# Patient Record
Sex: FEMALE | Race: BLACK OR AFRICAN AMERICAN | NOT HISPANIC OR LATINO | Employment: FULL TIME | URBAN - METROPOLITAN AREA
[De-identification: names, ages, dates, MRNs, and addresses within clinical notes are randomized per-mention and may not be internally consistent; named-entity substitution may affect disease eponyms.]

---

## 2017-03-30 ENCOUNTER — HOSPITAL ENCOUNTER (EMERGENCY)
Facility: CLINIC | Age: 32
Discharge: HOME | End: 2017-03-30
Attending: EMERGENCY MEDICINE | Admitting: EMERGENCY MEDICINE
Payer: COMMERCIAL

## 2017-03-30 VITALS
DIASTOLIC BLOOD PRESSURE: 87 MMHG | RESPIRATION RATE: 18 BRPM | TEMPERATURE: 97.6 F | BODY MASS INDEX: 32.46 KG/M2 | HEART RATE: 75 BPM | WEIGHT: 174.6 LBS | OXYGEN SATURATION: 98 % | SYSTOLIC BLOOD PRESSURE: 130 MMHG

## 2017-03-30 DIAGNOSIS — M54.16 LUMBAR RADICULOPATHY: ICD-10-CM

## 2017-03-30 PROCEDURE — 25000132 ZZH RX MED GY IP 250 OP 250 PS 637: Performed by: EMERGENCY MEDICINE

## 2017-03-30 PROCEDURE — 99284 EMERGENCY DEPT VISIT MOD MDM: CPT | Mod: Z6 | Performed by: EMERGENCY MEDICINE

## 2017-03-30 PROCEDURE — 99283 EMERGENCY DEPT VISIT LOW MDM: CPT | Performed by: EMERGENCY MEDICINE

## 2017-03-30 RX ORDER — IBUPROFEN 600 MG/1
600 TABLET, FILM COATED ORAL EVERY 6 HOURS PRN
Qty: 30 TABLET | Refills: 1 | Status: SHIPPED | OUTPATIENT
Start: 2017-03-30 | End: 2017-06-29

## 2017-03-30 RX ORDER — IBUPROFEN 600 MG/1
600 TABLET, FILM COATED ORAL ONCE
Status: COMPLETED | OUTPATIENT
Start: 2017-03-30 | End: 2017-03-30

## 2017-03-30 RX ORDER — OXYCODONE HYDROCHLORIDE 5 MG/1
5 TABLET ORAL ONCE
Status: COMPLETED | OUTPATIENT
Start: 2017-03-30 | End: 2017-03-30

## 2017-03-30 RX ORDER — METHYLPREDNISOLONE 4 MG
TABLET, DOSE PACK ORAL
Qty: 21 TABLET | Refills: 0 | Status: SHIPPED | OUTPATIENT
Start: 2017-03-30 | End: 2017-06-13

## 2017-03-30 RX ORDER — OXYCODONE HYDROCHLORIDE 5 MG/1
5 TABLET ORAL EVERY 6 HOURS PRN
Qty: 20 TABLET | Refills: 0 | Status: SHIPPED | OUTPATIENT
Start: 2017-03-30 | End: 2017-06-13

## 2017-03-30 RX ADMIN — OXYCODONE HYDROCHLORIDE 5 MG: 5 TABLET ORAL at 04:20

## 2017-03-30 RX ADMIN — IBUPROFEN 600 MG: 600 TABLET ORAL at 04:20

## 2017-03-30 ASSESSMENT — ENCOUNTER SYMPTOMS
FEVER: 0
ABDOMINAL PAIN: 0
SHORTNESS OF BREATH: 0
BACK PAIN: 1

## 2017-03-30 NOTE — ED AVS SNAPSHOT
[facility], Emergency Department    [address]    [place] [address]    Phone:  [phone]    Fax:  [phone]                                       Tonia Smith   MRN: [id number]    Department:  [facility], Emergency Department   Date of Visit:  3/30/2017           Patient Information     Date Of Birth          1985        Your diagnoses for this visit were:     Lumbar radiculopathy        You were seen by Jarek Mota MD.        Discharge Instructions       Please make an appointment to follow up with Your Primary Care Provider in 5-7 days         Discharge References/Attachments     BACK PAIN W/ SCIATICA (ENGLISH)      24 Hour Appointment Hotline       To make an appointment at any [place] clinic, call [phone] ([phone]). If you don't have a family doctor or clinic, we will help you find one. [place] clinics are conveniently located to serve the needs of you and your family.             Review of your medicines      START taking        Dose / Directions Last dose taken    methylPREDNISolone 4 MG tablet   Commonly known as:  MEDROL DOSEPAK   Quantity:  21 tablet        Follow package instructions   Refills:  0        oxyCODONE 5 MG IR tablet   Commonly known as:  ROXICODONE   Dose:  5 mg   Quantity:  20 tablet        Take 1 tablet (5 mg) by mouth every 6 hours as needed for pain   Refills:  0          CONTINUE these medicines which may have CHANGED, or have new prescriptions. If we are uncertain of the size of tablets/capsules you have at home, strength may be listed as something that might have changed.        Dose / Directions Last dose taken    ibuprofen 600 MG tablet   Commonly known as:  ADVIL/MOTRIN   Dose:  600 mg   What changed:    - medication strength  - how much to take   Quantity:  30 tablet        Take 1 tablet (600 mg) by mouth every 6 hours as needed for moderate pain   Refills:  1          Our records show that you are taking the medicines listed  below. If these are incorrect, please call your family doctor or clinic.        Dose / Directions Last dose taken    acetaminophen 500 MG tablet   Commonly known as:  TYLENOL   Dose:  500-1000 mg   Quantity:  100 tablet        Take 1-2 tablets (500-1,000 mg) by mouth every 6 hours as needed for mild pain   Refills:  2        ferrous sulfate 325 (65 FE) MG tablet   Commonly known as:  IRON   Dose:  325 mg   Quantity:  30 tablet        Take 1 tablet (325 mg) by mouth daily (with breakfast)   Refills:  2        glycerin-hypromellose- 0.2-0.2-1 % Soln ophthalmic solution   Commonly known as:  ARTIFICIAL TEARS   Dose:  1 drop   Quantity:  1 Bottle        Place 1 drop Into the left eye 4 times daily as needed for dry eyes   Refills:  3        labetalol 100 MG tablet   Commonly known as:  NORMODYNE   Dose:  100 mg   Quantity:  60 tablet        Take 1 tablet (100 mg) by mouth 2 times daily   Refills:  3        levonorgestrel 20 MCG/24HR IUD   Commonly known as:  MIRENA   Dose:  1 each        1 each (20 mcg) by Intrauterine route continuous   Refills:  0        meclizine 25 MG tablet   Commonly known as:  ANTIVERT   Dose:  25 mg   Quantity:  30 tablet        Take 1 tablet (25 mg) by mouth every 6 hours as needed for dizziness   Refills:  1        polyethylene glycol powder   Commonly known as:  MIRALAX   Dose:  1 capful   Quantity:  510 g        Take 17 g (1 capful) by mouth daily   Refills:  3        prenatal multivitamin  plus iron 27-0.8 MG Tabs per tablet   Dose:  1 tablet   Quantity:  100 tablet        Take 1 tablet by mouth daily   Refills:  3        senna-docusate 8.6-50 MG per tablet   Commonly known as:  SENOKOT-S;PERICOLACE   Dose:  1-2 tablet   Quantity:  40 tablet        Take 1-2 tablets by mouth 2 times daily   Refills:  0                Prescriptions were sent or printed at these locations (3 Prescriptions)                   Other Prescriptions                Printed at Department/Unit printer (3 of 3)  "        methylPREDNISolone (MEDROL DOSEPAK) 4 MG tablet               ibuprofen (ADVIL/MOTRIN) 600 MG tablet               oxyCODONE (ROXICODONE) 5 MG IR tablet                Orders Needing Specimen Collection     None      Pending Results     No orders found from 3/28/2017 to 3/31/2017.            Pending Culture Results     No orders found from 3/28/2017 to 3/31/2017.            Thank you for choosing [facility]       Thank you for choosing [facility] for your care. Our goal is always to provide you with excellent care. Hearing back from our patients is one way we can continue to improve our services. Please take a few minutes to complete the written survey that you may receive in the mail after you visit with us. Thank you!        [employer] Information     [email or web address] lets you send messages to your doctor, view your test results, renew your prescriptions, schedule appointments and more. To sign up, go to [email or web address] . Click on \"Log in\" on the left side of the screen, which will take you to the Welcome page. Then click on \"Sign up Now\" on the right side of the page.     You will be asked to enter the access code listed below, as well as some personal information. Please follow the directions to create your username and password.     Your access code is: [email or web address]  Expires: 2017  5:10 AM     Your access code will  in 90 days. If you need help or a new code, please call your [facility] clinic or [phone].        Care EveryWhere ID     This is your Care EveryWhere ID. This could be used by other organizations to access your [facility] medical records  [id number]        After Visit Summary       This is your record. Keep this with you and show to your community pharmacist(s) and doctor(s) at your next visit.                  "

## 2017-03-30 NOTE — ED AVS SNAPSHOT
[facility], [place], Emergency Department    [address]    [facility] [address]    Phone:  [phone]    Fax:  [phone]                                       Tonia Smith   MRN: [id number]    Department:  [facility], Emergency Department   Date of Visit:  3/30/2017           After Visit Summary Signature Page     I have received my discharge instructions, and my questions have been answered. I have discussed any challenges I see with this plan with the nurse or doctor.    ..........................................................................................................................................  Patient/Patient Representative Signature      ..........................................................................................................................................  Patient Representative Print Name and Relationship to Patient    ..................................................               ................................................  Date                                            Time    ..........................................................................................................................................  Reviewed by Signature/Title    ...................................................              ..............................................  Date                                                            Time

## 2017-03-30 NOTE — ED PROVIDER NOTES
History     Chief Complaint   Patient presents with     Back Pain     sharp low back pain x 3 days; radiates to bilateral feet     HPI  Tonia Smith is a 32 year old female who presents with back pain.  This started 3 days ago.  Denies any injury.  Pain is in her lower back and radiates down into her left leg.  Worse with movement.  No focal weakness.  He is ago but nothing since.  Has had low back pain in the past.  No history of surgeries.  No major trauma.    I have reviewed the Medications, Allergies, Past Medical and Surgical History, and Social History in the [facility] system.  Past Medical History:   Diagnosis Date     Hypertension      Preeclampsia        Past Surgical History:   Procedure Laterality Date      SECTION  10/5/2013    Procedure:  SECTION;;  Surgeon: Noemy Fowler MD;  Location: [facility] L[facility]      SECTION N/A 2016    Procedure:  SECTION;  Surgeon: Leah Patiño MD;  Location: [facility]     GYN SURGERY         Family History   Problem Relation Age of Onset     Family History Negative         Social History   Substance Use Topics     Smoking status: Never Smoker     Smokeless tobacco: Never Used     Alcohol use No      Review of Systems   Constitutional: Negative for fever.   Respiratory: Negative for shortness of breath.    Cardiovascular: Negative for chest pain.   Gastrointestinal: Negative for abdominal pain.   Musculoskeletal: Positive for back pain.   All other systems reviewed and are negative.      Physical Exam   BP: (!) 127/97  Pulse: 75  Heart Rate: 79  Temp: 98  F (36.7  C)  Resp: 16  Weight: 79.2 kg (174 lb 9.6 oz)  SpO2: 99 %  Physical Exam   Vital Signs and Nursing Notes Reviewed.  General:  NAD  HEENT: Oropharynx clear.  No obvious signs of trauma.  PERRL.  EOMI  Neck: Supple.  No lymphadenopathy.  Cardiac: RRR.  No murmurs, rubs or gallops  Lungs: Clear bilaterally.  Normal respiratory rate.    Abdomen:  Soft, Nontender, no rebound  or guarding.  Back: No CVA tenderness.  Skin:  No rash.  No diaphoresis  Extremities:  No cyanosis, clubbing, or edema.  Neurological:  CN II-XII intact, Strength intact, Sensation intact, No pronator drift, Normal gait.  Pulse:  Symmetric in bilateral upper and lower extremities.     ED Course     ED Course     Procedures             Critical Care time:  none               Labs Ordered and Resulted from Time of ED Arrival Up to the Time of Departure from the ED - No data to display    Assessments & Plan (with Medical Decision Making)  32 year old who presents with low back pain.  Seems likely lumbar radiculopathy.  Normal neurologic exam.  No indication for imaging.  Patient given oxycodone and ibuprofen with improvement of her symptoms.  We'll discharge her home with a Medrol Dosepak in addition to the above medications.  She will follow-up with her primary.  She will return for worsening symptoms.         I have reviewed the nursing notes.    I have reviewed the findings, diagnosis, plan and need for follow up with the patient.    Discharge Medication List as of 3/30/2017  5:10 AM      START taking these medications    Details   methylPREDNISolone (MEDROL DOSEPAK) 4 MG tablet Follow package instructions, Disp-21 tablet, R-0, Local Print      oxyCODONE (ROXICODONE) 5 MG IR tablet Take 1 tablet (5 mg) by mouth every 6 hours as needed for pain, Disp-20 tablet, R-0, Local Print             Final diagnoses:   Lumbar radiculopathy       3/30/2017   [facility], [place], EMERGENCY DEPARTMENT     Jarek Mota MD  03/30/17 0536

## 2017-04-05 ENCOUNTER — OFFICE VISIT (OUTPATIENT)
Dept: FAMILY MEDICINE | Facility: CLINIC | Age: 32
End: 2017-04-05

## 2017-04-05 VITALS
BODY MASS INDEX: 31.6 KG/M2 | RESPIRATION RATE: 20 BRPM | OXYGEN SATURATION: 98 % | DIASTOLIC BLOOD PRESSURE: 89 MMHG | HEART RATE: 86 BPM | SYSTOLIC BLOOD PRESSURE: 127 MMHG | WEIGHT: 170 LBS | TEMPERATURE: 97.9 F

## 2017-04-05 DIAGNOSIS — Z30.011 ENCOUNTER FOR INITIAL PRESCRIPTION OF CONTRACEPTIVE PILLS: ICD-10-CM

## 2017-04-05 DIAGNOSIS — Z30.432 ENCOUNTER FOR IUD REMOVAL: ICD-10-CM

## 2017-04-05 DIAGNOSIS — M54.16 ACUTE LUMBAR RADICULOPATHY: Primary | ICD-10-CM

## 2017-04-05 RX ORDER — NORETHINDRONE ACETATE 5 MG
5 TABLET ORAL DAILY
Qty: 84 TABLET | Refills: 3 | Status: SHIPPED | OUTPATIENT
Start: 2017-04-05 | End: 2018-03-09

## 2017-04-05 NOTE — MR AVS SNAPSHOT
After Visit Summary   4/5/2017    Tonia Smith    MRN: [id number]           Patient Information     Date Of Birth          1985        Visit Information        Provider Department      4/5/2017 10:40 AM Wanda Guerrero MD [facility]        Today's Diagnoses     Acute lumbar radiculopathy    -  1      Care Instructions    Here is the plan from today's visit    1. Acute lumbar radiculopathy  -Continue solumedrol, Oxycodone and ibuprofen prn for pain-control   - [facility] REFERRAL  -Follow up in one month if not improving or worsening       Please call or return to clinic if your symptoms don't go away.    Follow up plan  Follow up in one month     Thank you for coming to [facility] today.  Lab Testing:  **If you had lab testing today and your results are reassuring or normal they will be mailed to you or sent through [employer] within 7 days.   **If the lab tests need quick action we will call you with the results.  The phone number we will call with results is # [phone] (home) . If this is not the best number please call our clinic and change the number.  Medication Refills:  If you need any refills please call your pharmacy and they will contact us.   If you need to  your refill at a new pharmacy, please contact the new pharmacy directly. The new pharmacy will help you get your medications transferred faster.   Scheduling:  If you have any concerns about today's visit or wish to schedule another appointment please call our office during normal business hours [phone] (8-5:00 M-F)  If a referral was made to a [facility] and you don't get a call from central scheduling please call [phone].  If a Mammogram was ordered for you at [facility] call [phone] to schedule or change your appointment.  If you had an XRay/CT/Ultrasound/MRI ordered the number is [phone] to schedule or change your radiology appointment.    Medical Concerns:  If you have urgent medical concerns please call [phone] at any time of the day.  If you have a medical emergency please call [phone].        Follow-ups after your visit        Additional Services     [facility]       *This therapy referral will be filtered to a centralized scheduling office at [facility] and the patient will receive a call to schedule an appointment at a [facility] location most convenient for them. *     [facility] provides Physical Therapy evaluation and treatment and many specialty services across the [facility] system.  If requesting a specialty program, please choose from the list below.    If you have not heard from the scheduling office within 2 business days, please call [phone] for all locations, with the exception of [place], please call [phone].  Treatment: Evaluation & Treatment  Special Instructions/Modalities:   Special Programs: None    Please be aware that coverage of these services is subject to the terms and limitations of your health insurance plan.  Call member services at your health plan with any benefit or coverage questions.      **Note to Provider:  If you are referring outside of [facility] for the therapy appointment, please list the name of the location in the  special instructions  above, print the referral and give to the patient to schedule the appointment.                  Who to contact     Please call your clinic at [phone] to:    Ask questions about your health    Make or cancel appointments    Discuss your medicines    Learn about your test results    Speak to your doctor   If you have compliments or concerns about an experience at your clinic, or if you wish to file a complaint, please contact [facility] Patient Relations at [phone] or email us at [email or web address]         Additional Information About Your Visit        Taryn  Information     Cm[employer]enty is an electronic gateway that provides easy, online access to your medical records. With [employer]mxtwenty, you can request a clinic appointment, read your test results, renew a prescription or communicate with your care team.     To sign up for [employer] visit the website at [email or web address]   You will be asked to enter the access code listed below, as well as some personal information. Please follow the directions to create your username and password.     Your access code is: [email or web address]  Expires: 2017  5:10 AM     Your access code will  in 90 days. If you need help or a new code, please contact your [facility] or call [phone] for assistance.        Care EveryWhere ID     This is your Care EveryWhere ID. This could be used by other organizations to access your [facility] medical records  [id number]        Your Vitals Were     Pulse Temperature Respirations Pulse Oximetry Breastfeeding? BMI (Body Mass Index)    86 97.9  F (36.6  C) (Oral) 20 98% No 31.6 kg/m2       Blood Pressure from Last 3 Encounters:   17 127/89   17 130/87   16 122/85    Weight from Last 3 Encounters:   17 170 lb (77.1 kg)   17 174 lb 9.6 oz (79.2 kg)   16 171 lb 9.6 oz (77.8 kg)              We Performed the Following     [facility]        Primary Care Provider Office Phone # Fax #    Wanda Guerrero [phone]       [facility] [address] [facility] [address]        Thank you!     Thank you for choosing [facility]  for your care. Our goal is always to provide you with excellent care. Hearing back from our patients is one way we can continue to improve our services. Please take a few minutes to complete the written survey that you may receive in the mail after your visit with us. Thank you!             Your Updated Medication List - Protect others around you: Learn how to  safely use, store and throw away your medicines at www.disposemymeds.org.          This list is accurate as of: 4/5/17 11:30 AM.  Always use your most recent med list.                   Brand Name Dispense Instructions for use    acetaminophen 500 MG tablet    TYLENOL    100 tablet    Take 1-2 tablets (500-1,000 mg) by mouth every 6 hours as needed for mild pain       ferrous sulfate 325 (65 FE) MG tablet    IRON    30 tablet    Take 1 tablet (325 mg) by mouth daily (with breakfast)       glycerin-hypromellose- 0.2-0.2-1 % Soln ophthalmic solution    ARTIFICIAL TEARS    1 Bottle    Place 1 drop Into the left eye 4 times daily as needed for dry eyes       ibuprofen 600 MG tablet    ADVIL/MOTRIN    30 tablet    Take 1 tablet (600 mg) by mouth every 6 hours as needed for moderate pain       labetalol 100 MG tablet    NORMODYNE    60 tablet    Take 1 tablet (100 mg) by mouth 2 times daily       levonorgestrel 20 MCG/24HR IUD    MIRENA     1 each (20 mcg) by Intrauterine route continuous       meclizine 25 MG tablet    ANTIVERT    30 tablet    Take 1 tablet (25 mg) by mouth every 6 hours as needed for dizziness       methylPREDNISolone 4 MG tablet    MEDROL DOSEPAK    21 tablet    Follow package instructions       oxyCODONE 5 MG IR tablet    ROXICODONE    20 tablet    Take 1 tablet (5 mg) by mouth every 6 hours as needed for pain       polyethylene glycol powder    MIRALAX    510 g    Take 17 g (1 capful) by mouth daily       prenatal multivitamin  plus iron 27-0.8 MG Tabs per tablet     100 tablet    Take 1 tablet by mouth daily       senna-docusate 8.6-50 MG per tablet    SENOKOT-S;PERICOLACE    40 tablet    Take 1-2 tablets by mouth 2 times daily

## 2017-04-05 NOTE — PATIENT INSTRUCTIONS
Here is the plan from today's visit    1. Acute lumbar radiculopathy  -Continue solumedrol, Oxycodone and ibuprofen prn for pain-control   - [facility] RE[facility]ERR[facility]  -Follow up in one month if not improving or worsening       Please call or return to clinic if your symptoms don't go away.    Follow up plan  Follow up in one month     Thank you for coming to [facility] today.  Lab Testing:  **If you had lab testing today and your results are reassuring or normal they will be mailed to you or sent through [employer] within 7 days.   **If the lab tests need quick action we will call you with the results.  The phone number we will call with results is # [phone] (home) . If this is not the best number please call our clinic and change the number.  Medication Refills:  If you need any refills please call your pharmacy and they will contact us.   If you need to  your refill at a new pharmacy, please contact the new pharmacy directly. The new pharmacy will help you get your medications transferred faster.   Scheduling:  If you have any concerns about today's visit or wish to schedule another appointment please call our office during normal business hours [phone] (8-5:00 M-F)  If a referral was made to a [facility] and you don't get a call from central scheduling please call [phone].  If a Mammogram was ordered for you at [facility] call [phone] to schedule or change your appointment.  If you had an XRay/CT/Ultrasound/MRI ordered the number is [phone] to schedule or change your radiology appointment.   Medical Concerns:  If you have urgent medical concerns please call [phone] at any time of the day.  If you have a medical emergency please call [phone].

## 2017-04-05 NOTE — PROGRESS NOTES
HPI:       oTnia Smith is a 32 year old who presents for the following  Patient presents with:  IUD: had IUD inserted on 10/11/16, would liek it removed due to it causing back pain   Back Pain: low back pain for 2 weeks     Back pain: Report she has been dealing with acute back pain for the past two weeks.Describe the pain as sharp pain in the lower back and radiates into the left leg. Denies any bowel or bladder incontinences. She feels back pain associated with IUD. The Mirena was placed on Oct 11, 2016.     Problem, Medication and Allergy Lists were reviewed and are current.  Patient is an established patient of this clinic.         Review of Systems:   Review of Systems   Constitutional: Negative for fatigue and fever.   Respiratory: Negative for cough and shortness of breath.    Cardiovascular: Negative for chest pain, palpitations and leg swelling.   Musculoskeletal: Positive for back pain.        Leg pain             Physical Exam:   Patient Vitals for the past 24 hrs:   BP Temp Temp src Pulse Resp SpO2 Weight   04/05/17 1055 127/89 97.9  F (36.6  C) Oral 86 20 98 % 170 lb (77.1 kg)     Body mass index is 31.6 kg/(m^2).  Vitals were reviewed and were normal     Physical Exam   Constitutional: She is oriented to person, place, and time. She appears well-developed and well-nourished. She appears distressed.   HENT:   Head: Normocephalic and atraumatic.   Cardiovascular: Normal rate, regular rhythm and normal heart sounds.    No murmur heard.  Pulmonary/Chest: Effort normal and breath sounds normal. No respiratory distress. She has no wheezes. She has no rales.   Genitourinary:       Musculoskeletal:   LUMBAR SPINE:  Inspection: No diischarge, erythema and Lumbar prominence asymmetry  Tender:  lumbar spinous processes, left para lumbar muscles, right para lumbar muscles  Non-tender: left SI joint, right SI joint  Range of Motion: decrease range of motion due to pain   Strength:  unable to heel  walk  Special tests:  negative straight leg raises       Neurological: She is alert and oriented to person, place, and time.         Results:   SUBJECTIVE: Tonia Smith is a 32 year old  female, requests IUD removal     Verification of Procedure:  Just before the procedure begans through verbal and active participation of team members, I verified:     Initials   Patient Name HM   Patient  1985   Procedure to be performed NA     Consent:  Risks, benefits of treatment, and alternative options for contraception were discussed.  Patient's questions were elicited and answered.  Written consent was obtained and scanned into medical record.       OBJECTIVE: /89 (BP Location: Left arm, Patient Position: Chair, Cuff Size: Adult Regular)  Pulse 86  Temp 97.9  F (36.6  C) (Oral)  Resp 20  Wt 170 lb (77.1 kg)  SpO2 98%  Breastfeeding? No  BMI 31.6 kg/m2    Pelvic Exam:  EG/BUS: Normal genital architecture without lesions, erythema or abnormal secretions. Bartholin's, Urethra, Milbridge's glands are normal.   Vagina: moist, pink, rugae with creamy, white and odorlesssecretions  Cervix: Multiparous, and IUD strings extend 3 cm from external os.  Uterus: anteverted,    Adnexa: Within normal limits and No masses, nodularity, tenderness  Rectum: anus normal      PROCEDURE NOTE - Mirena IUD removal    Reason for removal: complication of back pain     Cervix was visualized with a medium Graves speculum and prepped with. The strings were grasped with a uterine packing forceps and the IUD removed with gentle traction.  No resistance was encountered.  The Mirena IUD immediately regained resting shape and was without odor or discoloration.  There were no complications.  Tonia Smith reported no pain.  There was no bleeding.     EBL:  Minimal     Complications:  None      Tonia Smith tolerated procedure well.    PLAN:      Written information on IUD use reviewed and given.  Symptoms to report reviewed. To  report heavy bleeding, severe cramping, or abnormal vaginal discharge.  May take Ibuprofen 400-800 mg PO TID PRN or Naproxen 500 mg PO BID for cramping. Patient has been counseled to use backup birth control method for 1 week.   Tonia Smith  verbalized understanding of instructions.  Assessment and Plan     Tonia was seen today for iud and back pain.    Diagnoses and all orders for this visit:    Acute lumbar radiculopathy:  -Patient education and reassurance provided.  -Continue solumedrol, Oxycodone and ibuprofen prn for pain-control   - [facility]  -Follow up in one month if not improving or worsening   -     [facility]    Encounter for IUD removal: s/p IUD removal     Encounter for initial prescription of contraceptive pills  _Will start progestin only pill for contraceptive for now since she is currently breastfeeding her 4 month old infant  -     norethindrone (AYGESTIN) 5 MG tablet; Take 1 tablet (5 mg) by mouth daily      There are no discontinued medications.  Options for treatment and follow-up care were reviewed with the patient. Tonia Smith  engaged in the decision making process and verbalized understanding of the options discussed and agreed with the final plan.    Wanda Guerrero MD  PGY 3 [facility] Medicine  [phone](pg)

## 2017-04-05 NOTE — PROGRESS NOTES
Preceptor Attestation:   Patient seen and discussed with the resident. Assessment and plan reviewed with resident and agreed upon.   Supervising Physician:  [facility]

## 2017-04-13 ASSESSMENT — ENCOUNTER SYMPTOMS
COUGH: 0
SHORTNESS OF BREATH: 0
FEVER: 0
BACK PAIN: 1
PALPITATIONS: 0
FATIGUE: 0

## 2017-04-19 ENCOUNTER — THERAPY VISIT (OUTPATIENT)
Dept: PHYSICAL THERAPY | Facility: CLINIC | Age: 32
End: 2017-04-19
Payer: COMMERCIAL

## 2017-04-19 DIAGNOSIS — M54.50 LUMBAGO: Primary | ICD-10-CM

## 2017-04-19 PROCEDURE — 97112 NEUROMUSCULAR REEDUCATION: CPT | Mod: GP | Performed by: PHYSICAL THERAPIST

## 2017-04-19 PROCEDURE — 97161 PT EVAL LOW COMPLEX 20 MIN: CPT | Mod: GP | Performed by: PHYSICAL THERAPIST

## 2017-04-19 PROCEDURE — T1013 SIGN LANG/ORAL INTERPRETER: HCPCS | Mod: U3

## 2017-04-19 NOTE — PROGRESS NOTES
[facility]  System  Physical Exam  General   ROS        Physical Therapy Initial Examination/Evaluation April 19, 2017   Tonia Smith is a 32 year old female referred to physical therapy by Dr. Wanda Guerrero for treatment of acute lumbar radiculopathy with Precautions/Restrictions/MD instructions E&T   Therapist Assessment:   Clinical Impression: Pt presents to [facility] with primary complaint of low back pain which has become constant in nature.  Per clinical examination, pt with left lateral shift in standing and limited lumbar ROM due to pain and hesitancy with movement.  Evaluation ongoing as pt arriving late to appointment and needing extra time to fill out paperwork.  Pt with positive slump test on the R.   Pt will benefit from skilled physical therapy to address functional deficits associated with worsening back pain.       Subjective: Pt reports that back pain has been present for ~1 month. There was no incident of injury. Initially pain was on both sides, now pain is mostly on the R.   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 Job duties: prolonged standing, repetitive tasks, lifting/carrying   Current HEP/exercise regimen: none   Patient's goals are improve walking mechanics, decreasing pain    Other pertinent PMH: none noted General health as reported by patient: Fair   Return to MD: prn      Lumbar Spine Evaluation  Static Posture  Foot: Pes planus/cavus:  "planus bilat     Lumbar Spine:   Posterior pelvic tilt  Pt leaning to the L    Dynamic Movement Screen  Single leg stance observations: hesitant to complete  Double limb squat observations: not tested  Gait:L lateral lean       Trunk Range of Motion  Flexion: 30% ROM with pt leaning to the L   Extension: minimal movement  Side bendin% ROM to the L; improved symptoms; 25% ROM to the R, increased pain   Rotation: 50% bilat; \"poking feeling when rotating to the R  Hamstring: hypertonic bilat      Hip and Knee Strength   Not tested this date due to time constraints      Special Tests  Neural tension: Reproduction of pain bilat with slump test; worse on the R    Assessment/Plan:      Patient is a 32 year old female with lumbar complaints.    Patient has the following significant findings with corresponding treatment plan.                Diagnosis 1:  Acute Lumbar Radiculopathy  Pain -  hot/cold therapy, manual therapy, education, directional preference exercise and home program  Decreased ROM/flexibility - manual therapy, therapeutic exercise and home program  Decreased strength - therapeutic exercise, therapeutic activities and home program  Impaired gait - gait training and home program  Impaired muscle performance - neuro re-education and home program  Impaired posture - neuro re-education, therapeutic activities and home program    Therapy Evaluation Codes:   1) History comprised of:   Personal factors that impact the plan of care:      Living environment and Past/current experiences.    Comorbidity factors that impact the plan of care are:      Cancer, Pain at night/rest and Weakness.     Medications impacting care: None.  2) Examination of Body Systems comprised of:   Body structures and functions that impact the plan of care:      Hip and Lumbar spine.   Activity limitations that impact the plan of care are:      Bathing, Dressing, Lifting, Sitting, Standing, Walking and Sleeping.  3) Clinical presentation " characteristics are:   Evolving/Changing.  4) Decision-Making    Low complexity using standardized patient assessment instrument and/or measureable assessment of functional outcome.  Cumulative Therapy Evaluation is: Low complexity.    Previous and current functional limitations:  (See Goal Flow Sheet for this information)    Short term and Long term goals: (See Goal Flow Sheet for this information)     Communication ability:  Patient appears to be able to clearly communicate and understand verbal and written communication and follow directions correctly.  Treatment Explanation - The following has been discussed with the patient:   RX ordered/plan of care  Anticipated outcomes  Possible risks and side effects  This patient would benefit from PT intervention to resume normal activities.   Rehab potential is good.    Frequency:  1 X week, once daily  Duration:  for 8 weeks  Discharge Plan:  Achieve all LTG.  Independent in home treatment program.  Reach maximal therapeutic benefit.    Please refer to the daily flowsheet for treatment today, total treatment time and time spent performing 1:1 timed codes.

## 2017-04-19 NOTE — MR AVS SNAPSHOT
After Visit Summary   4/19/2017    Tonia Smith    MRN: [id number]           Patient Information     Date Of Birth          1985        Visit Information        Provider Department      4/19/2017 12:10 PM Viola Garnett; Corry Manzanares, PT [facility] Physical [facility] [place]        Today's Diagnoses     Lumbago    -  1       Follow-ups after your visit        Your next 10 appointments already scheduled     Apr 24, 2017 11:15 AM CDT   FABIAN Spine with Corry Manzanares PT, [facility] [place] ([facility])    [address] [place] [id number] [phone]            May 03, 2017 11:00 AM CDT   FABIAN Spine with Corry Manzanares PT   [facility] [place] ( [facility])    [address] [place] [id number] [phone]            May 10, 2017 11:00 AM CDT   FABIAN Spine with Corry Manzanares PT   [facility] [place] ( [facility])    [address] [place] [id number] [phone]            May 17, 2017 11:00 AM CDT   FABIAN Spine with Corry Manzanares PT   [facility] [place] ( [facility])    [address] [place] [id number] [phone]              Who to contact     If you have questions or need follow up information about today's clinic visit or your schedule please contact [facility] directly at [phone].  Normal or non-critical lab and imaging results will be communicated to you by MyChart, letter or phone within 4 business days after the clinic has received the results. If you do not hear from us within 7 days, please contact the clinic through MyChart or phone. If you have a critical or abnormal lab result, we will notify you by phone as soon as  "possible.  Submit refill requests through [employer] or call your pharmacy and they will forward the refill request to us. Please allow 3 business days for your refill to be completed.          Additional Information About Your Visit        Roseonly Information     [employer]only lets you send messages to your doctor, view your test results, renew your prescriptions, schedule appointments and more. To sign up, go to [email or web address] . Click on \"Log in\" on the left side of the screen, which will take you to the Welcome page. Then click on \"Sign up Now\" on the right side of the page.     You will be asked to enter the access code listed below, as well as some personal information. Please follow the directions to create your username and password.     Your access code is: [email or web address]  Expires: 2017  5:10 AM     Your access code will  in 90 days. If you need help or a new code, please call your [facility] clinic or [phone].        Care EveryWhere ID     This is your Care EveryWhere ID. This could be used by other organizations to access your [facility] medical records  [id number]         Blood Pressure from Last 3 Encounters:   17 127/89   17 130/87   16 122/85    Weight from Last 3 Encounters:   17 77.1 kg (170 lb)   17 79.2 kg (174 lb 9.6 oz)   16 77.8 kg (171 lb 9.6 oz)              We Performed the Following     HC PT EVAL, LOW COMPLEXITY     FABIAN INITIAL EVAL REPORT     NEUROMUSCULAR RE-EDUCATION        Primary Care Provider Office Phone # Fax #    Wanda Guerrero [phone]       [facility] [address] [facility] [address]        Thank you!     Thank you for choosing [facility] [place]  for your care. Our goal is always to provide you with excellent care. Hearing back from our patients is one way we can continue to improve our services. Please take a few minutes to complete the written survey that you may " receive in the mail after your visit with us. Thank you!             Your Updated Medication List - Protect others around you: Learn how to safely use, store and throw away your medicines at www.disposemymeds.org.          This list is accurate as of: 4/19/17 11:59 PM.  Always use your most recent med list.                   Brand Name Dispense Instructions for use    acetaminophen 500 MG tablet    TYLENOL    100 tablet    Take 1-2 tablets (500-1,000 mg) by mouth every 6 hours as needed for mild pain       ferrous sulfate 325 (65 FE) MG tablet    IRON    30 tablet    Take 1 tablet (325 mg) by mouth daily (with breakfast)       glycerin-hypromellose- 0.2-0.2-1 % Soln ophthalmic solution    ARTIFICIAL TEARS    1 Bottle    Place 1 drop Into the left eye 4 times daily as needed for dry eyes       ibuprofen 600 MG tablet    ADVIL/MOTRIN    30 tablet    Take 1 tablet (600 mg) by mouth every 6 hours as needed for moderate pain       labetalol 100 MG tablet    NORMODYNE    60 tablet    Take 1 tablet (100 mg) by mouth 2 times daily       levonorgestrel 20 MCG/24HR IUD    MIRENA     1 each (20 mcg) by Intrauterine route continuous       meclizine 25 MG tablet    ANTIVERT    30 tablet    Take 1 tablet (25 mg) by mouth every 6 hours as needed for dizziness       methylPREDNISolone 4 MG tablet    MEDROL DOSEPAK    21 tablet    Follow package instructions       norethindrone 5 MG tablet    AYGESTIN    84 tablet    Take 1 tablet (5 mg) by mouth daily       oxyCODONE 5 MG IR tablet    ROXICODONE    20 tablet    Take 1 tablet (5 mg) by mouth every 6 hours as needed for pain       polyethylene glycol powder    MIRALAX    510 g    Take 17 g (1 capful) by mouth daily       prenatal multivitamin  plus iron 27-0.8 MG Tabs per tablet     100 tablet    Take 1 tablet by mouth daily       senna-docusate 8.6-50 MG per tablet    SENOKOT-S;PERICOLACE    40 tablet    Take 1-2 tablets by mouth 2 times daily

## 2017-04-20 PROBLEM — M54.50 LUMBAGO: Status: ACTIVE | Noted: 2017-04-20

## 2017-04-24 ENCOUNTER — THERAPY VISIT (OUTPATIENT)
Dept: PHYSICAL THERAPY | Facility: CLINIC | Age: 32
End: 2017-04-24
Payer: COMMERCIAL

## 2017-04-24 DIAGNOSIS — M54.50 LUMBAGO: ICD-10-CM

## 2017-04-24 PROCEDURE — 97112 NEUROMUSCULAR REEDUCATION: CPT | Mod: GP | Performed by: PHYSICAL THERAPIST

## 2017-04-24 PROCEDURE — 97140 MANUAL THERAPY 1/> REGIONS: CPT | Mod: GP | Performed by: PHYSICAL THERAPIST

## 2017-04-24 NOTE — PROGRESS NOTES
4/24/2017 MMT Left Right   Hip Flexion 2/5 limited by pain  2/5 limited by pain    Hip Abduction 2/5 pain  2+/5   Hip Extension  NT NT

## 2017-04-24 NOTE — MR AVS SNAPSHOT
After Visit Summary   4/24/2017    Tonia Smith    MRN: [id number]           Patient Information     Date Of Birth          1985        Visit Information        Provider Department      4/24/2017 11:15 AM Corry Manzanares PT; [facility]        Today's Diagnoses     Lumbago           Follow-ups after your visit        Your next 10 appointments already scheduled     May 03, 2017 11:00 AM CDT   FABIAN Spine with Corry aMnzanares PT   [facility] [place] ([facility])    [address] [place] [address] [phone]            May 10, 2017 11:00 AM CDT   FABIAN Spine with Corry Manzanares PT   [facility] [place] ([facility])    [address] [place] [phone]            May 17, 2017 11:00 AM CDT   FABIAN Spine with Corry Manzanares PT   [facility] ( [facility])    [address] [place] [phone]              Who to contact     If you have questions or need follow up information about today's clinic visit or your schedule please contact [facility] directly at [phone].  Normal or non-critical lab and imaging results will be communicated to you by MyChart, letter or phone within 4 business days after the clinic has received the results. If you do not hear from us within 7 days, please contact the clinic through MyChart or phone. If you have a critical or abnormal lab result, we will notify you by phone as soon as possible.  Submit refill requests through [employer] or call your pharmacy and they will forward the refill request to us. Please allow 3 business days for your refill to be completed.          Additional Information About Your Visit        MyChart Information      "[employer] lets you send messages to your doctor, view your test results, renew your prescriptions, schedule appointments and more. To sign up, go to [email or web address] . Click on \"Log in\" on the left side of the screen, which will take you to the Welcome page. Then click on \"Sign up Now\" on the right side of the page.     You will be asked to enter the access code listed below, as well as some personal information. Please follow the directions to create your username and password.     Your access code is: [email or web address]  Expires: 2017  5:10 AM     Your access code will  in 90 days. If you need help or a new code, please call your [place] clinic or [phone].        Care EveryWhere ID     This is your Care EveryWhere ID. This could be used by other organizations to access your [place] medical records  [id number]         Blood Pressure from Last 3 Encounters:   17 127/89   17 130/87   16 122/85    Weight from Last 3 Encounters:   17 77.1 kg (170 lb)   17 79.2 kg (174 lb 9.6 oz)   16 77.8 kg (171 lb 9.6 oz)              We Performed the Following     MANUAL THER TECH,1+REGIONS,EA 15 MIN     NEUROMUSCULAR RE-EDUCATION        Primary Care Provider Office Phone # Fax #    Wanda Guerrero [phone]       [facility] [address] [place] [id number]        Thank you!     Thank you for choosing [facility] [place]  for your care. Our goal is always to provide you with excellent care. Hearing back from our patients is one way we can continue to improve our services. Please take a few minutes to complete the written survey that you may receive in the mail after your visit with us. Thank you!             Your Updated Medication List - Protect others around you: Learn how to safely use, store and throw away your medicines at www.disposemymeds.org.          This list is accurate as of: 17 12:32 PM.  Always use " your most recent med list.                   Brand Name Dispense Instructions for use    acetaminophen 500 MG tablet    TYLENOL    100 tablet    Take 1-2 tablets (500-1,000 mg) by mouth every 6 hours as needed for mild pain       ferrous sulfate 325 (65 FE) MG tablet    IRON    30 tablet    Take 1 tablet (325 mg) by mouth daily (with breakfast)       glycerin-hypromellose- 0.2-0.2-1 % Soln ophthalmic solution    ARTIFICIAL TEARS    1 Bottle    Place 1 drop Into the left eye 4 times daily as needed for dry eyes       ibuprofen 600 MG tablet    ADVIL/MOTRIN    30 tablet    Take 1 tablet (600 mg) by mouth every 6 hours as needed for moderate pain       labetalol 100 MG tablet    NORMODYNE    60 tablet    Take 1 tablet (100 mg) by mouth 2 times daily       levonorgestrel 20 MCG/24HR IUD    MIRENA     1 each (20 mcg) by Intrauterine route continuous       meclizine 25 MG tablet    ANTIVERT    30 tablet    Take 1 tablet (25 mg) by mouth every 6 hours as needed for dizziness       methylPREDNISolone 4 MG tablet    MEDROL DOSEPAK    21 tablet    Follow package instructions       norethindrone 5 MG tablet    AYGESTIN    84 tablet    Take 1 tablet (5 mg) by mouth daily       oxyCODONE 5 MG IR tablet    ROXICODONE    20 tablet    Take 1 tablet (5 mg) by mouth every 6 hours as needed for pain       polyethylene glycol powder    MIRALAX    510 g    Take 17 g (1 capful) by mouth daily       prenatal multivitamin  plus iron 27-0.8 MG Tabs per tablet     100 tablet    Take 1 tablet by mouth daily       senna-docusate 8.6-50 MG per tablet    SENOKOT-S;PERICOLACE    40 tablet    Take 1-2 tablets by mouth 2 times daily

## 2017-05-11 ENCOUNTER — THERAPY VISIT (OUTPATIENT)
Dept: PHYSICAL THERAPY | Facility: CLINIC | Age: 32
End: 2017-05-11
Payer: COMMERCIAL

## 2017-05-11 DIAGNOSIS — M54.50 LUMBAGO: ICD-10-CM

## 2017-05-11 PROCEDURE — 97140 MANUAL THERAPY 1/> REGIONS: CPT | Mod: GP | Performed by: PHYSICAL THERAPIST

## 2017-05-11 PROCEDURE — 97112 NEUROMUSCULAR REEDUCATION: CPT | Mod: GP | Performed by: PHYSICAL THERAPIST

## 2017-05-11 PROCEDURE — 97110 THERAPEUTIC EXERCISES: CPT | Mod: GP | Performed by: PHYSICAL THERAPIST

## 2017-05-17 ENCOUNTER — THERAPY VISIT (OUTPATIENT)
Dept: PHYSICAL THERAPY | Facility: CLINIC | Age: 32
End: 2017-05-17
Payer: COMMERCIAL

## 2017-05-17 DIAGNOSIS — M54.50 LUMBAGO: ICD-10-CM

## 2017-05-17 PROCEDURE — 97110 THERAPEUTIC EXERCISES: CPT | Mod: GP | Performed by: PHYSICAL THERAPIST

## 2017-05-17 PROCEDURE — 97530 THERAPEUTIC ACTIVITIES: CPT | Mod: GP | Performed by: PHYSICAL THERAPIST

## 2017-05-17 PROCEDURE — 97112 NEUROMUSCULAR REEDUCATION: CPT | Mod: GP | Performed by: PHYSICAL THERAPIST

## 2017-06-12 ENCOUNTER — OFFICE VISIT (OUTPATIENT)
Dept: FAMILY MEDICINE | Facility: CLINIC | Age: 32
End: 2017-06-12

## 2017-06-12 VITALS
TEMPERATURE: 98.3 F | HEART RATE: 83 BPM | OXYGEN SATURATION: 96 % | RESPIRATION RATE: 16 BRPM | SYSTOLIC BLOOD PRESSURE: 125 MMHG | BODY MASS INDEX: 32.2 KG/M2 | WEIGHT: 173.2 LBS | DIASTOLIC BLOOD PRESSURE: 86 MMHG

## 2017-06-12 DIAGNOSIS — H69.93 DYSFUNCTION OF EUSTACHIAN TUBE, BILATERAL: ICD-10-CM

## 2017-06-12 DIAGNOSIS — H81.10 BPPV (BENIGN PAROXYSMAL POSITIONAL VERTIGO), UNSPECIFIED LATERALITY: Primary | ICD-10-CM

## 2017-06-12 NOTE — MR AVS SNAPSHOT
After Visit Summary   2017    Tonia Smith    MRN: [id number]           Patient Information     Date Of Birth          1985        Visit Information        Provider Department      2017 1:00 PM Fara Celaya APRN CNP [facility]        Today's Diagnoses     BPPV (benign paroxysmal positional vertigo), unspecified laterality    -  1    Dysfunction of eustachian tube, bilateral           Follow-ups after your visit        Who to contact     Please call your clinic at [phone] to:    Ask questions about your health    Make or cancel appointments    Discuss your medicines    Learn about your test results    Speak to your doctor   If you have compliments or concerns about an experience at your clinic, or if you wish to file a complaint, please contact [facility] Patient Relations at [phone] or email us at [email or web address]         Additional Information About Your Visit        MyChart Information     Volancet is an electronic gateway that provides easy, online access to your medical records. With Supersolid, you can request a clinic appointment, read your test results, renew a prescription or communicate with your care team.     To sign up for Volancet visit the website at [email or web address]   You will be asked to enter the access code listed below, as well as some personal information. Please follow the directions to create your username and password.     Your access code is: [email or web address]  Expires: 2017  5:10 AM     Your access code will  in 90 days. If you need help or a new code, please contact your [facility] or call [phone] for assistance.        Care EveryWhere ID     This is your Care EveryWhere ID. This could be used by other organizations to access your [facility] medical records  [id number]        Your Vitals Were     Pulse Temperature Respirations Pulse  Oximetry BMI (Body Mass Index)       83 98.3  F (36.8  C) (Oral) 16 96% 32.2 kg/m2        Blood Pressure from Last 3 Encounters:   06/12/17 125/86   04/05/17 127/89   03/30/17 130/87    Weight from Last 3 Encounters:   06/12/17 173 lb 3.2 oz (78.6 kg)   04/05/17 170 lb (77.1 kg)   03/30/17 174 lb 9.6 oz (79.2 kg)              Today, you had the following     No orders found for display         Today's Medication Changes          These changes are accurate as of: 6/12/17 11:59 PM.  If you have any questions, ask your nurse or doctor.               Start taking these medicines.        Dose/Directions    loratadine 10 MG tablet   Commonly known as:  CLARITIN   Used for:  Dysfunction of eustachian tube, bilateral   Started by:  Fara Celaya APRN CNP        Dose:  10 mg   Take 1 tablet (10 mg) by mouth daily   Quantity:  30 tablet   Refills:  6         These medicines have changed or have updated prescriptions.        Dose/Directions    meclizine 25 MG tablet   Commonly known as:  ANTIVERT   This may have changed:  when to take this   Used for:  BPPV (benign paroxysmal positional vertigo), unspecified laterality   Changed by:  Fara Celaya APRN CNP        Dose:  25 mg   Take 1 tablet (25 mg) by mouth 3 times daily as needed for dizziness   Quantity:  20 tablet   Refills:  0         Stop taking these medicines if you haven't already. Please contact your care team if you have questions.     methylPREDNISolone 4 MG tablet   Commonly known as:  MEDROL DOSEPAK   Stopped by:  Fara Celaya APRN CNP           oxyCODONE 5 MG IR tablet   Commonly known as:  ROXICODONE   Stopped by:  Fara Celaya APRN CNP                Where to get your medicines      Some of these will need a paper prescription and others can be bought over the counter.  Ask your nurse if you have questions.     You don't need a prescription for these medications     loratadine 10 MG tablet    meclizine 25 MG tablet                 Primary Care Provider Office Phone # Fax #    Wanda Guerrero [phone]       [facility] [address] [facility] [address]        Thank you!     Thank you for choosing [facility]  for your care. Our goal is always to provide you with excellent care. Hearing back from our patients is one way we can continue to improve our services. Please take a few minutes to complete the written survey that you may receive in the mail after your visit with us. Thank you!             Your Updated Medication List - Protect others around you: Learn how to safely use, store and throw away your medicines at www.disposemymeds.org.          This list is accurate as of: 6/12/17 11:59 PM.  Always use your most recent med list.                   Brand Name Dispense Instructions for use    acetaminophen 500 MG tablet    TYLENOL    100 tablet    Take 1-2 tablets (500-1,000 mg) by mouth every 6 hours as needed for mild pain       ferrous sulfate 325 (65 FE) MG tablet    IRON    30 tablet    Take 1 tablet (325 mg) by mouth daily (with breakfast)       glycerin-hypromellose- 0.2-0.2-1 % Soln ophthalmic solution    ARTIFICIAL TEARS    1 Bottle    Place 1 drop Into the left eye 4 times daily as needed for dry eyes       ibuprofen 600 MG tablet    ADVIL/MOTRIN    30 tablet    Take 1 tablet (600 mg) by mouth every 6 hours as needed for moderate pain       labetalol 100 MG tablet    NORMODYNE    60 tablet    Take 1 tablet (100 mg) by mouth 2 times daily       levonorgestrel 20 MCG/24HR IUD    MIRENA     1 each (20 mcg) by Intrauterine route continuous       loratadine 10 MG tablet    CLARITIN    30 tablet    Take 1 tablet (10 mg) by mouth daily       meclizine 25 MG tablet    ANTIVERT    20 tablet    Take 1 tablet (25 mg) by mouth 3 times daily as needed for dizziness       norethindrone 5 MG tablet    AYGESTIN    84 tablet    Take 1 tablet (5 mg) by mouth daily       polyethylene glycol  powder    MIRALAX    510 g    Take 17 g (1 capful) by mouth daily       prenatal multivitamin  plus iron 27-0.8 MG Tabs per tablet     100 tablet    Take 1 tablet by mouth daily       senna-docusate 8.6-50 MG per tablet    SENOKOT-S;PERICOLACE    40 tablet    Take 1-2 tablets by mouth 2 times daily

## 2017-06-13 LAB — HCG UR QL: NEGATIVE

## 2017-06-13 RX ORDER — LORATADINE 10 MG/1
10 TABLET ORAL DAILY
Qty: 30 TABLET | Refills: 6
Start: 2017-06-13 | End: 2018-03-02

## 2017-06-13 RX ORDER — MECLIZINE HYDROCHLORIDE 25 MG/1
25 TABLET ORAL 3 TIMES DAILY PRN
Qty: 20 TABLET | Refills: 0
Start: 2017-06-13 | End: 2018-03-02

## 2017-06-13 ASSESSMENT — ENCOUNTER SYMPTOMS
HEADACHES: 1
CARDIOVASCULAR NEGATIVE: 1
SORE THROAT: 0
RHINORRHEA: 0
FATIGUE: 1
CHILLS: 0
DIZZINESS: 1
COUGH: 1
FEVER: 0
SHORTNESS OF BREATH: 0
GASTROINTESTINAL NEGATIVE: 1
WHEEZING: 0

## 2017-06-13 NOTE — PROGRESS NOTES
HPI:       Tonia Smith is a 32 year old who presents for the following  Patient presents with:  Dizziness: having the spins last night but has felt like this for 9 months since giving birth back in November  Cough: dry cough x's 2 weeks    1.  Patient reports intermittent dizziness since having last baby 9 months ago.  Previously she was having short episodes (seconds) approximately 3 times per week. Had a more severe episode last evening where her room was spinning and this lasted through the night.  She reports that she had to hold her head and eyes due to being so uncomfortable.  No dizziness currently.  Does have a mild headache and heaviness feeling in her head.  Associated with symptoms of fatigue/weakness.  Denies shortness of breath, palpitations or diaphoresis.    Was previously seen in August 2016 and was given meclizine.  Patient states this medication was slightly helpful, but does not have any more of this at home.      She has not been sleeping well.  She states that she goes to sleep around 10-11 p.m. And is awake throughout much of the night. She states that she doesn't typically fall asleep until early morning.      LMP:  Unknown    Denies excessive worry/anxiety.      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  Her  works nights.      2.  +Ear congestion and non-productive cough (which is primarily present at night).  No known history of seasonal allergies.    Patient denies nasal congestion/sneezing.      An [employer]  was used for  this visit.      Problem, Medication and Allergy Lists were reviewed and are current.  Patient is an established patient of this clinic.         Review of Systems:   Review of Systems   Constitutional: Positive for fatigue. Negative for chills and fever.   HENT: Positive for congestion (bilateral ear congestion). Negative for ear discharge, ear pain, rhinorrhea, sneezing and sore throat.    Respiratory: Positive for cough. Negative for shortness of  breath and wheezing.    Cardiovascular: Negative.    Gastrointestinal: Negative.    Neurological: Positive for dizziness and headaches.             Physical Exam:   Patient Vitals for the past 24 hrs:   BP Temp Temp src Pulse Resp SpO2 Weight   06/12/17 1536 125/86 98.3  F (36.8  C) Oral 83 16 96 % 173 lb 3.2 oz (78.6 kg)     Body mass index is 32.2 kg/(m^2).  Vitals were reviewed and were normal     Physical Exam   Constitutional: She appears well-nourished. No distress.   HENT:   Right Ear: External ear and ear canal normal.   Left Ear: External ear and ear canal normal.   Mouth/Throat: Oropharynx is clear and moist.   Bilateral TM's with slight haziness, minimal fluid bubbles seen.     Eyes: Pupils are equal, round, and reactive to light.   Cardiovascular: Normal rate and regular rhythm.    Pulmonary/Chest: Effort normal and breath sounds normal. No respiratory distress. She has no wheezes.   Neurological: She is alert. She has normal strength. No cranial nerve deficit or sensory deficit.       Assessment and Plan       Tonia was seen today for dizziness and cough.    Diagnoses and all orders for this visit:    BPPV (benign paroxysmal positional vertigo), unspecified laterality  -     meclizine (ANTIVERT) 25 MG tablet; Take 1 tablet (25 mg) by mouth 3 times daily as needed for dizziness  -     UPT:  Negative.    Unable to review EPIC, recent labs as EPIC was down during patient's visit.   Encouraged patient to follow-up in 1 week for recheck, sooner as needed.    Consider checking TSH, CBC at next visit.      Dysfunction of eustachian tube, bilateral  -     loratadine (CLARITIN) 10 MG tablet; Take 1 tablet (10 mg) by mouth daily      Follow-up in 1 week, sooner with any worsening.      Options for treatment and follow-up care were reviewed with the patient. Tonia Smith  engaged in the decision making process and verbalized understanding of the options discussed and agreed with the final plan.    Fara Schilling  LYNN Celaya CNP

## 2017-06-28 ENCOUNTER — TELEPHONE (OUTPATIENT)
Dept: FAMILY MEDICINE | Facility: CLINIC | Age: 32
End: 2017-06-28

## 2017-06-28 NOTE — TELEPHONE ENCOUNTER
"Patient has no showed 2 scheduled appointments. Please use following script to explain no show policy to patient:    \"We see that you have missed some of your recently scheduled appointments. At [facility] we have a new no show policy, where if you miss too many appointments within 1 year, we may not be able to continue to schedule you. If you are unable to make your scheduled appointments, please call the clinic to cancel prior to your appointment time.\"  "

## 2017-06-29 ENCOUNTER — OFFICE VISIT (OUTPATIENT)
Dept: FAMILY MEDICINE | Facility: CLINIC | Age: 32
End: 2017-06-29

## 2017-06-29 VITALS
HEART RATE: 89 BPM | TEMPERATURE: 98.1 F | WEIGHT: 174 LBS | RESPIRATION RATE: 16 BRPM | OXYGEN SATURATION: 95 % | SYSTOLIC BLOOD PRESSURE: 116 MMHG | DIASTOLIC BLOOD PRESSURE: 83 MMHG | BODY MASS INDEX: 32.34 KG/M2

## 2017-06-29 DIAGNOSIS — H60.593 OTHER NONINFECTIVE ACUTE OTITIS EXTERNA OF BOTH EARS: ICD-10-CM

## 2017-06-29 DIAGNOSIS — G44.219 EPISODIC TENSION-TYPE HEADACHE, NOT INTRACTABLE: Primary | ICD-10-CM

## 2017-06-29 RX ORDER — ACETAMINOPHEN 500 MG
500-1000 TABLET ORAL EVERY 6 HOURS PRN
Qty: 100 TABLET | Refills: 2 | Status: SHIPPED | OUTPATIENT
Start: 2017-06-29 | End: 2018-05-02

## 2017-06-29 RX ORDER — IBUPROFEN 600 MG/1
600 TABLET, FILM COATED ORAL EVERY 6 HOURS PRN
Qty: 30 TABLET | Refills: 1 | Status: SHIPPED | OUTPATIENT
Start: 2017-06-29 | End: 2018-03-02

## 2017-06-29 RX ORDER — NEOMYCIN SULFATE, POLYMYXIN B SULFATE AND HYDROCORTISONE 10; 3.5; 1 MG/ML; MG/ML; [USP'U]/ML
4 SUSPENSION/ DROPS AURICULAR (OTIC) 4 TIMES DAILY
Qty: 6 ML | Refills: 0 | Status: SHIPPED | OUTPATIENT
Start: 2017-06-29 | End: 2017-07-06

## 2017-06-29 NOTE — MR AVS SNAPSHOT
"              After Visit Summary   6/29/2017    Tonia Smith    MRN: [id number]           Patient Information     Date Of Birth          1985        Visit Information        Provider Department      6/29/2017 2:20 PM Wanda Guerrero MD [facility]        Today's Diagnoses     Episodic tension-type headache, not intractable    -  1    Other noninfective acute otitis externa of both ears          Care Instructions      * Tension Headache    Muscle Tension Headache (also called \"stress headache\") is a very common cause of head pain. Under stress, some people tense the muscles of their shoulder, neck and scalp without knowing it. If this lasts long enough, a headache can occur. These headaches can be very painful and last for hours or even days.  Home Care:    If you were given pain medicine for this headache, do not drive yourself home. Arrange for a ride, instead. When you get home, try to sleep. You should feel much better when you wake up.    Heat to the back of your neck may relieve neck spasm.    Drink only clear liquids or eat a very light diet to avoid nausea/vomiting until symptoms improve.  Preventing Future Headaches    Identify the sources of stress in your life. These may not be obvious! Learn new ways to handle your stress, such as regular exercise, biofeedback, self-hypnosis and meditation. For more information about this, consult your doctor or go to a local bookstore and review the many books and tapes on this subject.    At the first sign of a tension headache, take time out if possible. Remove yourself from the stressful situation, find a quiet comfortable place to sit or lie down and let yourself relax. Heat and deep massage of the tight areas in the neck and shoulders may help reduce muscle spasm. Medicine, such as ibuprofen (Advil or Motrin) or a prescribed muscle relaxant may be helpful at this point.  Follow Up with your doctor if the headache is not better within the " next 24 hours. If you have frequent headaches you should discuss a treatment plan with your primary care doctor. Ask if you can have medicine to take at home the next time you get a bad headache. This may avoid the need for a visit to the emergency department in the future. Poorly controlled chronic headaches may require a referral to a neurologist (headache specialist).  Call your Doctor Or Get Prompt Medical Attention if any of the following occur:    Worsening of your head pain or no improvement within 24 hours    Repeated vomiting (unable to keep liquids down)    Fever of 100.4 F (38.0 C) or higher, or as directed by your healthcare provider    Stiff neck    Extreme drowsiness, confusion or fainting    Dizziness, vertigo (dizziness with spinning sensation)    Weakness of an arm or leg or one side of the face    Difficulty with speech or vision    1985-5389 [facility], [address] [place] [address]. All rights reserved. This information is not intended as a substitute for professional medical care. Always follow your healthcare professional's instructions.      Here is the plan from today's visit    1. Episodic tension-type headache, not intractable  - acetaminophen (TYLENOL) 500 MG tablet; Take 1-2 tablets (500-1,000 mg) by mouth every 6 hours as needed for mild pain  Dispense: 100 tablet; Refill: 2  - ibuprofen (ADVIL/MOTRIN) 600 MG tablet; Take 1 tablet (600 mg) by mouth every 6 hours as needed for moderate pain  Dispense: 30 tablet; Refill: 1    2. Other noninfective acute otitis externa of both ears  - neomycin-polymyxin-hydrocortisone (CORTISPORIN) 3.5-96220-7 otic suspension; Place 4 drops in ear(s) 4 times daily for 7 days  Dispense: 6 mL; Refill: 0    Please call or return to clinic if your symptoms don't go away.    Follow up plan  Follow up as needed    Thank you for coming to [facility] today.  Lab Testing:  **If you had lab testing today and your results are reassuring or normal  they will be mailed to you or sent through [employer] within 7 days.   **If the lab tests need quick action we will call you with the results.  The phone number we will call with results is # [phone] (home) . If this is not the best number please call our clinic and change the number.  Medication Refills:  If you need any refills please call your pharmacy and they will contact us.   If you need to  your refill at a new pharmacy, please contact the new pharmacy directly. The new pharmacy will help you get your medications transferred faster.   Scheduling:  If you have any concerns about today's visit or wish to schedule another appointment please call our office during normal business hours [phone] (8-5:00 M-F)  If a referral was made to a [facility] and you don't get a call from central scheduling please call [phone].  If a Mammogram was ordered for you at [facility] call [phone] to schedule or change your appointment.  If you had an XRay/CT/Ultrasound/MRI ordered the number is [phone] to schedule or change your radiology appointment.   Medical Concerns:  If you have urgent medical concerns please call [phone] at any time of the day.  If you have a medical emergency please call 251.          Follow-ups after your visit        Who to contact     Please call your clinic at [phone] to:    Ask questions about your health    Make or cancel appointments    Discuss your medicines    Learn about your test results    Speak to your doctor   If you have compliments or concerns about an experience at your clinic, or if you wish to file a complaint, please contact [facility] Patient Relations at [phone] or email us at [email or web address]         Additional Information About Your Visit        [employer] Information     [employer] is an electronic gateway that provides easy, online access to your medical records. With  [employer], you can request a clinic appointment, read your test results, renew a prescription or communicate with your care team.     To sign up for [employer] visit the website at [email or web address]   You will be asked to enter the access code listed below, as well as some personal information. Please follow the directions to create your username and password.     Your access code is: [email or web address]  Expires: 2017  2:52 PM     Your access code will  in 90 days. If you need help or a new code, please contact your [facility] or call [phone] for assistance.        Care EveryWhere ID     This is your Care EveryWhere ID. This could be used by other organizations to access your [facility] medical records  [id number]        Your Vitals Were     Pulse Temperature Respirations Pulse Oximetry BMI (Body Mass Index)       89 98.1  F (36.7  C) (Oral) 16 95% 32.34 kg/m2        Blood Pressure from Last 3 Encounters:   17 116/83   17 125/86   17 127/89    Weight from Last 3 Encounters:   17 174 lb (78.9 kg)   17 173 lb 3.2 oz (78.6 kg)   17 170 lb (77.1 kg)              Today, you had the following     No orders found for display         Today's Medication Changes          These changes are accurate as of: 17  2:52 PM.  If you have any questions, ask your nurse or doctor.               Start taking these medicines.        Dose/Directions    neomycin-polymyxin-hydrocortisone 3.5-58098-0 otic suspension   Commonly known as:  CORTISPORIN   Used for:  Other noninfective acute otitis externa of both ears   Started by:  Wanda Guerrero MD        Dose:  4 drop   Place 4 drops in ear(s) 4 times daily for 7 days   Quantity:  6 mL   Refills:  0            Where to get your medicines      These medications were sent to [facility] [place], [place] - [address] , [facility] [address]     Phone:  [phone]      acetaminophen 500 MG tablet    ibuprofen 600 MG tablet    neomycin-polymyxin-hydrocortisone 3.5-89614-4 otic suspension                Primary Care Provider Office Phone # Fax #    Wanda Guerrero [phone]       [facility] [address] [facility] [address]        Equal Access to Services     ESPERANZA PEÑA : Hadii aad ku hadasho Soomaali, waaxda luqadaha, qaybta kaalmada adelennieyada, britney matos husseinjeremiah samloveheath clifford. So [facility] [phone].    ATENCIÓN: Si habla español, tiene a gordillo disposición servicios gratuitos de asistencia lingüística. [facility] [phone].    We comply with applicable federal civil rights laws and [place] laws. We do not discriminate on the basis of race, color, national origin, age, disability sex, sexual orientation or gender identity.            Thank you!     Thank you for choosing [facility]  for your care. Our goal is always to provide you with excellent care. Hearing back from our patients is one way we can continue to improve our services. Please take a few minutes to complete the written survey that you may receive in the mail after your visit with us. Thank you!             Your Updated Medication List - Protect others around you: Learn how to safely use, store and throw away your medicines at www.disposemymeds.org.          This list is accurate as of: 6/29/17  2:52 PM.  Always use your most recent med list.                   Brand Name Dispense Instructions for use Diagnosis    acetaminophen 500 MG tablet    TYLENOL    100 tablet    Take 1-2 tablets (500-1,000 mg) by mouth every 6 hours as needed for mild pain    Episodic tension-type headache, not intractable       ibuprofen 600 MG tablet    ADVIL/MOTRIN    30 tablet    Take 1 tablet (600 mg) by mouth every 6 hours as needed for moderate pain    Episodic tension-type headache, not intractable       loratadine 10 MG tablet    CLARITIN    30 tablet    Take 1 tablet (10 mg) by  mouth daily    Dysfunction of eustachian tube, bilateral       meclizine 25 MG tablet    ANTIVERT    20 tablet    Take 1 tablet (25 mg) by mouth 3 times daily as needed for dizziness    BPPV (benign paroxysmal positional vertigo), unspecified laterality       neomycin-polymyxin-hydrocortisone 3.5-29278-6 otic suspension    CORTISPORIN    6 mL    Place 4 drops in ear(s) 4 times daily for 7 days    Other noninfective acute otitis externa of both ears       norethindrone 5 MG tablet    AYGESTIN    84 tablet    Take 1 tablet (5 mg) by mouth daily    Encounter for initial prescription of contraceptive pills

## 2017-06-29 NOTE — PATIENT INSTRUCTIONS
"  * Tension Headache    Muscle Tension Headache (also called \"stress headache\") is a very common cause of head pain. Under stress, some people tense the muscles of their shoulder, neck and scalp without knowing it. If this lasts long enough, a headache can occur. These headaches can be very painful and last for hours or even days.  Home Care:    If you were given pain medicine for this headache, do not drive yourself home. Arrange for a ride, instead. When you get home, try to sleep. You should feel much better when you wake up.    Heat to the back of your neck may relieve neck spasm.    Drink only clear liquids or eat a very light diet to avoid nausea/vomiting until symptoms improve.  Preventing Future Headaches    Identify the sources of stress in your life. These may not be obvious! Learn new ways to handle your stress, such as regular exercise, biofeedback, self-hypnosis and meditation. For more information about this, consult your doctor or go to a local bookstore and review the many books and tapes on this subject.    At the first sign of a tension headache, take time out if possible. Remove yourself from the stressful situation, find a quiet comfortable place to sit or lie down and let yourself relax. Heat and deep massage of the tight areas in the neck and shoulders may help reduce muscle spasm. Medicine, such as ibuprofen (Advil or Motrin) or a prescribed muscle relaxant may be helpful at this point.  Follow Up with your doctor if the headache is not better within the next 24 hours. If you have frequent headaches you should discuss a treatment plan with your primary care doctor. Ask if you can have medicine to take at home the next time you get a bad headache. This may avoid the need for a visit to the emergency department in the future. Poorly controlled chronic headaches may require a referral to a neurologist (headache specialist).  Call your Doctor Or Get Prompt Medical Attention if any of the following " occur:    Worsening of your head pain or no improvement within 24 hours    Repeated vomiting (unable to keep liquids down)    Fever of 100.4 F (38.0 C) or higher, or as directed by your healthcare provider    Stiff neck    Extreme drowsiness, confusion or fainting    Dizziness, vertigo (dizziness with spinning sensation)    Weakness of an arm or leg or one side of the face    Difficulty with speech or vision    4855-8869 [employer] [address], [place] [address]. All rights reserved. This information is not intended as a substitute for professional medical care. Always follow your healthcare professional's instructions.      Here is the plan from today's visit    1. Episodic tension-type headache, not intractable  - acetaminophen (TYLENOL) 500 MG tablet; Take 1-2 tablets (500-1,000 mg) by mouth every 6 hours as needed for mild pain  Dispense: 100 tablet; Refill: 2  - ibuprofen (ADVIL/MOTRIN) 600 MG tablet; Take 1 tablet (600 mg) by mouth every 6 hours as needed for moderate pain  Dispense: 30 tablet; Refill: 1    2. Other noninfective acute otitis externa of both ears  - neomycin-polymyxin-hydrocortisone (CORTISPORIN) 3.5-33029-4 otic suspension; Place 4 drops in ear(s) 4 times daily for 7 days  Dispense: 6 mL; Refill: 0    Please call or return to clinic if your symptoms don't go away.    Follow up plan  Follow up as needed    Thank you for coming to [facility] today.  Lab Testing:  **If you had lab testing today and your results are reassuring or normal they will be mailed to you or sent through Pentaho within 7 days.   **If the lab tests need quick action we will call you with the results.  The phone number we will call with results is # [phone] (home) . If this is not the best number please call our clinic and change the number.  Medication Refills:  If you need any refills please call your pharmacy and they will contact us.   If you need to  your refill at a new pharmacy,  please contact the new pharmacy directly. The new pharmacy will help you get your medications transferred faster.   Scheduling:  If you have any concerns about today's visit or wish to schedule another appointment please call our office during normal business hours [phone] (8-5:00 M-F)  If a referral was made to a [facility] and you don't get a call from central scheduling please call [phone].  If a Mammogram was ordered for you at [facility] call [phone] to schedule or change your appointment.  If you had an XRay/CT/Ultrasound/MRI ordered the number is [phone] to schedule or change your radiology appointment.   Medical Concerns:  If you have urgent medical concerns please call [phone] at any time of the day.  If you have a medical emergency please call 522.

## 2017-06-29 NOTE — NURSING NOTE
name: Yuli Libradoid  Language: Oromo  Agency: [facility]  Phone number: [phone]  Lisy Carbone

## 2017-06-29 NOTE — PROGRESS NOTES
Preceptor Attestation:  I discussed the patient with the resident. Assessment and plan reviewed with resident and agreed upon.   Supervising Physician:  Casey Guerra MD  [facility]

## 2017-06-29 NOTE — PROGRESS NOTES
HPI:       Tonia Smith is a 32 year old who presents for the following  Patient presents with:  RECHECK: f/u on bilateral ear pain, has improved so has the dizziness. Itchiness in both ears occosionally w/ neck itchiness at night  Headache: started yesterday and worse in the a.m. Frontal headache    Headache     Onset: yesterday     Description:   Location: bilateral in the frontal area   Character: squeezing pain  Frequency:  Daily  Duration:  hours    Intensity: 7/10    Progression of Symptoms:  worsening    Accompanying Signs & Symptoms:  Stiff neck: no  Neck or upper back pain: no  Fever: no  Sinus pressure: no  Nausea or vomitingno  Dizziness: Yes Details: improving   Numbness::no  Weakness (in one part of the body?  :no  Visual changes: no   History:   Head trauma: no  Family history of migraines: no  Previous tests for headaches: no  Neurologist evaluations: no  Able to do daily activities: Yes Details: she is able to take care her kids  Wake with a headaches: Yes Details: sometimes  Do headaches wake you up:no  Daily pain medication use:  no  New life stressors  :no    Precipitating factors:   Does light make it worse: Yes Details: sometimes  Does sound make it worse: no    Alleviating factors:  Does sleep help: YES         Therapies Tried and outcome: None       Problem, Medication and Allergy Lists were reviewed and are current.  Patient is an established patient of this clinic.         Review of Systems:   Review of Systems   Constitutional: Negative for fatigue and fever.   HENT: Positive for ear pain. Negative for ear discharge and sinus pressure.    Respiratory: Negative for cough and shortness of breath.    Cardiovascular: Negative for chest pain, palpitations and leg swelling.   Musculoskeletal: Negative for neck pain and neck stiffness.   Neurological: Positive for dizziness and headaches. Negative for speech difficulty and light-headedness.   Psychiatric/Behavioral: Negative for dysphoric  mood and sleep disturbance.             Physical Exam:   Patient Vitals for the past 24 hrs:   BP Temp Temp src Pulse Resp SpO2 Weight   06/29/17 1428 116/83 98.1  F (36.7  C) Oral 89 16 95 % 174 lb (78.9 kg)     Body mass index is 32.34 kg/(m^2).  Vitals were reviewed and were normal     Physical Exam   Constitutional: She is oriented to person, place, and time. She appears well-developed and well-nourished. No distress.   HENT:   Head: Normocephalic and atraumatic.   Right Ear: Hearing and tympanic membrane normal. There is swelling and tenderness.   Left Ear: Hearing and ear canal normal. There is swelling and tenderness.   Neck: Normal range of motion and full passive range of motion without pain. Neck supple. No spinous process tenderness and no muscular tenderness present. No rigidity. No Brudzinski's sign and no Kernig's sign noted.   Cardiovascular: Normal rate, regular rhythm and normal heart sounds.    No murmur heard.  Pulmonary/Chest: Effort normal and breath sounds normal. No respiratory distress. She has no wheezes. She has no rales.   Musculoskeletal: Normal range of motion. She exhibits no edema.   Neurological: She is alert and oriented to person, place, and time.       Results:      Results from the last 24 hoursNo results found for this or any previous visit (from the past 24 hour(s)).  Assessment and Plan     Tonia was seen today for recheck and headache.    Diagnoses and all orders for this visit:    Episodic tension-type headache, not intractable:  No red flags. Benign neuro exam. Headache relieved by sleeping and tylenol.   -Patient education and reassurance provided.   -Encouraged to do stress reduction exercise, heat and deep oil massage of the tight areas in the neck and shoulder.   -Consider amitriptyline if no improvement with tylenol or ibuprofen.   -     acetaminophen (TYLENOL) 500 MG tablet; Take 1-2 tablets (500-1,000 mg) by mouth every 6 hours as needed for mild pain  -     ibuprofen  (ADVIL/MOTRIN) 600 MG tablet; Take 1 tablet (600 mg) by mouth every 6 hours as needed for moderate pain    Other noninfective acute otitis externa of both ears  -     neomycin-polymyxin-hydrocortisone (CORTISPORIN) 3.5-28212-1 otic suspension; Place 4 drops in ear(s) 4 times daily for 7 days      Medications Discontinued During This Encounter   Medication Reason     labetalol (NORMODYNE) 100 MG tablet Stopped by Patient     polyethylene glycol (MIRALAX) powder Stopped by Patient     senna-docusate (SENOKOT-S;PERICOLACE) 8.6-50 MG per tablet Stopped by Patient     Prenatal Vit-Fe Fumarate-FA (PRENATAL MULTIVITAMIN  PLUS IRON) 27-0.8 MG TABS Stopped by Patient     levonorgestrel (MIRENA) 20 MCG/24HR IUD Stopped by Patient     glycerin-hypromellose- (ARTIFICIAL TEARS) 0.2-0.2-1 % SOLN ophthalmic solution Stopped by Patient     ferrous sulfate (IRON) 325 (65 FE) MG tablet Stopped by Patient     acetaminophen (TYLENOL) 500 MG tablet Reorder     ibuprofen (ADVIL/MOTRIN) 600 MG tablet Reorder     Options for treatment and follow-up care were reviewed with the patient. Tonia Smith  engaged in the decision making process and verbalized understanding of the options discussed and agreed with the final plan.    Wanda Guerrero MD  [facility]  [phone](pg)

## 2017-06-30 ASSESSMENT — ENCOUNTER SYMPTOMS
FATIGUE: 0
SLEEP DISTURBANCE: 0
COUGH: 0
PALPITATIONS: 0
HEADACHES: 1
NECK PAIN: 0
DIZZINESS: 1
FEVER: 0
SHORTNESS OF BREATH: 0
DYSPHORIC MOOD: 0
NECK STIFFNESS: 0
SINUS PRESSURE: 0
SPEECH DIFFICULTY: 0
LIGHT-HEADEDNESS: 0

## 2017-06-30 ASSESSMENT — PATIENT HEALTH QUESTIONNAIRE - PHQ9: SUM OF ALL RESPONSES TO PHQ QUESTIONS 1-9: 2

## 2017-10-11 ENCOUNTER — OFFICE VISIT (OUTPATIENT)
Dept: FAMILY MEDICINE | Facility: CLINIC | Age: 32
End: 2017-10-11

## 2017-10-11 VITALS
TEMPERATURE: 98.4 F | BODY MASS INDEX: 31.23 KG/M2 | HEART RATE: 94 BPM | WEIGHT: 168 LBS | RESPIRATION RATE: 18 BRPM | DIASTOLIC BLOOD PRESSURE: 88 MMHG | OXYGEN SATURATION: 99 % | SYSTOLIC BLOOD PRESSURE: 138 MMHG

## 2017-10-11 DIAGNOSIS — Z00.00 ROUTINE GENERAL MEDICAL EXAMINATION AT A HEALTH CARE FACILITY: Primary | ICD-10-CM

## 2017-10-11 NOTE — PROGRESS NOTES
HPI:       Tonia Smith is a 32 year old who presents for the following  Patient presents with:  Forms: citizenship forms    She is presenting for a doctors letter for department of homeland security. She failed her previous citizenship test and is now undergoing waiver for test. Form was completed by her psychologist and now is presenting for a letter stating her PCP does not understand her psychiatric problems enough to fill out the forms. Therefore she had it completed from her psychologist.     She otherwise denies any acute issues.     [facility]  was used for  this visit.      Problem, Medication and Allergy Lists were reviewed and are current.  Patient is an established patient of this clinic.         Review of Systems:   Review of Systems   Review Of Systems  Skin: negative except as above  Respiratory: negative except as above  Cardiovascular: negative except as above  Gastrointestinal: negative except as above  Genitourinary: negative except as above  Musculoskeletal: negative except as above  Neurologic: negative except as above  Psychiatric: negative except as above  Hematologic/Lymphatic/Immunologic: negative except as above  Endocrine: negative except as above       Physical Exam:   Patient Vitals for the past 24 hrs:   BP Temp Temp src Pulse Resp SpO2 Weight   10/11/17 1322 138/88 - - - - - -   10/11/17 1320 (!) 137/96 98.4  F (36.9  C) Oral 94 18 99 % 168 lb (76.2 kg)     Body mass index is 31.23 kg/(m^2).  Vitals were reviewed and were normal     Physical Exam   Constitutional: She is oriented to person, place, and time. appears well-developed and well-nourished.   HENT:   Head: Normocephalic and atraumatic.   Eyes: Conjunctivae are normal.   Cardiovascular: Normal peripheral perfusion.    Pulmonary/Chest: Effort normal. No respiratory distress.  Musculoskeletal: Normal range of motion.   Neurological: He is alert and oriented to person, place, and time.   Skin: Skin is warm and  dry.   Psychiatric: He has a normal mood and affect. His behavior is normal.       Assessment and Plan     Patient Instructions   Here is the plan from today's visit    1. Routine general medical examination at a health care facility  She is presenting for a doctors letter for department of homeland security. She failed her previous citizenship test and is now undergoing waiver for test. Form was completed by her psychologist and now is presenting for a letter stating her PCP does not understand her psychiatric problems enough to fill out the forms. Therefore she had it completed from her psychologist.   -Letter printed and given to her  -Deferred flu vaccine today      Please call or return to clinic if your symptoms don't go away.    Follow up plan  Please make a clinic appointment for follow up with your primary physician Baylee Garrido as needed.    Thank you for coming to [facility] today.    There are no discontinued medications.  Options for treatment and follow-up care were reviewed with the patient. Tonia Smith  engaged in the decision making process and verbalized understanding of the options discussed and agreed with the final plan.    Geri Richey MD

## 2017-10-11 NOTE — PROGRESS NOTES
Preceptor Attestation:   Patient seen and discussed with the resident. Assessment and plan reviewed with resident and agreed upon.   Supervising Physician:  Fausto Luna MD  [facility]

## 2017-10-11 NOTE — LETTER
October 11, 2017      Tonia Smith  [address]  [place] [address]        To whom it may concern,    After reviewing and reading the letter written by Gaye Yu, I have decided to explain my reasoning to not complete the [place]-[id number] form myself. Due to Tonia's extensive psychiatric illnesses and past, it is more reasonable for her psychologist to complete the form as this pertains to her cognitive ability. She has been seen here for other medical problems and this issue has not been addressed as this is a psychiatric etiology and have deferred to the specialists. We highly recommend having a specialized person who can understand the true cognitive ability so that the most accurate interpretation may be offered to the [employer] of [employer]. As of now, will differ to psychology care provider for this.       If you have any concerns please call and leave a message for me in the clinic.    Sincerely,    Geri Richey MD

## 2017-10-11 NOTE — MR AVS SNAPSHOT
After Visit Summary   10/11/2017    Tonia Smith    MRN: [id number]           Patient Information     Date Of Birth          1985        Visit Information        Provider Department      10/11/2017 1:00 PM Geri Richey MD [facility]        Today's Diagnoses     Routine general medical examination at a health care facility    -  1      Care Instructions    Here is the plan from today's visit    1. Routine general medical examination at a health care facility  She is presenting for a doctors letter for department of homeland security. She failed her previous citizenship test and is now undergoing waiver for test. Form was completed by her psychologist and now is presenting for a letter stating her PCP does not understand her psychiatric problems enough to fill out the forms. Therefore she had it completed from her psychologist.   -Letter printed and given to her  -Deferred flu vaccine today      Please call or return to clinic if your symptoms don't go away.    Follow up plan  Please make a clinic appointment for follow up with your primary physician Baylee Garrido as needed.    Thank you for coming to [facility] today.  Lab Testing:  **If you had lab testing today and your results are reassuring or normal they will be mailed to you or sent through Advaxis within 7 days.   **If the lab tests need quick action we will call you with the results.  The phone number we will call with results is # [phone] (home) . If this is not the best number please call our clinic and change the number.  Medication Refills:  If you need any refills please call your pharmacy and they will contact us.   If you need to  your refill at a new pharmacy, please contact the new pharmacy directly. The new pharmacy will help you get your medications transferred faster.   Scheduling:  If you have any concerns about today's visit or wish to schedule another appointment please call our  office during normal business hours [phone] (8-5:00 M-F)  If a referral was made to a [facility] and you don't get a call from central scheduling please call [phone].  If a Mammogram was ordered for you at [facility] call [phone] to schedule or change your appointment.  If you had an XRay/CT/Ultrasound/MRI ordered the number is [phone] to schedule or change your radiology appointment.   Medical Concerns:  If you have urgent medical concerns please call [phone] at any time of the day.            Follow-ups after your visit        Follow-up notes from your care team     Return if symptoms worsen or fail to improve.      Who to contact     Please call your clinic at [phone] to:    Ask questions about your health    Make or cancel appointments    Discuss your medicines    Learn about your test results    Speak to your doctor   If you have compliments or concerns about an experience at your clinic, or if you wish to file a complaint, please contact [facility] Patient Relations at [phone] or email us at [email or web address]         Additional Information About Your Visit        Xageekhart Information     VaporWiret is an electronic gateway that provides easy, online access to your medical records. With [employer]ire, you can request a clinic appointment, read your test results, renew a prescription or communicate with your care team.     To sign up for VaporWiret visit the website at [email or web address]   You will be asked to enter the access code listed below, as well as some personal information. Please follow the directions to create your username and password.     Your access code is: [email or web address]  Expires: 2018  2:25 PM     Your access code will  in 90 days. If you need help or a new code, please contact your [facility] or call [phone] for assistance.        Care EveryWhere ID      This is your Care EveryWhere ID. This could be used by other organizations to access your [place] medical records  [id number]        Your Vitals Were     Pulse Temperature Respirations Pulse Oximetry BMI (Body Mass Index)       94 98.4  F (36.9  C) (Oral) 18 99% 31.23 kg/m2        Blood Pressure from Last 3 Encounters:   10/11/17 138/88   06/29/17 116/83   06/12/17 125/86    Weight from Last 3 Encounters:   10/11/17 168 lb (76.2 kg)   06/29/17 174 lb (78.9 kg)   06/12/17 173 lb 3.2 oz (78.6 kg)              Today, you had the following     No orders found for display       Primary Care Provider Office Phone # Fax #    Baylee Garrido [phone]       [facility] [address] [place] [address]        Equal Access to Services     ESPERANZA PEÑA : Hadii aad ku hadasho Sojessica, waaxda luqadaha, qaybta kaalmada adeegyada, britney simons . So [facility] [phone].    ATENCIÓN: Si habla español, tiene a gordillo disposición servicios gratuitos de asistencia lingüística. Carissa al [phone].    We comply with applicable federal civil rights laws and [place] laws. We do not discriminate on the basis of race, color, national origin, age, disability, sex, sexual orientation, or gender identity.            Thank you!     Thank you for choosing [facility]  for your care. Our goal is always to provide you with excellent care. Hearing back from our patients is one way we can continue to improve our services. Please take a few minutes to complete the written survey that you may receive in the mail after your visit with us. Thank you!             Your Updated Medication List - Protect others around you: Learn how to safely use, store and throw away your medicines at www.disposemymeds.org.          This list is accurate as of: 10/11/17  2:25 PM.  Always use your most recent med list.                   Brand Name Dispense Instructions for  use Diagnosis    acetaminophen 500 MG tablet    TYLENOL    100 tablet    Take 1-2 tablets (500-1,000 mg) by mouth every 6 hours as needed for mild pain    Episodic tension-type headache, not intractable       ibuprofen 600 MG tablet    ADVIL/MOTRIN    30 tablet    Take 1 tablet (600 mg) by mouth every 6 hours as needed for moderate pain    Episodic tension-type headache, not intractable       loratadine 10 MG tablet    CLARITIN    30 tablet    Take 1 tablet (10 mg) by mouth daily    Dysfunction of Eustachian tube, bilateral       meclizine 25 MG tablet    ANTIVERT    20 tablet    Take 1 tablet (25 mg) by mouth 3 times daily as needed for dizziness    BPPV (benign paroxysmal positional vertigo), unspecified laterality       norethindrone 5 MG tablet    AYGESTIN    84 tablet    Take 1 tablet (5 mg) by mouth daily    Encounter for initial prescription of contraceptive pills

## 2017-10-11 NOTE — PATIENT INSTRUCTIONS
Here is the plan from today's visit    1. Routine general medical examination at a health care facility  She is presenting for a doctors letter for department of homeland security. She failed her previous citizenship test and is now undergoing waiver for test. Form was completed by her psychologist and now is presenting for a letter stating her PCP does not understand her psychiatric problems enough to fill out the forms. Therefore she had it completed from her psychologist.   -Letter printed and given to her  -Deferred flu vaccine today      Please call or return to clinic if your symptoms don't go away.    Follow up plan  Please make a clinic appointment for follow up with your primary physician Baylee Garirdo as needed.    Thank you for coming to [facility] today.  Lab Testing:  **If you had lab testing today and your results are reassuring or normal they will be mailed to you or sent through SNOBSWAP within 7 days.   **If the lab tests need quick action we will call you with the results.  The phone number we will call with results is # [phone] ([place]) . If this is not the best number please call our clinic and change the number.  Medication Refills:  If you need any refills please call your pharmacy and they will contact us.   If you need to  your refill at a new pharmacy, please contact the new pharmacy directly. The new pharmacy will help you get your medications transferred faster.   Scheduling:  If you have any concerns about today's visit or wish to schedule another appointment please call our office during normal business hours [phone] (8-5:00 M-F)  If a referral was made to a [facility] and you don't get a call from central scheduling please call [phone].  If a Mammogram was ordered for you at [facility] call [phone] to schedule or change your appointment.  If you had an XRay/CT/Ultrasound/MRI ordered the number is [phone] to schedule or  change your radiology appointment.   Medical Concerns:  If you have urgent medical concerns please call [phone] at any time of the day.

## 2017-10-11 NOTE — LETTER
October 11, 2017      Tonia Smith  [address] [facility]  [facility] [address]        To whom it may concern,    After reviewing and reading the letter written by Gaye Yu, I have decided to explain my reasoning to not complete the N-648 form myself. Due to Tonia's extensive psychiatric illnesses and past, it is more reasonable for her psychologist or psychiatrist to complete the form as this pertains to her cognitive ability. She has been seen here for other medical problems and this issue has not been addressed as this is a psychiatric etiology and have deferred to the specialists. We highly recommend having a specialized person who can understand the true cognitive ability so that the most accurate interpretation may be offered to the [employer]. As of now, will differ to psychiatric care provider for this.       If you have any concerns please call and leave a message for me in the clinic.    Sincerely,    Geri Richey MD

## 2018-02-28 ENCOUNTER — OFFICE VISIT (OUTPATIENT)
Dept: FAMILY MEDICINE | Facility: CLINIC | Age: 33
End: 2018-02-28
Payer: MEDICAID

## 2018-02-28 VITALS
DIASTOLIC BLOOD PRESSURE: 90 MMHG | HEART RATE: 90 BPM | OXYGEN SATURATION: 100 % | SYSTOLIC BLOOD PRESSURE: 135 MMHG | TEMPERATURE: 98.3 F | RESPIRATION RATE: 16 BRPM | BODY MASS INDEX: 30.41 KG/M2 | WEIGHT: 163.6 LBS

## 2018-02-28 DIAGNOSIS — M62.81 GENERALIZED MUSCLE WEAKNESS: ICD-10-CM

## 2018-02-28 DIAGNOSIS — M54.50 ACUTE LEFT-SIDED LOW BACK PAIN WITHOUT SCIATICA: Primary | ICD-10-CM

## 2018-02-28 RX ORDER — NAPROXEN 500 MG/1
500 TABLET ORAL 2 TIMES DAILY PRN
Qty: 30 TABLET | Refills: 0 | Status: SHIPPED | OUTPATIENT
Start: 2018-02-28 | End: 2018-03-02 | Stop reason: SINTOL

## 2018-02-28 NOTE — MR AVS SNAPSHOT
After Visit Summary   2/28/2018    Tonia Smith    MRN: [id number]           Patient Information     Date Of Birth          1985        Visit Information        Provider Department      2/28/2018 1:20 PM Baylee Garrido MD [facility]        Today's Diagnoses     Acute left-sided low back pain, with sciatica presence unspecified    -  1      Care Instructions    Make an appointment with Dr. Candelaria on Friday at 2pm for OMT          Follow-ups after your visit        Additional Services     [facility], PT, HAND AND CHIROPRACTIC REFERRAL - [facility]       **This order will print in the [facility] Scheduling Office**    Physical Therapy, Hand Therapy and Chiropractic Care are available through:    *[facility]  *[facility]  *[facility]    Call one number to schedule at any of the above locations: [phone].    Your provider has referred you to: Physical Therapy at [facility] or [facility]    Indication/Reason for Referral: Low Back Pain  Onset of Illness: 5 days-acute  Therapy Orders: Evaluate and Treat  Special Programs: Acupuncture if patient is willing  Special Request: Jacqueline Botello      Additional Comments for the Therapist or Chiropractor:     Please be aware that coverage of these services is subject to the terms and limitations of your health insurance plan.  Call member services at your health plan with any benefit or coverage questions.      Please bring the following to your appointment:    *Your personal calendar for scheduling future appointments  *Comfortable clothing                  Who to contact     Please call your clinic at [phone] to:    Ask questions about your health    Make or cancel appointments    Discuss your medicines    Learn about your test results    Speak to your doctor            Additional Information About Your Visit        [employer] Information     [employer] is an electronic gateway that provides easy, online  access to your medical records. With [employer], you can request a clinic appointment, read your test results, renew a prescription or communicate with your care team.     To sign up for [employer] visit the website at [email or web address]   You will be asked to enter the access code listed below, as well as some personal information. Please follow the directions to create your username and password.     Your access code is: [email or web address]  Expires: 2018  1:56 PM     Your access code will  in 90 days. If you need help or a new code, please contact your [facility] or call [phone] for assistance.        Care EveryWhere ID     This is your Care EveryWhere ID. This could be used by other organizations to access your [place] medical records  [id number]        Your Vitals Were     Pulse Temperature Respirations Pulse Oximetry BMI (Body Mass Index)       90 98.3  F (36.8  C) (Oral) 16 100% 30.41 kg/m2        Blood Pressure from Last 3 Encounters:   18 135/90   10/11/17 138/88   17 116/83    Weight from Last 3 Encounters:   18 163 lb 9.6 oz (74.2 kg)   10/11/17 168 lb (76.2 kg)   17 174 lb (78.9 kg)              We Performed the Following     [facility], PT, HAND AND CHIROPRACTIC REFERRAL - [facility]          Today's Medication Changes          These changes are accurate as of 18  1:56 PM.  If you have any questions, ask your nurse or doctor.               Start taking these medicines.        Dose/Directions    diclofenac 1 % Gel topical gel   Commonly known as:  VOLTAREN   Used for:  Acute left-sided low back pain, with sciatica presence unspecified   Started by:  Baylee Garrido MD        Apply 4 grams to knees or 2 grams to hands four times daily using enclosed dosing card.   Quantity:  100 g   Refills:  1       naproxen 500 MG tablet   Commonly known as:  NAPROSYN   Used for:  Acute left-sided low back pain, with sciatica presence unspecified   Started  by:  Baylee Garrido MD        Dose:  500 mg   Take 1 tablet (500 mg) by mouth 2 times daily as needed for moderate pain   Quantity:  30 tablet   Refills:  0            Where to get your medicines      These medications were sent to [facility] [place], [place] - [address], [facility] [address]     Phone:  [phone]     diclofenac 1 % Gel topical gel    naproxen 500 MG tablet                Primary Care Provider Office Phone # Fax #    Baylee Garrido [phone]       [facility] [address]   [facility] [address]        Equal Access to Services     [facility] : Hadii nakia brewster hadjacko Callum, waaxda lujohnathanadaha, qaybta kaalmada adelennieyada, britney simons . So [facility] [phone].    ATENCIÓN: Si habla español, tiene a gordillo disposición servicios gratuitos de asistencia lingüística. [facility] [phone].    We comply with applicable federal civil rights laws and [place] laws. We do not discriminate on the basis of race, color, national origin, age, disability, sex, sexual orientation, or gender identity.            Thank you!     Thank you for choosing [facility]  for your care. Our goal is always to provide you with excellent care. Hearing back from our patients is one way we can continue to improve our services. Please take a few minutes to complete the written survey that you may receive in the mail after your visit with us. Thank you!             Your Updated Medication List - Protect others around you: Learn how to safely use, store and throw away your medicines at www.disposemymeds.org.          This list is accurate as of 2/28/18  1:56 PM.  Always use your most recent med list.                   Brand Name Dispense Instructions for use Diagnosis    acetaminophen 500 MG tablet    TYLENOL    100 tablet    Take 1-2 tablets (500-1,000 mg) by mouth every 6 hours as needed for mild pain     Episodic tension-type headache, not intractable       diclofenac 1 % Gel topical gel    VOLTAREN    100 g    Apply 4 grams to knees or 2 grams to hands four times daily using enclosed dosing card.    Acute left-sided low back pain, with sciatica presence unspecified       ibuprofen 600 MG tablet    ADVIL/MOTRIN    30 tablet    Take 1 tablet (600 mg) by mouth every 6 hours as needed for moderate pain    Episodic tension-type headache, not intractable       loratadine 10 MG tablet    CLARITIN    30 tablet    Take 1 tablet (10 mg) by mouth daily    Dysfunction of Eustachian tube, bilateral       meclizine 25 MG tablet    ANTIVERT    20 tablet    Take 1 tablet (25 mg) by mouth 3 times daily as needed for dizziness    BPPV (benign paroxysmal positional vertigo), unspecified laterality       naproxen 500 MG tablet    NAPROSYN    30 tablet    Take 1 tablet (500 mg) by mouth 2 times daily as needed for moderate pain    Acute left-sided low back pain, with sciatica presence unspecified       norethindrone 5 MG tablet    AYGESTIN    84 tablet    Take 1 tablet (5 mg) by mouth daily    Encounter for initial prescription of contraceptive pills

## 2018-02-28 NOTE — PROGRESS NOTES
"      HPI:       Tonia Smith is a 33 year old who presents for the following  Patient presents with:  Back Pain: x's 5 days of low left side back pain. Feels stiff. L. arm and leg pain. Sitting and standing make it worse. No injury that the pt remembers. Hx of the pain. Done PT w/ some relief.  Headache: left side of head as well.     Reports left lower back pain x 5 days. Reports similar pain about the same time of year, 1 year ago. Worked with PT and experienced an improvement in pain.  She also reports headache yesterday evening, for which she took her \"headache medication,\" which provided relief for both headache and back pain. The medication she took is Aleve OTC.  She also reports associated left sided body muscle weakness. Reports palpable muscle knots in left leg. She denies any inciting incidents or injuries. She has 2 young children at home, 5yo and 1.4yo. Does not carry the infant on one side of her body more than the other. She also reports difficulties with gait due to weakness and pain. Has difficulty lifing her left leg. Also report a shooting pain in her left lower back, \"like twisting in my back.\" Denies fevers. No sick contacts. No sensory symptoms. No urinary or bowel symptoms.     An [employer]  was used for  this visit.      Problem, Medication and Allergy Lists were reviewed and are current.  Patient is an established patient of this clinic.         Review of Systems:   Review of Systems   Constitutional: Negative for chills, fatigue and fever.   HENT: Negative for congestion and rhinorrhea.    Eyes: Negative for visual disturbance.   Respiratory: Negative for cough and shortness of breath.    Cardiovascular: Negative for chest pain.   Gastrointestinal: Negative for nausea and vomiting.   Genitourinary: Negative for decreased urine volume and difficulty urinating.   Musculoskeletal: Positive for back pain, gait problem and myalgias.   Skin: Negative for rash.   Neurological: Positive " for weakness (left side of body) and headaches. Negative for syncope, speech difficulty, light-headedness and numbness.   Psychiatric/Behavioral: Negative for confusion.             Physical Exam:     No data found.    Body mass index is 30.41 kg/(m^2).  Vital signs normal except BMI     Physical Exam   Constitutional: She is oriented to person, place, and time.   HENT:   Head: Normocephalic and atraumatic.   Eyes: Pupils are equal, round, and reactive to light.   Cardiovascular: Normal rate.    Pulmonary/Chest: Effort normal.   Musculoskeletal:        Lumbar back: She exhibits decreased range of motion.        Back:    Decreased flexion and extension of lumbar spine.   Limited rotation of lumbar spine.  No bony spinal tenderness.   No significant abnormal curvature to spine    Neurological: She is alert and oriented to person, place, and time. No cranial nerve deficit.   Reflex Scores:       Patellar reflexes are 2+ on the right side and 2+ on the left side.   strength of RUE>LUE  RLE strength>LLE strength (more significant difference between R and L in lower extremity than upper extremity).  Normal vibratory sensation between LLE and RLE.  Patient has trouble transferring from sitting to standing position due to back pain.    Psychiatric:   Slightly flat affect  Poor eye contact       Results:     No results this visit.   Assessment and Plan     1. Acute left-sided low back pain without sciatica  2. Generalized muscle weakness  It appears that etiology is likely muscular weakness. No indication for emergent imaging at this time. Discussed management options with patient and agreement was made to pursue a conservative approach with oral naproxen, topical diclofenac, and re-establishing care with PT to work on muscle strengthening. Also discussed labs to rule out myopathy vs PMR and proximal muscle weakness in hypothyroidism. Also discussed OMT available at [facility] by a DO provider and patient is  interested in this. She is scheduled to see Dr. Candelaria for OMT on Friday 3/2. Will plan for labs to be collected at that time: TSH, ESR and CK.   - diclofenac (VOLTAREN) 1 % GEL topical gel; Apply 4 grams to knees or 2 grams to hands four times daily using enclosed dosing card.  Dispense: 100 g; Refill: 1  - [facility], PT, HAND AND CHIROPRACTIC REFERRAL - [facility]  - Erythrocyte sedimentation rate auto; Future  - TSH with free T4 reflex; Future  - CK total; Future    There are no discontinued medications.  Options for treatment and follow-up care were reviewed with the patient. Tonia Smith  engaged in the decision making process and verbalized understanding of the options discussed and agreed with the final plan.    Katiuska Garrido MD  Family Medicine PGY2

## 2018-02-28 NOTE — PROGRESS NOTES
Preceptor Attestation:   Patient seen and discussed with the resident. Assessment and plan reviewed with resident and agreed upon.  Supervising Physician:  Fausto Luna MD  [facility]

## 2018-03-01 ENCOUNTER — HOSPITAL ENCOUNTER (EMERGENCY)
Facility: CLINIC | Age: 33
Discharge: HOME | End: 2018-03-01
Attending: EMERGENCY MEDICINE | Admitting: EMERGENCY MEDICINE
Payer: MEDICAID

## 2018-03-01 ENCOUNTER — APPOINTMENT (OUTPATIENT)
Dept: CT IMAGING | Facility: CLINIC | Age: 33
End: 2018-03-01
Payer: MEDICAID

## 2018-03-01 VITALS
HEART RATE: 95 BPM | SYSTOLIC BLOOD PRESSURE: 126 MMHG | WEIGHT: 168 LBS | TEMPERATURE: 97.4 F | BODY MASS INDEX: 31.23 KG/M2 | DIASTOLIC BLOOD PRESSURE: 88 MMHG | RESPIRATION RATE: 16 BRPM | OXYGEN SATURATION: 100 %

## 2018-03-01 DIAGNOSIS — R51.9 NONINTRACTABLE EPISODIC HEADACHE, UNSPECIFIED HEADACHE TYPE: ICD-10-CM

## 2018-03-01 LAB
CREAT SERPL-MCNC: 0.42 MG/DL (ref 0.52–1.04)
GFR SERPL CREATININE-BSD FRML MDRD: >90 ML/MIN/1.7M2
HCG SERPL QL: NEGATIVE

## 2018-03-01 PROCEDURE — 96361 HYDRATE IV INFUSION ADD-ON: CPT | Performed by: EMERGENCY MEDICINE

## 2018-03-01 PROCEDURE — 70450 CT HEAD/BRAIN W/O DYE: CPT

## 2018-03-01 PROCEDURE — 84703 CHORIONIC GONADOTROPIN ASSAY: CPT | Performed by: STUDENT IN AN ORGANIZED HEALTH CARE EDUCATION/TRAINING PROGRAM

## 2018-03-01 PROCEDURE — 96374 THER/PROPH/DIAG INJ IV PUSH: CPT | Performed by: EMERGENCY MEDICINE

## 2018-03-01 PROCEDURE — 25000128 H RX IP 250 OP 636: Performed by: EMERGENCY MEDICINE

## 2018-03-01 PROCEDURE — 70496 CT ANGIOGRAPHY HEAD: CPT

## 2018-03-01 PROCEDURE — 82565 ASSAY OF CREATININE: CPT | Performed by: STUDENT IN AN ORGANIZED HEALTH CARE EDUCATION/TRAINING PROGRAM

## 2018-03-01 PROCEDURE — 25000125 ZZHC RX 250: Performed by: EMERGENCY MEDICINE

## 2018-03-01 PROCEDURE — 99285 EMERGENCY DEPT VISIT HI MDM: CPT | Mod: 25 | Performed by: EMERGENCY MEDICINE

## 2018-03-01 PROCEDURE — 99284 EMERGENCY DEPT VISIT MOD MDM: CPT | Mod: GC | Performed by: EMERGENCY MEDICINE

## 2018-03-01 PROCEDURE — 25000128 H RX IP 250 OP 636: Performed by: STUDENT IN AN ORGANIZED HEALTH CARE EDUCATION/TRAINING PROGRAM

## 2018-03-01 RX ORDER — IOPAMIDOL 755 MG/ML
100 INJECTION, SOLUTION INTRAVASCULAR ONCE
Status: COMPLETED | OUTPATIENT
Start: 2018-03-01 | End: 2018-03-01

## 2018-03-01 RX ORDER — SODIUM CHLORIDE 9 MG/ML
1000 INJECTION, SOLUTION INTRAVENOUS CONTINUOUS
Status: DISCONTINUED | OUTPATIENT
Start: 2018-03-01 | End: 2018-03-01

## 2018-03-01 RX ADMIN — SODIUM CHLORIDE 80 ML: 9 INJECTION, SOLUTION INTRAVENOUS at 22:28

## 2018-03-01 RX ADMIN — IOPAMIDOL 100 ML: 755 INJECTION, SOLUTION INTRAVENOUS at 22:28

## 2018-03-01 RX ADMIN — SODIUM CHLORIDE 1000 ML: 9 INJECTION, SOLUTION INTRAVENOUS at 21:13

## 2018-03-01 RX ADMIN — PROCHLORPERAZINE EDISYLATE 10 MG: 5 INJECTION INTRAMUSCULAR; INTRAVENOUS at 21:13

## 2018-03-01 ASSESSMENT — ENCOUNTER SYMPTOMS
DIFFICULTY URINATING: 0
PHOTOPHOBIA: 1
SPEECH DIFFICULTY: 0
CONFUSION: 0
WEAKNESS: 1
HEADACHES: 1
COUGH: 0
FEVER: 0
SHORTNESS OF BREATH: 0
CHEST TIGHTNESS: 0
RHINORRHEA: 0
ABDOMINAL PAIN: 0
BACK PAIN: 1
PALPITATIONS: 0
FATIGUE: 1
VOMITING: 0

## 2018-03-01 NOTE — LETTER
[facility], [place], EMERGENCY DEPARTMENT  [address]  [facility] [address]  Phone: [phone]  Fax: [phone]    March 1, 2018        Kam Lindsey[address] [facility] [address]  [facility] [address]          To whom it may concern:    Mr. Campos was present with a sick patient of mine in the emergency department this evening. Please excuse him from work, as he was caring for his ill loved one.         Please contact me for questions or concerns.      Sincerely,          Gilbert Stallworth DO MA

## 2018-03-01 NOTE — ED AVS SNAPSHOT
[facility], [place], Emergency Department    [address]    [facility] [address]    Phone:  [phone]    Fax:  [phone]                                       Tonia Smith   MRN: [id number]    Department:  [facility], Emergency Department   Date of Visit:  3/1/2018           After Visit Summary Signature Page     I have received my discharge instructions, and my questions have been answered. I have discussed any challenges I see with this plan with the nurse or doctor.    ..........................................................................................................................................  Patient/Patient Representative Signature      ..........................................................................................................................................  Patient Representative Print Name and Relationship to Patient    ..................................................               ................................................  Date                                            Time    ..........................................................................................................................................  Reviewed by Signature/Title    ...................................................              ..............................................  Date                                                            Time

## 2018-03-01 NOTE — ED AVS SNAPSHOT
[facility], Emergency Department    [address]    [email or web address] [address]    Phone:  [phone]    Fax:  [phone]                                       Tonia Smith   MRN: [id number]    Department:  [facility], Emergency Department   Date of Visit:  3/1/2018           Patient Information     Date Of Birth          1985        Your diagnoses for this visit were:     Nonintractable episodic headache, unspecified headache type        You were seen by Curly Skinner MD.        Discharge Instructions       Please keep your appointment with [facility] tomorrow at 2 PM.      Future Appointments        Provider Department Dept Phone Center    3/2/2018 2:00 PM Wanda Candelaria DO [facility] [phone] [facility]    3/8/2018 11:40 AM Taylor Gunter,  [facility] [phone] [facility]      24 Hour Appointment Hotline       To make an appointment at any [facility], call [phone] ([phone]). If you don't have a family doctor or clinic, we will help you find one. [facility] are conveniently located to serve the needs of you and your family.             Review of your medicines      Our records show that you are taking the medicines listed below. If these are incorrect, please call your family doctor or clinic.        Dose / Directions Last dose taken    acetaminophen 500 MG tablet   Commonly known as:  TYLENOL   Dose:  500-1000 mg   Quantity:  100 tablet        Take 1-2 tablets (500-1,000 mg) by mouth every 6 hours as needed for mild pain   Refills:  2        diclofenac 1 % Gel topical gel   Commonly known as:  VOLTAREN   Quantity:  100 g        Apply 4 grams to knees or 2 grams to hands four times daily using enclosed dosing card.   Refills:  1        ibuprofen 600 MG tablet   Commonly known as:  ADVIL/MOTRIN   Dose:  600 mg   Quantity:  30 tablet        Take 1 tablet (600 mg) by mouth every 6 hours as needed  for moderate pain   Refills:  1        loratadine 10 MG tablet   Commonly known as:  CLARITIN   Dose:  10 mg   Quantity:  30 tablet        Take 1 tablet (10 mg) by mouth daily   Refills:  6        meclizine 25 MG tablet   Commonly known as:  ANTIVERT   Dose:  25 mg   Quantity:  20 tablet        Take 1 tablet (25 mg) by mouth 3 times daily as needed for dizziness   Refills:  0        naproxen 500 MG tablet   Commonly known as:  NAPROSYN   Dose:  500 mg   Quantity:  30 tablet        Take 1 tablet (500 mg) by mouth 2 times daily as needed for moderate pain   Refills:  0        norethindrone 5 MG tablet   Commonly known as:  AYGESTIN   Dose:  5 mg   Quantity:  84 tablet        Take 1 tablet (5 mg) by mouth daily   Refills:  3                Procedures and tests performed during your visit     CT Head Neck Angio w/o & w Contrast    CT Head w/o Contrast    Creatinine    HCG qualitative Blood      Orders Needing Specimen Collection     None      Pending Results     Date and Time Order Name Status Description    3/1/2018 2047 CT Head Neck Angio w/o & w Contrast Preliminary             Pending Culture Results     No orders found from 2/27/2018 to 3/2/2018.            Pending Results Instructions     If you had any lab results that were not finalized at the time of your Discharge, you can call the ED Lab Result RN at [phone]. You will be contacted by this team for any positive Lab results or changes in treatment. The nurses are available 7 days a week from 10A to 6:30P.  You can leave a message 24 hours per day and they will return your call.        Thank you for choosing [facility]       Thank you for choosing [facility] for your care. Our goal is always to provide you with excellent care. Hearing back from our patients is one way we can continue to improve our services. Please take a few minutes to complete the written survey that you may receive in the mail after you visit with us. Thank you!        MyChart Information   "   [employer] lets you send messages to your doctor, view your test results, renew your prescriptions, schedule appointments and more. To sign up, go to [email or web address] . Click on \"Log in\" on the left side of the screen, which will take you to the Welcome page. Then click on \"Sign up Now\" on the right side of the page.     You will be asked to enter the access code listed below, as well as some personal information. Please follow the directions to create your username and password.     Your access code is: [email or web address]  Expires: 2018  1:56 PM     Your access code will  in 90 days. If you need help or a new code, please call your [place] clinic or [phone].        Care EveryWhere ID     This is your Care EveryWhere ID. This could be used by other organizations to access your [place] medical records  [id number]        Equal Access to Services     ESPERANZA PEÑA : Serena Nolen, clementine camacho, siva gustafson, britney simons . So [facility] [phone].    ATENCIÓN: Si habla español, tiene a gordillo disposición servicios gratuitos de asistencia lingüística. Llame al [phone].    We comply with applicable federal civil rights laws and [place] laws. We do not discriminate on the basis of race, color, national origin, age, disability, sex, sexual orientation, or gender identity.            After Visit Summary       This is your record. Keep this with you and show to your community pharmacist(s) and doctor(s) at your next visit.                  "

## 2018-03-02 ENCOUNTER — OFFICE VISIT (OUTPATIENT)
Dept: FAMILY MEDICINE | Facility: CLINIC | Age: 33
End: 2018-03-02
Payer: MEDICAID

## 2018-03-02 VITALS
SYSTOLIC BLOOD PRESSURE: 130 MMHG | HEART RATE: 85 BPM | DIASTOLIC BLOOD PRESSURE: 86 MMHG | OXYGEN SATURATION: 99 % | TEMPERATURE: 98.2 F

## 2018-03-02 DIAGNOSIS — M99.9 NONALLOPATHIC LESION OF SACRAL REGION: ICD-10-CM

## 2018-03-02 DIAGNOSIS — M99.9 NONALLOPATHIC LESION OF CERVICAL REGION: ICD-10-CM

## 2018-03-02 DIAGNOSIS — G44.219 EPISODIC TENSION-TYPE HEADACHE, NOT INTRACTABLE: ICD-10-CM

## 2018-03-02 DIAGNOSIS — M99.05 SOMATIC DYSFUNCTION OF PELVIC REGION: ICD-10-CM

## 2018-03-02 DIAGNOSIS — M99.9 NONALLOPATHIC LESION OF UPPER EXTREMITIES: ICD-10-CM

## 2018-03-02 DIAGNOSIS — M99.9 NONALLOPATHIC LESION OF RIB CAGE: ICD-10-CM

## 2018-03-02 DIAGNOSIS — M99.9 NONALLOPATHIC LESION OF LOWER EXTREMITIES: ICD-10-CM

## 2018-03-02 DIAGNOSIS — M99.9 NONALLOPATHIC LESION OF THORACIC REGION: ICD-10-CM

## 2018-03-02 DIAGNOSIS — M99.9 NONALLOPATHIC LESION OF LUMBAR REGION: ICD-10-CM

## 2018-03-02 DIAGNOSIS — M99.00 SOMATIC DYSFUNCTION OF HEAD REGION: ICD-10-CM

## 2018-03-02 RX ORDER — IBUPROFEN 200 MG
200-600 TABLET ORAL EVERY 6 HOURS PRN
Qty: 100 TABLET | Refills: 6 | Status: SHIPPED | OUTPATIENT
Start: 2018-03-02 | End: 2018-05-02 | Stop reason: SINTOL

## 2018-03-02 NOTE — PROGRESS NOTES
OMT Visit    Tonia Smith is a 33 year old year old female who is being seen today for OMT.    Chief Complaint: No chief complaint on file.      Past Medical History:   Diagnosis Date     Hypertension      Preeclampsia        Social History     Social History     Marital status:      Spouse name: N/A     Number of children: 0     Years of education: 9th grade     Occupational History     labor [employer]     Social History Main Topics     Smoking status: Never Smoker     Smokeless tobacco: Never Used     Alcohol use No     Drug use: No     Sexual activity: Yes     Partners: Male     Birth control/ protection: None     Other Topics Concern      Service No     Blood Transfusions No     Caffeine Concern No     Occupational Exposure No     Hobby Hazards No     Sleep Concern No     Stress Concern No     Weight Concern No     Special Diet No     Back Care No     Exercise No     Bike Helmet No     Seat Belt No     Self-Exams No     Social History Narrative         Current Outpatient Prescriptions:      naproxen (NAPROSYN) 500 MG tablet, Take 1 tablet (500 mg) by mouth 2 times daily as needed for moderate pain, Disp: 30 tablet, Rfl: 0     diclofenac (VOLTAREN) 1 % GEL topical gel, Apply 4 grams to knees or 2 grams to hands four times daily using enclosed dosing card., Disp: 100 g, Rfl: 1     acetaminophen (TYLENOL) 500 MG tablet, Take 1-2 tablets (500-1,000 mg) by mouth every 6 hours as needed for mild pain, Disp: 100 tablet, Rfl: 2     ibuprofen (ADVIL/MOTRIN) 600 MG tablet, Take 1 tablet (600 mg) by mouth every 6 hours as needed for moderate pain (Patient not taking: Reported on 10/11/2017), Disp: 30 tablet, Rfl: 1     meclizine (ANTIVERT) 25 MG tablet, Take 1 tablet (25 mg) by mouth 3 times daily as needed for dizziness (Patient not taking: Reported on 10/11/2017), Disp: 20 tablet, Rfl: 0     loratadine (CLARITIN) 10 MG tablet, Take 1 tablet (10 mg) by mouth daily (Patient not  taking: Reported on 10/11/2017), Disp: 30 tablet, Rfl: 6     norethindrone (AYGESTIN) 5 MG tablet, Take 1 tablet (5 mg) by mouth daily, Disp: 84 tablet, Rfl: 3  No current facility-administered medications for this visit.     OMT PROCEDURE NOTE    Body Region: {PV OMT Body1 Region:539027}  Somatic Dysfunction: ***  Treatment: {PV OMT TREATMENT:99748298}   Outcome: {PV OMT OUTCOME:650396}    Body Region: {PV OMT Body1 Region:885773}  Somatic Dysfunction: ***  Treatment: {PV OMT TREATMENT:13357624}   Outcome: {PV OMT OUTCOME:493414}    Body Region: {PV OMT Body1 Region:192008}  Somatic Dysfunction: ***  Treatment: {PV OMT TREATMENT:39331754}   Outcome: {PV OMT OUTCOME:472707}    Body Region: {PV OMT Body1 Region:877795}  Somatic Dysfunction: ***  Treatment: {PV OMT TREATMENT:64300037}   Outcome: {PV OMT OUTCOME:686129}    Body Region: {PV OMT Body1 Region:995224}  Somatic Dysfunction: ***  Treatment: {PV OMT TREATMENT:67366738}   Outcome: {PV OMT OUTCOME:170351}    Body Region: {PV OMT Body1 Region:591523}  Somatic Dysfunction: ***  Treatment: {PV OMT TREATMENT:78543407}   Outcome: {PV OMT OUTCOME:260883}    Body Region: {PV OMT Body1 Region:168817}  Somatic Dysfunction: ***  Treatment: {PV OMT TREATMENT:98986694}   Outcome: {PV OMT OUTCOME:083914}    Body Region: {PV OMT Body1 Region:768868}  Somatic Dysfunction: ***  Treatment: {PV OMT TREATMENT:55475890}   Outcome: {PV OMT OUTCOME:042729}    The potential risks and benefits of osteopathic manipulation treatment (OMT) were described to the patient, and consent was obtained. The patient actively participated in OMT and communicated using both positive and negative feedback throughout. The treatment was well tolerated, and ROM / function was restored. The patient stated that the pain was reduced after treatment.   The patient was advised to drink extra water after treatment today, and to take Tylenol or Ibuprofen if they experienced mild soreness. If they experience  any sudden changes in pain level or function, they should seek emergent assistance.

## 2018-03-02 NOTE — ED PROVIDER NOTES
"  History     Chief Complaint   Patient presents with     Headache     \"when she took the Naproxen at noon she has had severe headache after taking the pill\"     Patient is a 33 year old female presenting with headaches. The history is provided by the patient. The history is limited by a language barrier. A  was used ([place]).   Headache   Associated symptoms: back pain, fatigue, photophobia and weakness    Associated symptoms: no abdominal pain, no congestion, no cough, no fever and no vomiting      Tonia Smith is a 33 year old female with past medical history of headache and lumbar ago who presents emergency department with headache.  She was seen in her primary care office yesterday, with headache and back pain.  At the time she was given naproxen and referred for OMT, and has appointment tomorrow at 2 PM with primary clinic.  Patient states that naproxen helped initially, but today at 3 PM when she took naproxen again for headache her headache did not resolve.  She states her headache yesterday was on the left side behind her eye and it is now on the right side behind her eye.  Patient also admits fatigue, but denies vision changes, though does admit to photophobia nursing.  Patient also has weakness of her left lower extremity, which is documented in clinic note yesterday.    Patient states she uses topical Voltaren gel for low back pain, and is also on OCP.    I have reviewed the Medications, Allergies, Past Medical and Surgical History, and Social History in the Epic system.    Review of Systems   Constitutional: Positive for fatigue. Negative for fever.   HENT: Negative for congestion and rhinorrhea.    Eyes: Positive for photophobia. Negative for visual disturbance.   Respiratory: Negative for cough, chest tightness and shortness of breath.    Cardiovascular: Negative for chest pain, palpitations and leg swelling.   Gastrointestinal: Negative for abdominal pain and vomiting. "   Genitourinary: Negative for decreased urine volume and difficulty urinating.   Musculoskeletal: Positive for back pain.   Neurological: Positive for weakness and headaches. Negative for speech difficulty.   Psychiatric/Behavioral: Negative for confusion.       Physical Exam   BP: (!) 156/97  Heart Rate: 77  Temp: 97.4  F (36.3  C)  Resp: 16  Weight: 76.2 kg (168 lb)  SpO2: 100 %      Physical Exam   Constitutional: She is oriented to person, place, and time. She appears well-developed and well-nourished. No distress.   HENT:   Head: Normocephalic and atraumatic.   Right Ear: External ear normal.   Left Ear: External ear normal.   Mouth/Throat: Uvula is midline and oropharynx is clear and moist. No oropharyngeal exudate.   Eyes: Conjunctivae and EOM are normal. Pupils are equal, round, and reactive to light. No scleral icterus.   Neck: Neck supple. No JVD present. No tracheal deviation present.   Cardiovascular: Normal rate, regular rhythm, normal heart sounds and intact distal pulses.    Pulmonary/Chest: Breath sounds normal. No respiratory distress.   Abdominal: Soft. Bowel sounds are normal. There is no tenderness.   Musculoskeletal: She exhibits no edema or tenderness.   Neurological: She is alert and oriented to person, place, and time. No cranial nerve deficit. GCS eye subscore is 4. GCS verbal subscore is 5. GCS motor subscore is 6.   Right lower extremity strength 5/5.  Left lower extremity strength 4/5.   Skin: Skin is warm. No rash noted. She is not diaphoretic.   Psychiatric: She has a normal mood and affect. Her behavior is normal.       ED Course     ED Course   (2020) patient was seen and evaluated in the emergency department.  Patient afebrile, vitally stable.  GCS 15.  HCG, creatinine, normal saline bolus, head CT without contrast, head CT neck angio, and Compazine ordered.  (2145) creatinine not elevated.  HCG negative.  (2235) CT head showing no acute pathology.  CT angio head/neck showing no  stenosis, arterial dissection, occluded vessels, or aneurysms.  (7742) patient feeling improved.  Patient discharged home in stable condition.    Procedures        Critical Care time:  none       Labs Ordered and Resulted from Time of ED Arrival Up to the Time of Departure from the ED   CREATININE - Abnormal; Notable for the following:        Result Value    Creatinine 0.42 (*)     All other components within normal limits   HCG QUALITATIVE           Results for orders placed or performed during the hospital encounter of 03/01/18   CT Head w/o Contrast    Narrative    CT SCAN OF THE HEAD WITHOUT CONTRAST   3/1/2018 10:27 PM     HISTORY: right sided HA;     TECHNIQUE:  Axial images of the head and coronal reformations without  IV contrast material. Radiation dose for this scan was reduced using  automated exposure control, adjustment of the mA and/or kV according  to patient size, or iterative reconstruction technique.    COMPARISON: None.    FINDINGS:  The ventricles are normal in size, shape and configuration.   The brain parenchyma and subarachnoid spaces are normal. There is no  evidence of intracranial hemorrhage, mass, acute infarct or anomaly.  The visualized portions of the sinuses and mastoids appear normal.  There is no evidence of trauma.      Impression    IMPRESSION: No acute pathology, no bleed, mass, or infarcts are seen.      TEODORA BLAIR MD   CT Head Neck Angio w/o & w Contrast    Narrative    CTA ANGIOGRAM HEAD NECK  3/1/2018 10:29 PM     HISTORY: Ight sided HA;     TECHNIQUE:  Precontrast localizing scans were followed by CT  angiography with an injection of [id number], 100mls IV contrast with scans  through the head and neck.  Images were transferred to a separate 3-D  workstation where multiplanar reformations and 3-D images were  created.  Estimates of carotid stenoses are made relative to the  distal internal carotid artery diameters except as noted. Radiation  dose for this scan was reduced using  automated exposure control,  adjustment of the mA and/or kV according to patient size, or iterative  reconstruction technique.    COMPARISON: None.    FINDINGS: Estimates of stenosis at the carotid bifurcations are  relative to the distal internal carotids.    Arch: There is an aberrant right subclavian artery. This is a normal  variant.    Neck:  Right Carotid:  The right common carotid artery is normal.  The right  carotid bifurcation appears normal.  The right internal carotid artery  is negative.     Left Carotid:  The left common carotid artery is unremarkable.   The  left carotid bifurcation appears normal.  The left internal carotid is  negative.      Vertebrals:  The vertebral arteries are normal.    Head:  Right Carotid:No occluded vessels are seen. .    Left Carotid:  No occluded vessels are identified. .    Basilar:  The basilar artery and its branches appear normal. Fetal  origin of the left posterior cerebral. This is a normal variant.    Miscellaneous: The venous sinuses are patent..      Impression    IMPRESSION:   1. No stenosis is seen at either carotid bifurcation.  2. No arterial dissection is identified.  3. No occluded intracranial vessels are seen. No high-grade  intracranial vascular stenosis.  4. No aneurysms are identified on these images.   HCG qualitative Blood   Result Value Ref Range    HCG Qualitative Serum Negative NEG^Negative   Creatinine   Result Value Ref Range    Creatinine 0.42 (L) 0.52 - 1.04 mg/dL    GFR Estimate >90 >60 mL/min/1.7m2    GFR Estimate If Black >90 >60 mL/min/1.7m2         Assessments & Plan (with Medical Decision Making)   33-year-old female presents emergency room for evaluation of headache.    Differential diagnosis includes but not limited to tension headache, migraine, cerebrovascular event    On physical exam, patient alert and oriented, answering questions appropriately.  Patient was vitally stable and afebrile.  She admitted right sided headache and  weakness of left lower extremity, denied nausea vomiting, denied loss of consciousness or mental status change.  HCG was negative, and creatinine is not elevated. CT head showed no acute pathology and CT angio head/neck showed no stenosis, arterial dissection, occluded vessels, or aneurysms.  Patient did feel improved following 1 L bolus normal saline and Compazine.    Patient will discharge home in stable condition.  Recommended to follow-up with clinic tomorrow, patient appointment at 2 PM.    I have reviewed the nursing notes.    I have reviewed the findings, diagnosis, plan and need for follow up with the patient.  Attestation- Patient discussed with resident.  Above documentation accurately reflects history and physical.  Patient seen independently of house staff.  33 year old female to the emergency department with right-sided headache.  Patient does have history of headaches in the past but this headache over the past day has been of an unusual pattern.  She complains of photophobia.  She has not had any nausea or vomiting.  She does complain of some left leg weakness when ambulating but also has left-sided back pain on an ongoing basis.  Patient denies any bowel or bladder dysfunction.  No fever.  No recent trauma.  In the emergency department, the patient is resting comfortably in bed.  She has normal vital signs.  Her pupils are equal round and reactive to light.  Cranial nerves are normal.  No pronator drift.  No focal weakness on lower extremity strength testing although left leg strength evaluation appears limited by lack pain.  Cardiac exam is regular rate and rhythm without murmur.  Lungs clear to auscultation bilaterally.  Abdomen soft and nontender.  Patient had an IV established.  She was given normal saline.  She also given Compazine.  Her headache was improved after these therapies.  A CT/CTA was performed secondary to the change in character of her headache and reported left leg weakness.  The  studies were both normal.  Patient does not provide a history concerning for subarachnoid hemorrhage-no thunderclap headache or equivalent.  She also appears clinically well.  Patient will be discharged home.  She has follow-up planned at her primary care clinic tomorrow.    KAIA RICHARDSON MD     New Prescriptions    No medications on file       Final diagnoses:   Nonintractable episodic headache, unspecified headache type       3/1/2018   Gilbert Stallworth DO, MA  Family Medicine PGY-1  [facility], [place], EMERGENCY DEPARTMENT     Kaia Richardson MD  03/02/18 0023

## 2018-03-02 NOTE — ED NOTES
Patient took prescribed Naproxen today after lunch, and now has a very severe headache with photophobia. Yesterday had pain behind left eye, today the pain is behind the right.

## 2018-03-02 NOTE — PATIENT INSTRUCTIONS
Here is the plan from today's visit    1. Somatic dysfunction of head region  2. Nonallopathic lesion of cervical region  3. Nonallopathic lesion of thoracic region  4. Nonallopathic lesion of lumbar region  5. Nonallopathic lesion of sacral region  6. Somatic dysfunction of pelvic region  7. Nonallopathic lesion of lower extremities  8. Nonallopathic lesion of upper extremities  9. Nonallopathic lesion of rib cage  - OSTEOPATHIC MANIP,9-10 BODY REGN  - Drink water.  - Use Ibuprofen as directed as needed.  - Do back exercises 3x week up to every day.    10. Episodic tension-type headache, not intractable  - ibuprofen (ADVIL/MOTRIN) 200 MG tablet; Take 1-3 tablets (200-600 mg) by mouth every 6 hours as needed for moderate pain or fever  Dispense: 100 tablet; Refill: 6      Please call or return to clinic if your symptoms don't go away.    Follow up plan  Please make a clinic appointment for follow up with me (JAMES CANDELARIA) as needed for OMT.    Farheen Candelaria,      [facility]  [phone]  [facility]    Thank you for coming to [facility] today.  Lab Testing:  **If you had lab testing today and your results are reassuring or normal they will be mailed to you or sent through [employer] within 7 days.   **If the lab tests need quick action we will call you with the results.  The phone number we will call with results is # [phone] (home) . If this is not the best number please call our clinic and change the number.  Medication Refills:  If you need any refills please call your pharmacy and they will contact us.   If you need to  your refill at a new pharmacy, please contact the new pharmacy directly. The new pharmacy will help you get your medications transferred faster.   Scheduling:  If you have any concerns about today's visit or wish to schedule another appointment please call our office during normal business hours [phone] (8-5:00 M-F)  If a  referral was made to a [facility] and you don't get a call from central scheduling please call [phone].  If a Mammogram was ordered for you at [facility] call [phone] to schedule or change your appointment.  If you had an XRay/CT/Ultrasound/MRI ordered the number is [phone] to schedule or change your radiology appointment.   Medical Concerns:  If you have urgent medical concerns please call [phone] at any time of the day.

## 2018-03-02 NOTE — MR AVS SNAPSHOT
After Visit Summary   3/2/2018    Tonia Smith    MRN: [id number]           Patient Information     Date Of Birth          1985        Visit Information        Provider Department      3/2/2018 2:00 PM Wanda Candelaria DO [facility]        Today's Diagnoses     Somatic dysfunction of head region        Nonallopathic lesion of cervical region        Nonallopathic lesion of thoracic region        Nonallopathic lesion of lumbar region        Nonallopathic lesion of sacral region        Somatic dysfunction of pelvic region        Nonallopathic lesion of lower extremities        Nonallopathic lesion of upper extremities        Nonallopathic lesion of rib cage        Episodic tension-type headache, not intractable          Care Instructions    Here is the plan from today's visit    1. Somatic dysfunction of head region  2. Nonallopathic lesion of cervical region  3. Nonallopathic lesion of thoracic region  4. Nonallopathic lesion of lumbar region  5. Nonallopathic lesion of sacral region  6. Somatic dysfunction of pelvic region  7. Nonallopathic lesion of lower extremities  8. Nonallopathic lesion of upper extremities  9. Nonallopathic lesion of rib cage  - OSTEOPATHIC MANIP,9-10 BODY REGN  - Drink water.  - Use Ibuprofen as directed as needed.  - Do back exercises 3x week up to every day.    10. Episodic tension-type headache, not intractable  - ibuprofen (ADVIL/MOTRIN) 200 MG tablet; Take 1-3 tablets (200-600 mg) by mouth every 6 hours as needed for moderate pain or fever  Dispense: 100 tablet; Refill: 6      Please call or return to clinic if your symptoms don't go away.    Follow up plan  Please make a clinic appointment for follow up with me (WANDA CANDELARIA) as needed for OMT.    DO [facility]  [phone]  [facility]    Thank you for coming to [facility] today.  Lab Testing:  **If you had lab testing today  and your results are reassuring or normal they will be mailed to you or sent through [employer] within 7 days.   **If the lab tests need quick action we will call you with the results.  The phone number we will call with results is # [phone] (home) . If this is not the best number please call our clinic and change the number.  Medication Refills:  If you need any refills please call your pharmacy and they will contact us.   If you need to  your refill at a new pharmacy, please contact the new pharmacy directly. The new pharmacy will help you get your medications transferred faster.   Scheduling:  If you have any concerns about today's visit or wish to schedule another appointment please call our office during normal business hours [phone] (8-5:00 M-F)  If a referral was made to a [facility] and you don't get a call from central scheduling please call [phone].  If a Mammogram was ordered for you at [facility] call [phone] to schedule or change your appointment.  If you had an XRay/CT/Ultrasound/MRI ordered the number is [phone] to schedule or change your radiology appointment.   Medical Concerns:  If you have urgent medical concerns please call [phone] at any time of the day.              Follow-ups after your visit        Your next 10 appointments already scheduled     Mar 08, 2018 11:40 AM CST   (Arrive by 11:25 AM)   FABIAN Spine with Taylor Gunter PT   [place] [facility] ([facility]  )    [address]  [place] [address] [phone]              Who to contact     Please call your clinic at [phone] to:    Ask questions about your health    Make or cancel appointments    Discuss your medicines    Learn about your test results    Speak to your doctor            Additional Information About Your Visit        [employer] Information     [employer] is an electronic gateway that provides easy,  online access to your medical records. With [employer], you can request a clinic appointment, read your test results, renew a prescription or communicate with your care team.     To sign up for [employer] visit the website at [email or web address]   You will be asked to enter the access code listed below, as well as some personal information. Please follow the directions to create your username and password.     Your access code is: [email or web address]  Expires: 2018  1:56 PM     Your access code will  in 90 days. If you need help or a new code, please contact your [facility] or call [phone] for assistance.        Care EveryWhere ID     This is your Care EveryWhere ID. This could be used by other organizations to access your [place] medical records  [id number]        Your Vitals Were     Pulse Temperature Pulse Oximetry Breastfeeding?          85 98.2  F (36.8  C) (Oral) 99% No         Blood Pressure from Last 3 Encounters:   18 130/86   18 126/88   18 135/90    Weight from Last 3 Encounters:   18 168 lb (76.2 kg)   18 163 lb 9.6 oz (74.2 kg)   10/11/17 168 lb (76.2 kg)              We Performed the Following     OSTEOPATHIC MANIP,9-10 BODY REGN          Today's Medication Changes          These changes are accurate as of 3/2/18  2:41 PM.  If you have any questions, ask your nurse or doctor.               These medicines have changed or have updated prescriptions.        Dose/Directions    ibuprofen 200 MG tablet   Commonly known as:  ADVIL/MOTRIN   This may have changed:    - medication strength  - how much to take  - reasons to take this   Used for:  Episodic tension-type headache, not intractable   Changed by:  Wanda Candelaria DO        Dose:  200-600 mg   Take 1-3 tablets (200-600 mg) by mouth every 6 hours as needed for moderate pain or fever   Quantity:  100 tablet   Refills:  6         Stop taking these medicines if you haven't already.  Please contact your care team if you have questions.     naproxen 500 MG tablet   Commonly known as:  NAPROSYN   Stopped by:  Wanda Candelaria, DO                Where to get your medicines      These medications were sent to [facility] [place], [place] - [address], [facility] [address]     Phone:  [phone]     ibuprofen 200 MG tablet                Primary Care Provider Office Phone # Fax #    Baylee MD Radhika [phone]       [facility] [address]   [facility] [address]        Equal Access to Services     [facility]: Hadii aad ku hadasho Soomaali, waaxda luqadaha, qaybta kaalmada adelennieyada, britney simons . So [facility] [phone].    ATENCIÓN: Si habla español, tiene a gordillo disposición servicios gratuitos de asistencia lingüística. [facility] [phone].    We comply with applicable federal civil rights laws and [place] laws. We do not discriminate on the basis of race, color, national origin, age, disability, sex, sexual orientation, or gender identity.            Thank you!     Thank you for choosing [facility]  for your care. Our goal is always to provide you with excellent care. Hearing back from our patients is one way we can continue to improve our services. Please take a few minutes to complete the written survey that you may receive in the mail after your visit with us. Thank you!             Your Updated Medication List - Protect others around you: Learn how to safely use, store and throw away your medicines at www.disposemymeds.org.          This list is accurate as of 3/2/18  2:41 PM.  Always use your most recent med list.                   Brand Name Dispense Instructions for use Diagnosis    acetaminophen 500 MG tablet    TYLENOL    100 tablet    Take 1-2 tablets (500-1,000 mg) by mouth every 6 hours as needed for mild pain    Episodic tension-type headache, not  intractable       diclofenac 1 % Gel topical gel    VOLTAREN    100 g    Apply 4 grams to knees or 2 grams to hands four times daily using enclosed dosing card.    Acute left-sided low back pain, with sciatica presence unspecified       ibuprofen 200 MG tablet    ADVIL/MOTRIN    100 tablet    Take 1-3 tablets (200-600 mg) by mouth every 6 hours as needed for moderate pain or fever    Episodic tension-type headache, not intractable       norethindrone 5 MG tablet    AYGESTIN    84 tablet    Take 1 tablet (5 mg) by mouth daily    Encounter for initial prescription of contraceptive pills

## 2018-03-03 ASSESSMENT — ENCOUNTER SYMPTOMS
SHORTNESS OF BREATH: 0
NAUSEA: 0
VOMITING: 0
SPEECH DIFFICULTY: 0
MYALGIAS: 1
LIGHT-HEADEDNESS: 0
FEVER: 0
DIFFICULTY URINATING: 0
CONFUSION: 0
HEADACHES: 1
COUGH: 0
WEAKNESS: 1
BACK PAIN: 1
NUMBNESS: 0
RHINORRHEA: 0
CHILLS: 0
FATIGUE: 0

## 2018-03-08 ENCOUNTER — THERAPY VISIT (OUTPATIENT)
Dept: PHYSICAL THERAPY | Facility: CLINIC | Age: 33
End: 2018-03-08
Attending: FAMILY MEDICINE
Payer: MEDICAID

## 2018-03-08 DIAGNOSIS — M54.42 ACUTE LEFT-SIDED LOW BACK PAIN WITH LEFT-SIDED SCIATICA: Primary | ICD-10-CM

## 2018-03-08 PROCEDURE — 97014 ELECTRIC STIMULATION THERAPY: CPT | Mod: GP | Performed by: PHYSICAL THERAPIST

## 2018-03-08 PROCEDURE — T1013 SIGN LANG/ORAL INTERPRETER: HCPCS | Mod: U3 | Performed by: PHYSICAL THERAPIST

## 2018-03-08 PROCEDURE — 97110 THERAPEUTIC EXERCISES: CPT | Mod: GP | Performed by: PHYSICAL THERAPIST

## 2018-03-08 PROCEDURE — 97161 PT EVAL LOW COMPLEX 20 MIN: CPT | Mod: GP | Performed by: PHYSICAL THERAPIST

## 2018-03-08 NOTE — LETTER
DEPARTMENT OF HEALTH AND HUMAN SERVICES  CENTERS FOR MEDICARE & MEDICAID SERVICES    PLAN/UPDATED PLAN OF PROGRESS FOR OUTPATIENT REHABILITATION    PATIENTS NAME:  Tonia Smith   : 1985  PROVIDER NUMBER:    [id number]  [id number]   PROVIDER NAME: [place] [facility]  MEDICAL RECORD NUMBER: [id number]   START OF CARE DATE:  SOC Date: 18   TYPE:  PT  PRIMARY/TREATMENT DIAGNOSIS: (Pertinent Medical Diagnosis)  Acute left-sided low back pain with left-sided sciatica    VISITS FROM START OF CARE:  Rxs Used: 1     [place] [facility] Initial Evaluation  Subjective:  Patient is a 33 year old female presenting with rehab back hpi. The history is provided by the patient. The history is limited by a language barrier. A  was used.   Tonia Smith is a 33 year old female with a lumbar condition.    Condition occurred: for unknown reasons.  This is a new condition  Started having pain 3 weeks ago in 2018.  Came on for unknown reasons, but came on while sitting.  Patient has a 1.5 year old and has had this happen 1 other time after the baby was born..    Patient reports pain:  Lumbar spine left.  Radiates to:  Gluteals left, knee left and lower leg left.  Pain is described as aching and is constant and reported as 8/10.  Associated symptoms:  Loss of motion/stiffness. Pain is worse during the day.  Symptoms are exacerbated by bending and lifting (taking care of child, getting up from sitting) and relieved by heat (sitting weight shifted to the R, pain meds).  Since onset symptoms are unchanged.    Previous treatment: Saw a DO and helped a little.                     General health as reported by patient is excellent.  Pertinent medical history includes:  Other (headache).    Other surgeries include:  Other (c section).  Current medications:  Pain medication and other (birth control, ointment).      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  Barriers include:  None as reported by patient.  Red flags:  None as reported by patient.  Oswestry Score: 0 %               Objective:  Standing Alignment:    Lumbar:  Lateral shift R  Gait:    Gait Type:  Antalgic   Assistive Devices:  None  Deviations:  Lumbar:  Trunk lean RGeneral Deviations:  [place] decr  Lumbar/SI Evaluation  ROM:    AROM Lumbar:   Flexion:          Mid thigh++  Ext:                    Neutral+   Side Bend:        Left:     Right:   Rotation:           Left:     Right:   Side Glide:        Left:  75%    Right:  50%+          Lumbar Myotomes:  Lumbar myotomes: too painful to do all motions to get accurate assessment.  Neural Tension/Mobility:    Left side:  Slump positive.   Right side:   Slump positive.  Lumbar Palpation:    Tenderness present at Left:    Quadratus Lumborum; Erector Spinae and PSIS  Tenderness present at Right: Quadratus Lumborum; Erector Spinae and PSIS  Functional Tests:  Core strength and proprioception lumbar: slow and difficult with all transfers sit/stand and bed/stand.    Assessment/Plan:    Patient is a 33 year old female with lumbar complaints.    Patient has the following significant findings with corresponding treatment plan.                Diagnosis 1:  LBP with L radiculopathy  Pain -  hot/cold therapy, electric stimulation, manual therapy, self management, education, directional preference exercise and home program  Decreased ROM/flexibility - manual therapy, therapeutic exercise and home program  Decreased strength - therapeutic exercise, therapeutic activities and home program  Impaired gait - gait training and home program  Impaired muscle performance - neuro re-education  Decreased function - therapeutic activities, home program and functional performance testing  Impaired posture - neuro re-education and home program  Therapy Evaluation Codes:   1) History comprised of:   Personal factors that impact the  plan of care:      Language.    Comorbidity factors that impact the plan of care are:      None.     Medications impacting care: None.  2) Examination of Body Systems comprised of:   Body structures and functions that impact the plan of care:      Lumbar spine.   Activity limitations that impact the plan of care are:      Bathing, Bending, Cooking, Dressing, Lifting, Sitting, Standing, Walking and Laying down.  3) Clinical presentation characteristics are:   Stable/Uncomplicated.  4) Decision-Making    Low complexity using standardized patient assessment instrument and/or measureable assessment of functional outcome.  Cumulative Therapy Evaluation is: Low complexity.    Previous and current functional limitations:  (See Goal Flow Sheet for this information)    Short term and Long term goals: (See Goal Flow Sheet for this information)     Communication ability:  Patient appears to be able to clearly communicate and understand verbal and written communication and follow directions correctly.  Patient has an  for communication clarity.  Treatment Explanation - The following has been discussed with the patient:   RX ordered/plan of care  Anticipated outcomes  Possible risks and side effects  This patient would benefit from PT intervention to resume normal activities.   Rehab potential is good.    Frequency:  1 X week, twice daily  Duration:  for 8 weeks tapering to 2 X a month over 4 weeks  Discharge Plan:  Achieve all LTG.  Independent in home treatment program.  Reach maximal therapeutic benefit.      Caregiver Signature/Credentials _____________________________ Date ________       Treating Provider: jahaira Hobbs   I have reviewed and certified the need for these services and plan of treatment while under my care.        PHYSICIAN'S SIGNATURE:   _________________________________________  Date___________   Gael Neely MD    Certification period:  Beginning of Cert date period: 03/08/18 to  End of  "Cert period date: 06/05/18     Functional Level Progress Report: Please see attached \"Goal Flow sheet for Functional level.\"    ____X____ Continue Services or       ________ DC Services                Service dates: From  SOC Date: 03/08/18 date to present                         "

## 2018-03-08 NOTE — MR AVS SNAPSHOT
After Visit Summary   3/8/2018    Tonia Smith    MRN: [id number]           Patient Information     Date Of Birth          1985        Visit Information        Provider Department      3/8/2018 11:25 AM Taylor Gunter PT; [employer] [place] [facility]        Today's Diagnoses     Acute left-sided low back pain with left-sided sciatica    -  1       Follow-ups after your visit        Your next 10 appointments already scheduled     Mar 09, 2018 10:00 AM CST   Return Visit with DO [facility] ([facility])    [address],  [address]  [facility] [address]   [phone]            Mar 20, 2018  9:40 AM CDT   FABIAN Spine with Taylor Gunter PT   [place] [facility] ([facility]  )    [address]  [facility] [id number] [phone]              Who to contact     If you have questions or need follow up information about today's clinic visit or your schedule please contact [place] [facility] directly at [phone].  Normal or non-critical lab and imaging results will be communicated to you by MyChart, letter or phone within 4 business days after the clinic has received the results. If you do not hear from us within 7 days, please contact the clinic through MyChart or phone. If you have a critical or abnormal lab result, we will notify you by phone as soon as possible.  Submit refill requests through [employer] or call your pharmacy and they will forward the refill request to us. Please allow 3 business days for your refill to be completed.          Additional Information About Your Visit        [employer] Information     [employer] lets you send messages to your doctor, view your test results, renew your prescriptions, schedule appointments and more. To sign up, go to [email or web address] . Click on  "\"Log in\" on the left side of the screen, which will take you to the Welcome page. Then click on \"Sign up Now\" on the right side of the page.     You will be asked to enter the access code listed below, as well as some personal information. Please follow the directions to create your username and password.     Your access code is: [email or web address]  Expires: 2018  1:56 PM     Your access code will  in 90 days. If you need help or a new code, please call your [place] clinic or [phone].        Care EveryWhere ID     This is your Care EveryWhere ID. This could be used by other organizations to access your [place] medical records  [id number]         Blood Pressure from Last 3 Encounters:   18 130/86   18 126/88   18 135/90    Weight from Last 3 Encounters:   18 76.2 kg (168 lb)   18 74.2 kg (163 lb 9.6 oz)   10/11/17 76.2 kg (168 lb)              We Performed the Following     ELECTRIC STIMULATION THERAPY     HC PT EVAL, LOW COMPLEXITY     FABIAN CERT REPORT     FABIAN INITIAL EVAL REPORT     THERAPEUTIC EXERCISES        Primary Care Provider Office Phone # Fax #    Jaguardarrell MD Radhika [phone]       [facility] [address] [address]        Equal Access to Services     ESPERANZA PEÑA : Hadii nakia Nolen, waaxda luqadaha, qaybta kaalmayesica gustafson, britney simons . So [facility] [phone].    ATENCIÓN: Si habla español, tiene a gordillo disposición servicios gratuitos de asistencia lingüística. Carissa al [phone].    We comply with applicable federal civil rights laws and [place] laws. We do not discriminate on the basis of race, color, national origin, age, disability, sex, sexual orientation, or gender identity.            Thank you!     Thank you for choosing [facility]  for your care. Our goal is always to provide you with excellent care. Hearing " back from our patients is one way we can continue to improve our services. Please take a few minutes to complete the written survey that you may receive in the mail after your visit with us. Thank you!             Your Updated Medication List - Protect others around you: Learn how to safely use, store and throw away your medicines at www.disposemymeds.org.          This list is accurate as of 3/8/18  3:42 PM.  Always use your most recent med list.                   Brand Name Dispense Instructions for use Diagnosis    acetaminophen 500 MG tablet    TYLENOL    100 tablet    Take 1-2 tablets (500-1,000 mg) by mouth every 6 hours as needed for mild pain    Episodic tension-type headache, not intractable       diclofenac 1 % Gel topical gel    VOLTAREN    100 g    Apply 4 grams to knees or 2 grams to hands four times daily using enclosed dosing card.    Acute left-sided low back pain without sciatica       ibuprofen 200 MG tablet    ADVIL/MOTRIN    100 tablet    Take 1-3 tablets (200-600 mg) by mouth every 6 hours as needed for moderate pain or fever    Episodic tension-type headache, not intractable       norethindrone 5 MG tablet    AYGESTIN    84 tablet    Take 1 tablet (5 mg) by mouth daily    Encounter for initial prescription of contraceptive pills

## 2018-03-08 NOTE — PROGRESS NOTES
[facility] Initial Evaluation  Subjective:  Patient is a 33 year old female presenting with rehab back hpi. The history is provided by the patient. The history is limited by a language barrier. A  was used.   Tonia Smith is a 33 year old female with a lumbar condition.    Condition occurred: for unknown reasons.  This is a new condition  Started having pain 3 weeks ago in feb 2018.  Came on for unknown reasons, but came on while sitting.  Patient has a 1.5 year old and has had this happen 1 other time after the baby was born..    Patient reports pain:  Lumbar spine left.  Radiates to:  Gluteals left, knee left and lower leg left.  Pain is described as aching and is constant and reported as 8/10.  Associated symptoms:  Loss of motion/stiffness. Pain is worse during the day.  Symptoms are exacerbated by bending and lifting (taking care of child, getting up from sitting) and relieved by heat (sitting weight shifted to the R, pain meds).  Since onset symptoms are unchanged.    Previous treatment: Saw a DO and helped a little.                                                  Objective:  Standing Alignment:        Lumbar:  Lateral shift R            Gait:    Gait Type:  Antalgic   Assistive Devices:  None  Deviations:  Lumbar:  Trunk lean RGeneral Deviations:  [place] decr               Lumbar/SI Evaluation  ROM:    AROM Lumbar:   Flexion:          Mid thigh++  Ext:                    Neutral+   Side Bend:        Left:     Right:   Rotation:           Left:     Right:   Side Glide:        Left:  75%    Right:  50%+          Lumbar Myotomes:  Lumbar myotomes: too painful to do all motions to get accurate assessment.                  Neural Tension/Mobility:    Left side:  Slump positive.   Right side:   Slump positive.  Lumbar Palpation:    Tenderness present at Left:    Quadratus Lumborum; Erector Spinae and PSIS  Tenderness present at Right: Quadratus Lumborum; Erector Spinae  and PSIS  Functional Tests:  Core strength and proprioception lumbar: slow and difficult with all transfers sit/stand and bed/stand.                                                         General     ROS    Assessment/Plan:    Patient is a 33 year old female with lumbar complaints.    Patient has the following significant findings with corresponding treatment plan.                Diagnosis 1:  LBP with L radiculopathy  Pain -  hot/cold therapy, electric stimulation, manual therapy, self management, education, directional preference exercise and home program  Decreased ROM/flexibility - manual therapy, therapeutic exercise and home program  Decreased strength - therapeutic exercise, therapeutic activities and home program  Impaired gait - gait training and home program  Impaired muscle performance - neuro re-education  Decreased function - therapeutic activities, home program and functional performance testing  Impaired posture - neuro re-education and home program    Therapy Evaluation Codes:   1) History comprised of:   Personal factors that impact the plan of care:      Language.    Comorbidity factors that impact the plan of care are:      None.     Medications impacting care: None.  2) Examination of Body Systems comprised of:   Body structures and functions that impact the plan of care:      Lumbar spine.   Activity limitations that impact the plan of care are:      Bathing, Bending, Cooking, Dressing, Lifting, Sitting, Standing, Walking and Laying down.  3) Clinical presentation characteristics are:   Stable/Uncomplicated.  4) Decision-Making    Low complexity using standardized patient assessment instrument and/or measureable assessment of functional outcome.  Cumulative Therapy Evaluation is: Low complexity.    Previous and current functional limitations:  (See Goal Flow Sheet for this information)    Short term and Long term goals: (See Goal Flow Sheet for this information)     Communication ability:   Patient appears to be able to clearly communicate and understand verbal and written communication and follow directions correctly.  Patient has an  for communication clarity.  Treatment Explanation - The following has been discussed with the patient:   RX ordered/plan of care  Anticipated outcomes  Possible risks and side effects  This patient would benefit from PT intervention to resume normal activities.   Rehab potential is good.    Frequency:  1 X week, twice daily  Duration:  for 8 weeks tapering to 2 X a month over 4 weeks  Discharge Plan:  Achieve all LTG.  Independent in home treatment program.  Reach maximal therapeutic benefit.    Please refer to the daily flowsheet for treatment today, total treatment time and time spent performing 1:1 timed codes.     However, this patient no showed for her follow up visit on 3/20 and hasn't returned since.  Current status is unknown and plan to DC PT due to not returning.

## 2018-03-09 ENCOUNTER — OFFICE VISIT (OUTPATIENT)
Dept: FAMILY MEDICINE | Facility: CLINIC | Age: 33
End: 2018-03-09
Payer: MEDICAID

## 2018-03-09 VITALS
BODY MASS INDEX: 30.86 KG/M2 | TEMPERATURE: 98.6 F | DIASTOLIC BLOOD PRESSURE: 83 MMHG | RESPIRATION RATE: 16 BRPM | HEART RATE: 83 BPM | SYSTOLIC BLOOD PRESSURE: 116 MMHG | WEIGHT: 166 LBS | OXYGEN SATURATION: 98 %

## 2018-03-09 DIAGNOSIS — M99.9 NONALLOPATHIC LESION OF CERVICAL REGION: ICD-10-CM

## 2018-03-09 DIAGNOSIS — M99.9 NONALLOPATHIC LESION OF LOWER EXTREMITIES: ICD-10-CM

## 2018-03-09 DIAGNOSIS — M99.9 NONALLOPATHIC LESION OF UPPER EXTREMITIES: ICD-10-CM

## 2018-03-09 DIAGNOSIS — M99.00 SOMATIC DYSFUNCTION OF HEAD REGION: ICD-10-CM

## 2018-03-09 DIAGNOSIS — Z30.011 ENCOUNTER FOR INITIAL PRESCRIPTION OF CONTRACEPTIVE PILLS: ICD-10-CM

## 2018-03-09 DIAGNOSIS — M99.9 NONALLOPATHIC LESION OF SACRAL REGION: ICD-10-CM

## 2018-03-09 DIAGNOSIS — M99.9 NONALLOPATHIC LESION OF RIB CAGE: ICD-10-CM

## 2018-03-09 DIAGNOSIS — M99.9 NONALLOPATHIC LESION OF LUMBAR REGION: ICD-10-CM

## 2018-03-09 DIAGNOSIS — M99.9 NONALLOPATHIC LESION OF THORACIC REGION: ICD-10-CM

## 2018-03-09 DIAGNOSIS — M99.05 SOMATIC DYSFUNCTION OF PELVIC REGION: ICD-10-CM

## 2018-03-09 RX ORDER — NORETHINDRONE ACETATE 5 MG
5 TABLET ORAL DAILY
Qty: 84 TABLET | Refills: 3 | Status: SHIPPED | OUTPATIENT
Start: 2018-03-09 | End: 2018-03-23

## 2018-03-09 NOTE — MR AVS SNAPSHOT
After Visit Summary   3/9/2018    Tonia Smith    MRN: [id number]           Patient Information     Date Of Birth          1985        Visit Information        Provider Department      3/9/2018 10:00 AM Wanda Candelaria DO [facility]        Today's Diagnoses     Encounter for initial prescription of contraceptive pills        Somatic dysfunction of head region        Nonallopathic lesion of cervical region        Nonallopathic lesion of thoracic region        Nonallopathic lesion of lumbar region        Nonallopathic lesion of sacral region        Somatic dysfunction of pelvic region        Nonallopathic lesion of lower extremities        Nonallopathic lesion of upper extremities        Nonallopathic lesion of rib cage          Care Instructions    Here is the plan from today's visit    1. Encounter for refill prescription of contraceptive pills  - norethindrone (AYGESTIN) 5 MG tablet; Take 1 tablet (5 mg) by mouth daily  Dispense: 84 tablet; Refill: 3    2. Somatic dysfunction of head region  3. Nonallopathic lesion of cervical region  4. Nonallopathic lesion of thoracic region  5. Nonallopathic lesion of lumbar region  6. Nonallopathic lesion of sacral region  7. Somatic dysfunction of pelvic region  8. Nonallopathic lesion of lower extremities  9. Nonallopathic lesion of upper extremities  10. Nonallopathic lesion of rib cage  - OSTEOPATHIC MANIP,9-10 BODY REGION  - Drink water  - Take Tylenol or Ibuprofen as indicated for pain  - GO to PT  - [facility], PT, HAND AND CHIROPRACTIC REFERRAL - [facility]  - Try to sit and stand and walk straight.      Please call or return to clinic if your symptoms don't go away.    Follow up plan  Please make a clinic appointment for follow up with me (WANDA CANDELARIA) as needed for OMT.    Thank you for coming to [facility] today!  [facility]  [phone]  [facility]    Lab  Testing:  **If you had lab testing today and your results are reassuring or normal they will be mailed to you or sent through "[employer] within 7 days.   **If the lab tests need quick action we will call you with the results.  The phone number we will call with results is # [phone] (home) . If this is not the best number please call our clinic and change the number.  Medication Refills:  If you need any refills please call your pharmacy and they will contact us.   If you need to  your refill at a new pharmacy, please contact the new pharmacy directly. The new pharmacy will help you get your medications transferred faster.   Scheduling:  If you have any concerns about today's visit or wish to schedule another appointment please call our office during normal business hours [phone] (8-5:00 M-F)  If a referral was made to a [facility] and you don't get a call from central scheduling please call [phone].  If a Mammogram was ordered for you at [facility] call [phone] to schedule or change your appointment.  If you had an XRay/CT/Ultrasound/MRI ordered the number is [phone] to schedule or change your radiology appointment.   Medical Concerns:  If you have urgent medical concerns please call [phone] at any time of the day.    Wanda Candelaria, DO              Follow-ups after your visit        Additional Services     [facility], PT, HAND AND CHIROPRACTIC REFERRAL - [facility]       **This order will print in the [facility] Scheduling Office**    Physical Therapy, Hand Therapy and Chiropractic Care are available through:    *[place] [facility]  *[facility]  *[facility]    Call one number to schedule at any of the above locations: [phone].    Your provider has referred you to: Physical Therapy at [facility] or [facility]    Indication/Reason for Referral: Back pain and weakness  Onset of Illness: 1 year  Therapy Orders: Evaluate and  Treat    *Needs [place] *     Please be aware that coverage of these services is subject to the terms and limitations of your health insurance plan.  Call member services at your health plan with any benefit or coverage questions.      Please bring the following to your appointment:    *Your personal calendar for scheduling future appointments  *Comfortable clothing                  Your next 10 appointments already scheduled     Mar 20, 2018  9:40 AM CDT   [facility] Spine with Taylor Gunter PT   [place] [facility] ([facility]  )    [id number]  [place] [facility] [address] [phone]              Who to contact     Please call your clinic at [phone] to:    Ask questions about your health    Make or cancel appointments    Discuss your medicines    Learn about your test results    Speak to your doctor            Additional Information About Your Visit        [employer] Information     [employer] is an electronic gateway that provides easy, online access to your medical records. With Chat[employer]og, you can request a clinic appointment, read your test results, renew a prescription or communicate with your care team.     To sign up for [employer] visit the website at [email or web address]   You will be asked to enter the access code listed below, as well as some personal information. Please follow the directions to create your username and password.     Your access code is: [email or web address]  Expires: 2018  1:56 PM     Your access code will  in 90 days. If you need help or a new code, please contact your [facility] or call [phone] for assistance.        Care EveryWhere ID     This is your Care EveryWhere ID. This could be used by other organizations to access your [place] medical records  [id number]        Your Vitals Were     Pulse Temperature Respirations Pulse Oximetry BMI (Body Mass Index)       83 98.6  F (37  C)  (Oral) 16 98% 30.86 kg/m2        Blood Pressure from Last 3 Encounters:   03/09/18 116/83   03/02/18 130/86   03/01/18 126/88    Weight from Last 3 Encounters:   03/09/18 166 lb (75.3 kg)   03/01/18 168 lb (76.2 kg)   02/28/18 163 lb 9.6 oz (74.2 kg)              We Performed the Following     [facility], PT, HAND AND CHIROPRACTIC REFERRAL - [facility]     OSTEOPATHIC MANIP,9-10 BODY REGN          Where to get your medicines      These medications were sent to [facility] [place], [place] - [address], [facility] [address]     Phone:  [phone]     norethindrone 5 MG tablet          Primary Care Provider Office Phone # Fax #    Baylee Garrido [phone]       [facility] [address]   [facility] [address]        Equal Access to Services     ESPERANZA PEÑA : Hadii aad ku hadasho Sojessica, waaxda luqadaha, qaybta kaalmada adeegyada, waxay yariin darius simons . So [facility] [phone].    ATENCIÓN: Si habla español, tiene a gordillo disposición servicios gratuitos de asistencia lingüística. Llame al [phone].    We comply with applicable federal civil rights laws and [place] laws. We do not discriminate on the basis of race, color, national origin, age, disability, sex, sexual orientation, or gender identity.            Thank you!     Thank you for choosing [facility]  for your care. Our goal is always to provide you with excellent care. Hearing back from our patients is one way we can continue to improve our services. Please take a few minutes to complete the written survey that you may receive in the mail after your visit with us. Thank you!             Your Updated Medication List - Protect others around you: Learn how to safely use, store and throw away your medicines at www.disposemymeds.org.          This list is accurate as of 3/9/18 11:30 AM.  Always use your most recent med list.                   Brand Name  Dispense Instructions for use Diagnosis    acetaminophen 500 MG tablet    TYLENOL    100 tablet    Take 1-2 tablets (500-1,000 mg) by mouth every 6 hours as needed for mild pain    Episodic tension-type headache, not intractable       diclofenac 1 % Gel topical gel    VOLTAREN    100 g    Apply 4 grams to knees or 2 grams to hands four times daily using enclosed dosing card.    Acute left-sided low back pain without sciatica       ibuprofen 200 MG tablet    ADVIL/MOTRIN    100 tablet    Take 1-3 tablets (200-600 mg) by mouth every 6 hours as needed for moderate pain or fever    Episodic tension-type headache, not intractable       norethindrone 5 MG tablet    AYGESTIN    84 tablet    Take 1 tablet (5 mg) by mouth daily    Encounter for initial prescription of contraceptive pills

## 2018-03-09 NOTE — PATIENT INSTRUCTIONS
Here is the plan from today's visit    1. Encounter for refill prescription of contraceptive pills  - norethindrone (AYGESTIN) 5 MG tablet; Take 1 tablet (5 mg) by mouth daily  Dispense: 84 tablet; Refill: 3    2. Somatic dysfunction of head region  3. Nonallopathic lesion of cervical region  4. Nonallopathic lesion of thoracic region  5. Nonallopathic lesion of lumbar region  6. Nonallopathic lesion of sacral region  7. Somatic dysfunction of pelvic region  8. Nonallopathic lesion of lower extremities  9. Nonallopathic lesion of upper extremities  10. Nonallopathic lesion of rib cage  - OSTEOPATHIC MANIP,9-10 BODY REGION  - Drink water  - Take Tylenol or Ibuprofen as indicated for pain  - GO to PT  - [facility], PT, HAND AND CHIROPRACTIC REFERRAL - [facility]  - Try to sit and stand and walk straight.      Please call or return to clinic if your symptoms don't go away.    Follow up plan  Please make a clinic appointment for follow up with me (JAMES CANDELARIA) as needed for OMT.    Thank you for coming to [facility] today!  Farheen Candelaria,      \[facility]\"" Family Medicine  [phone]  [facility]    Lab Testing:  **If you had lab testing today and your results are reassuring or normal they will be mailed to you or sent through [employer] within 7 days.   **If the lab tests need quick action we will call you with the results.  The phone number we will call with results is # [phone] (home) . If this is not the best number please call our clinic and change the number.  Medication Refills:  If you need any refills please call your pharmacy and they will contact us.   If you need to  your refill at a new pharmacy, please contact the new pharmacy directly. The new pharmacy will help you get your medications transferred faster.   Scheduling:  If you have any concerns about today's visit or wish to schedule another appointment please call our office during normal business hours [phone]  (8-5:00 M-F)  If a referral was made to a [facility] and you don't get a call from central scheduling please call [phone].  If a Mammogram was ordered for you at [facility] call [phone] to schedule or change your appointment.  If you had an XRay/CT/Ultrasound/MRI ordered the number is [phone] to schedule or change your radiology appointment.   Medical Concerns:  If you have urgent medical concerns please call [phone] at any time of the day.    Wanda Candelaria,

## 2018-03-09 NOTE — PROGRESS NOTES
Preceptor Attestation:   Patient seen and discussed with the resident. I observed the manual therapy.    Assessment and plan reviewed with resident and agreed upon.   Supervising Physician:  Gael Neely MD  [facility]

## 2018-03-09 NOTE — PROGRESS NOTES
[place] [facility] Initial Evaluation  Subjective:  Patient is a 33 year old female presenting with rehab left ankle/foot hpi.                                    General health as reported by patient is excellent.  Pertinent medical history includes:  Other (headache).    Other surgeries include:  Other (c section).  Current medications:  Pain medication and other (birth control, ointment).      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    Barriers include:  None as reported by patient.    Red flags:  None as reported by patient.      Oswestry Score: 0 %                 Objective:  System    Physical Exam    General     ROS    Assessment/Plan:

## 2018-03-23 ENCOUNTER — OFFICE VISIT (OUTPATIENT)
Dept: FAMILY MEDICINE | Facility: CLINIC | Age: 33
End: 2018-03-23
Payer: MEDICAID

## 2018-03-23 VITALS
DIASTOLIC BLOOD PRESSURE: 86 MMHG | HEART RATE: 93 BPM | OXYGEN SATURATION: 99 % | BODY MASS INDEX: 30.97 KG/M2 | TEMPERATURE: 97.9 F | SYSTOLIC BLOOD PRESSURE: 127 MMHG | WEIGHT: 166.6 LBS

## 2018-03-23 DIAGNOSIS — M99.9 NONALLOPATHIC LESION OF SACRAL REGION: ICD-10-CM

## 2018-03-23 DIAGNOSIS — M99.9 NONALLOPATHIC LESION OF RIB CAGE: ICD-10-CM

## 2018-03-23 DIAGNOSIS — L81.1: ICD-10-CM

## 2018-03-23 DIAGNOSIS — M99.9 NONALLOPATHIC LESION OF UPPER EXTREMITIES: ICD-10-CM

## 2018-03-23 DIAGNOSIS — Z30.011 ENCOUNTER FOR INITIAL PRESCRIPTION OF CONTRACEPTIVE PILLS: Primary | ICD-10-CM

## 2018-03-23 DIAGNOSIS — M99.00 SOMATIC DYSFUNCTION OF HEAD REGION: ICD-10-CM

## 2018-03-23 DIAGNOSIS — M99.9 NONALLOPATHIC LESION OF LUMBAR REGION: ICD-10-CM

## 2018-03-23 DIAGNOSIS — M99.9 NONALLOPATHIC LESION OF CERVICAL REGION: ICD-10-CM

## 2018-03-23 DIAGNOSIS — M99.9 NONALLOPATHIC LESION OF THORACIC REGION: ICD-10-CM

## 2018-03-23 DIAGNOSIS — M99.05 SOMATIC DYSFUNCTION OF PELVIC REGION: ICD-10-CM

## 2018-03-23 DIAGNOSIS — M99.9 NONALLOPATHIC LESION OF LOWER EXTREMITIES: ICD-10-CM

## 2018-03-23 DIAGNOSIS — O99.719: ICD-10-CM

## 2018-03-23 RX ORDER — DROSPIRENONE AND ETHINYL ESTRADIOL 0.02-3(28)
1 KIT ORAL DAILY
Qty: 84 TABLET | Refills: 1 | Status: SHIPPED | OUTPATIENT
Start: 2018-03-23 | End: 2018-04-06

## 2018-03-23 RX ORDER — TRETINOIN 0.25 MG/G
CREAM TOPICAL
Qty: 45 G | Refills: 0 | Status: SHIPPED | OUTPATIENT
Start: 2018-03-23 | End: 2018-05-02

## 2018-03-23 ASSESSMENT — ENCOUNTER SYMPTOMS
SHORTNESS OF BREATH: 0
HEADACHES: 1
PALPITATIONS: 0
SPEECH DIFFICULTY: 0
WEAKNESS: 1
ABDOMINAL PAIN: 0
DIFFICULTY URINATING: 0
COUGH: 0
FEVER: 0
NECK PAIN: 1
DYSPHORIC MOOD: 0
VOMITING: 0
EYES NEGATIVE: 1
SEIZURES: 0
DECREASED CONCENTRATION: 1
DIZZINESS: 0
FATIGUE: 1
NECK STIFFNESS: 0
UNEXPECTED WEIGHT CHANGE: 0
FACIAL ASYMMETRY: 0
MYALGIAS: 1
SLEEP DISTURBANCE: 1
ACTIVITY CHANGE: 1
CONSTIPATION: 0
DIARRHEA: 0
BACK PAIN: 1
CHEST TIGHTNESS: 0
WHEEZING: 0
JOINT SWELLING: 0
NAUSEA: 0
ARTHRALGIAS: 1
NERVOUS/ANXIOUS: 1
NUMBNESS: 1
APPETITE CHANGE: 0
CHOKING: 0

## 2018-03-23 NOTE — PATIENT INSTRUCTIONS
Here is the plan from today's visit    1. Encounter for initial prescription of contraceptive pills  - drospirenone-ethinyl estradiol (CORRINE) 3-0.02 MG per tablet; Take 1 tablet by mouth daily  Dispense: 84 tablet; Refill: 1    2. Melasma gravidarum, postpartum  - tretinoin (RETIN-A) 0.025 % cream; Spread a pea size amount into affected area topically at bedtime.  Use sunscreen SPF>20.  Dispense: 45 g; Refill: 0    3. Somatic dysfunction of head region  4. Nonallopathic lesion of cervical region  5. Nonallopathic lesion of thoracic region  6. Nonallopathic lesion of lumbar region  7. Nonallopathic lesion of sacral region  8. Somatic dysfunction of pelvic region  9. Nonallopathic lesion of lower extremities  10. Nonallopathic lesion of upper extremities  11. Nonallopathic lesion of rib cage    - OSTEOPATHIC MANIP,9-10 BODY REGN    Please call or return to clinic if your symptoms don't go away.    Follow up plan  Please make a clinic appointment for follow up with me (JAMES NANCE) in 2-4 weeks.    Thank you for coming to [facility] today.  Lab Testing:  **If you had lab testing today and your results are reassuring or normal they will be mailed to you or sent through [employer] within 7 days.   **If the lab tests need quick action we will call you with the results.  The phone number we will call with results is # [phone] (home) . If this is not the best number please call our clinic and change the number.  Medication Refills:  If you need any refills please call your pharmacy and they will contact us.   If you need to  your refill at a new pharmacy, please contact the new pharmacy directly. The new pharmacy will help you get your medications transferred faster.   Scheduling:  If you have any concerns about today's visit or wish to schedule another appointment please call our office during normal business hours [phone] (8-5:00 M-F)  If a referral was made to a [facility] and  you don't get a call from central scheduling please call [phone].  If a Mammogram was ordered for you at [facility] call [phone] to schedule or change your appointment.  If you had an XRay/CT/Ultrasound/MRI ordered the number is [phone] to schedule or change your radiology appointment.   Medical Concerns:  If you have urgent medical concerns please call [phone] at any time of the day.    Wanda Candelaria, DO

## 2018-03-23 NOTE — PROGRESS NOTES
HPI:       Tonia Smith is a 33 year old who presents for a refill of birth control pills and OMT  Contraception start -   Method interested in: oral contraceptives              Methods used previously: Progesterone-only pill:   OCP counseling given. The use of the oral contraceptive has been fully discussed with the patient including possible side effects, the proper method to initiate (i.e.  start or as as directed) the pills, the need for back up contraception during the first cycle of pills, the need for regular compliance to ensure adequate contraceptive effect, the instructions for what to do in event of 1 missed pill or greater than 3 hour variation in time of taking, warnings about the risks of STDs if a condom is not used and the risks of smoking while on the pill.  Problems with previous methods: YES-- Unplanned Pregnancy.    History of pregnancies:         LMP 18.           Lab Results   Component Value Date    PAP NIL 10/11/2016     : 2  Para: 2  Menstrual cycle: regular  Flow: medium, associated with pain and HA  History of migraines: no  Smoker: no  1st degree relative with History of: none.    Accompanying Signs & Symptoms:   Dysuria: no  Vaginal discharge: no  Painful intercourse: no    Precipitating and/or Alleviating factors:    Currently sexually active: YES.  In stable relationship: no  Desire STD testing: no  Are you planning a pregnancy soon: no    An [employer]  was used for  this visit.      Adherence and Exercise  Medication side effects: no  How often is a medication missed? Never  Exercise: back exercises from clinic 2-3 days/week for an average of 15-30 minutes   Problem, Medication and Allergy Lists were reviewed and are current.  Patient is an established patient of this clinic and I reviewed past Medical History and social Hx.         Review of Systems:   Review of Systems   Constitutional: Positive for activity change (doing exercises daily-3x week as  prescribed). Negative for appetite change, fever and unexpected weight change.   HENT: Negative.    Eyes: Negative.    Respiratory: Negative for cough, shortness of breath and wheezing.    Cardiovascular: Negative for chest pain, palpitations and leg swelling.   Gastrointestinal: Negative for abdominal pain, constipation, diarrhea, nausea and vomiting.   Genitourinary: Negative for dyspareunia, dysuria, frequency, menstrual problem, pelvic pain, urgency, vaginal discharge and vaginal pain.   Musculoskeletal: Positive for arthralgias, back pain, gait problem, myalgias and neck pain. Negative for joint swelling and neck stiffness.   Skin: Negative.    Neurological: Positive for headaches (better: had one HA in past 2 weeks.). Negative for tremors, seizures, syncope, speech difficulty, weakness, light-headedness and numbness.   Psychiatric/Behavioral: Negative for self-injury, sleep disturbance and suicidal ideas. The patient is nervous/anxious (chronic).    All other systems reviewed and are negative.            Physical Exam:   /83  Pulse 83  Temp 98.6  F (37  C) (Oral)  Resp 16  Wt 166 lb (75.3 kg)  SpO2 98%  BMI 30.86 kg/m2    Body mass index is 30.86 kg/(m^2).  Vitals were reviewed and were normal     Physical Exam   General: Alert and oriented, in no acute distress.  Skin: Warm and dry, no abnormalities noted.  Eyes: Extra-ocular muscles intact, pupils equal and reactive.  ENT: Speech intact, nasal passages open, no hearing impairment noted  Neck: Supple, no swelling noted  Respiratory: No respiratory difficulty noted.  Neuro: Gait and station normal with postural reminders, comprehension intact. Gross and fine motor skills intact.   Psychiatric: Mood and affect appear normal.   Extremities: Warm  See Osteopathic Exam below in OMT Procedure Note     Results:   None.  Assessment and Plan   1. Encounter for refill prescription of contraceptive pills  - norethindrone (AYGESTIN) 5 MG tablet; Take 1 tablet  "(5 mg) by mouth daily  Dispense: 84 tablet; Refill: 3  - Has used this pill in the past and would like to try using it again.  - Counseled about the additional risk of pregnancy with a POP.  - Will take at the same hour every day as directed.    2. Somatic dysfunction of head region  3. Nonallopathic lesion of cervical region  4. Nonallopathic lesion of thoracic region  5. Nonallopathic lesion of lumbar region  6. Nonallopathic lesion of sacral region  7. Somatic dysfunction of pelvic region  8. Nonallopathic lesion of lower extremities  9. Nonallopathic lesion of upper extremities  10. Nonallopathic lesion of rib cage  - OSTEOPATHIC MANIP,9-10 BODY REGION.  - Drink water.  - Take Tylenol or Ibuprofen as indicated for pain.  - GO to PT!  - [facility] - [facility].  - Try to sit and stand and walk straight: remind yourself to \"become straight\" every time you think of pain.  - Continue to do home core exercises at least 3 times a week, but every day if you can.     Medications Discontinued During This Encounter   Medication Reason     norethindrone (AYGESTIN) 5 MG tablet Reorder     Options for treatment and follow-up care were reviewed with the patient. Tonia Smith engaged in the decision making process and verbalized understanding of the options discussed and agreed with the final plan.    DO [facility]  [phone]  [facility]       OMT PROCEDURE NOTE     Body Region: Head, Face, and Jaw  Somatic Dysfunction: left TMJ (C-shaped), left anteriorly rotated temporal bone. OA rotated right. Frontalis and occipital mms all TTP.  Treatment: Direct and Indirect Myofascial Release, Direct and Indirect Muscle Energy, HVLA, Facilitated Positional Release and Cranial Techniques  Outcome: Improved     Body Region: C-spine and Neck  Somatic Dysfunction: C3 and 4 RrSl. Hypertonic paraspinal mms b/l. Rigid right SCM.  Treatment: Direct " Myofascial Release, Direct and Indirect Muscle Energy, and Articulatory Techniques  Outcome: Resolved     Body Region: T-spine, Ribs, and Shoulder   Somatic Dysfunction: T3-6 RrSl. Hypertonic Right levator scapula, paraspinal mms, and Trapezius.   Treatment: Direct Myofascial Release, Direct and Indirect Muscle Energy, HVLA, Facilitated Positional Release, Lymphatic (with Thoracic Duct Opening and Closing) Techniques   Outcome: Improved     Body Region: Chest and Abdomen  Somatic Dysfunction: Tight pectoralis minor mms.  Treatment: Direct Myofascial Release and Facilitated Positional Release.  Outcome: Resolved     Body Region: Shoulder, UE, and Hand  Somatic Dysfunction: Fascial restriction with TTP of UE and hand.    Treatment: Direct and Indirect Myofascial Release, and Soft Tissue Techniques.  Outcome: Improved     Body Region: L-spine, Sacrum, Pelvis, and Innominates  Somatic Dysfunction: Generalized weakness and tenderness of all core and paraspinal mms, L1-5 RrSl and held in extension. Sacrum in b/l flexion. Left pelvic rami superior. Left innominate held in posterior rotation. B/L gluteal muscles hypertonic and ropy, L>R  Treatment: Direct and Indirect Myofascial Release, Direct Muscle Energy, HVLA, Articulatory and Still's Techniques   Outcome: Improved     Body Region: LE and Feet  Somatic Dysfunction: Left leg shorter than right. B/l hamstrings weak and tender to palpation.   Treatment: Direct and Indirect Myofascial Release, Direct and Indirect Muscle Energy, and HVLA Techniques   Outcome: Improved        The patient actively participated in OMT and was able to communicate both positive and negative feedback throughout. Patient tolerated treatment well, and OMT completed without incident. Patient reported that ROM, function, and/or pain level were improved. Advised that pain is occasionally worse during the first 24 hours after treatment and that drinking more water and taking Tylenol or Ibuprofen often  help. Patient to return in 1-2 week/s or as needed for repeat OMT.      OMT procedure performed by Farheen Candelaria DO.   Dr. Neely was present for comer portions of procedure.

## 2018-03-23 NOTE — PROGRESS NOTES
Preceptor Attestation:   Patient seen and discussed with the resident.   I was present for the manual therapy treatments.     Assessment and plan reviewed with resident and agreed upon.   Supervising Physician:  Gael Neely MD  [facility]

## 2018-03-23 NOTE — MR AVS SNAPSHOT
After Visit Summary   3/23/2018    Tonia Smith    MRN: [id number]           Patient Information     Date Of Birth          1985        Visit Information        Provider Department      3/23/2018 1:00 PM Wanda Candelaria DO [facility]        Today's Diagnoses     Encounter for initial prescription of contraceptive pills    -  1    Melasma gravidarum, antepartum        Somatic dysfunction of head region        Nonallopathic lesion of cervical region        Nonallopathic lesion of thoracic region        Nonallopathic lesion of lumbar region        Nonallopathic lesion of sacral region        Somatic dysfunction of pelvic region        Nonallopathic lesion of lower extremities        Nonallopathic lesion of upper extremities        Nonallopathic lesion of rib cage          Care Instructions    Here is the plan from today's visit    1. Encounter for initial prescription of contraceptive pills  - drospirenone-ethinyl estradiol (CORRINE) 3-0.02 MG per tablet; Take 1 tablet by mouth daily  Dispense: 84 tablet; Refill: 1    2. Melasma gravidarum, postpartum  - tretinoin (RETIN-A) 0.025 % cream; Spread a pea size amount into affected area topically at bedtime.  Use sunscreen SPF>20.  Dispense: 45 g; Refill: 0    3. Somatic dysfunction of head region  4. Nonallopathic lesion of cervical region  5. Nonallopathic lesion of thoracic region  6. Nonallopathic lesion of lumbar region  7. Nonallopathic lesion of sacral region  8. Somatic dysfunction of pelvic region  9. Nonallopathic lesion of lower extremities  10. Nonallopathic lesion of upper extremities  11. Nonallopathic lesion of rib cage    - OSTEOPATHIC MANIP,9-10 BODY REGN    Please call or return to clinic if your symptoms don't go away.    Follow up plan  Please make a clinic appointment for follow up with me (WANDA CANDELARIA) in 2-4 weeks.    Thank you for coming to [facility] today.  Lab Testing:  **If you had lab testing today  and your results are reassuring or normal they will be mailed to you or sent through [employer] within 7 days.   **If the lab tests need quick action we will call you with the results.  The phone number we will call with results is # [phone] (home) . If this is not the best number please call our clinic and change the number.  Medication Refills:  If you need any refills please call your pharmacy and they will contact us.   If you need to  your refill at a new pharmacy, please contact the new pharmacy directly. The new pharmacy will help you get your medications transferred faster.   Scheduling:  If you have any concerns about today's visit or wish to schedule another appointment please call our office during normal business hours [phone] (8-5:00 M-F)  If a referral was made to a [facility] and you don't get a call from central scheduling please call [phone].  If a Mammogram was ordered for you at [facility] call [phone] to schedule or change your appointment.  If you had an XRay/CT/Ultrasound/MRI ordered the number is [phone] to schedule or change your radiology appointment.   Medical Concerns:  If you have urgent medical concerns please call [phone] at any time of the day.    Wanda Candelaria, DO            Follow-ups after your visit        Who to contact     Please call your clinic at [phone] to:    Ask questions about your health    Make or cancel appointments    Discuss your medicines    Learn about your test results    Speak to your doctor            Additional Information About Your Visit        [employer] Information     [employer] is an electronic gateway that provides easy, online access to your medical records. With [employer], you can request a clinic appointment, read your test results, renew a prescription or communicate with your care team.     To sign up for [employer] visit the website at [email or web address]   You will be asked to  enter the access code listed below, as well as some personal information. Please follow the directions to create your username and password.     Your access code is: [email or web address]  Expires: 2018  2:56 PM     Your access code will  in 90 days. If you need help or a new code, please contact your [facility] or call [phone] for assistance.        Care EveryWhere ID     This is your Care EveryWhere ID. This could be used by other organizations to access your [place] medical records  [id number]        Your Vitals Were     Pulse Temperature Pulse Oximetry Breastfeeding? BMI (Body Mass Index)       93 97.9  F (36.6  C) (Oral) 99% No 30.97 kg/m2        Blood Pressure from Last 3 Encounters:   18 127/86   18 116/83   18 130/86    Weight from Last 3 Encounters:   18 166 lb 9.6 oz (75.6 kg)   18 166 lb (75.3 kg)   18 168 lb (76.2 kg)              We Performed the Following     OSTEOPATHIC MANIP,9-10 BODY REGN          Today's Medication Changes          These changes are accurate as of 3/23/18  2:04 PM.  If you have any questions, ask your nurse or doctor.               Start taking these medicines.        Dose/Directions    drospirenone-ethinyl estradiol 3-0.02 MG per tablet   Commonly known as:  CORRINE   Used for:  Encounter for initial prescription of contraceptive pills   Started by:  Wanda Candelaria DO        Dose:  1 tablet   Take 1 tablet by mouth daily   Quantity:  84 tablet   Refills:  1       tretinoin 0.025 % cream   Commonly known as:  RETIN-A   Used for:  Melasma gravidarum, antepartum   Started by:  Wanda Candelaria DO        Spread a pea size amount into affected area topically at bedtime.  Use sunscreen SPF>20.   Quantity:  45 g   Refills:  0         Stop taking these medicines if you haven't already. Please contact your care team if you have questions.     norethindrone 5 MG tablet   Commonly known as:  AYGESTIN   Stopped by:   Wanda Candelaria, DO                Where to get your medicines      These medications were sent to [facility] - [place], [place] - [address], [facility] [address]     Phone:  [phone]     drospirenone-ethinyl estradiol 3-0.02 MG per tablet    tretinoin 0.025 % cream                Primary Care Provider Office Phone # Fax #    Baylee MD Radhika [phone]       [facility] [address]   [facility] [address]        Equal Access to Services     [facility]: Hadii aad ku hadasho Soomaali, waaxda luqadaha, qaybta kaalmada adelennieyayesica, britney simons . So [facility] [phone].    ATENCIÓN: Si habla español, tiene a gordillo disposición servicios gratuitos de asistencia lingüística. [facility] [phone].    We comply with applicable federal civil rights laws and [place] laws. We do not discriminate on the basis of race, color, national origin, age, disability, sex, sexual orientation, or gender identity.            Thank you!     Thank you for choosing [facility]  for your care. Our goal is always to provide you with excellent care. Hearing back from our patients is one way we can continue to improve our services. Please take a few minutes to complete the written survey that you may receive in the mail after your visit with us. Thank you!             Your Updated Medication List - Protect others around you: Learn how to safely use, store and throw away your medicines at www.disposemymeds.org.          This list is accurate as of 3/23/18  2:04 PM.  Always use your most recent med list.                   Brand Name Dispense Instructions for use Diagnosis    acetaminophen 500 MG tablet    TYLENOL    100 tablet    Take 1-2 tablets (500-1,000 mg) by mouth every 6 hours as needed for mild pain    Episodic tension-type headache, not intractable       diclofenac 1 % Gel topical gel    VOLTAREN    100 g    Apply  4 grams to knees or 2 grams to hands four times daily using enclosed dosing card.    Acute left-sided low back pain without sciatica       drospirenone-ethinyl estradiol 3-0.02 MG per tablet    CORRINE    84 tablet    Take 1 tablet by mouth daily    Encounter for initial prescription of contraceptive pills       ibuprofen 200 MG tablet    ADVIL/MOTRIN    100 tablet    Take 1-3 tablets (200-600 mg) by mouth every 6 hours as needed for moderate pain or fever    Episodic tension-type headache, not intractable       tretinoin 0.025 % cream    RETIN-A    45 g    Spread a pea size amount into affected area topically at bedtime.  Use sunscreen SPF>20.    Melasma gravidarum, antepartum

## 2018-03-23 NOTE — PROGRESS NOTES
HPI:       Tonia Smith is a 33 year old who presents with pain and weakness that encompasses the entire left side of her body. She was seen in clinic by Dr. DELORIS Garrido 2 days ago and referred to me: during the intervening day she found the back pain (with associated headache) to be severe enough that she went to the emergency department where she received a full stroke workup (Negative Head and Neck CT and CTAngio, stable vitals, very low suspicion).  She reports that she is unable to sit up straight, unable to stand straight when walking. She is able to carry her infant and toddler at the same time, and she has no preference for one side or the other for carrying.   She reports that her headache is better now, but that she does not have any medication for common pain at home and her headaches come back every 2-4 weeks.  The dramatic spine-left-sided-slump that she presented with waxed and waned throughout our interview. She is able to straighten up with prompting.   She has an infant and a toddler at home. She does not appear to have very much help at home.    Back Pain      Duration: months        Specific cause: none: just weakness    Description:   Location of pain: low back left, neck left, shoulders left, gluteus left and waist left  Character of pain: dull ache and weakness, sometimes sharp pains.  Pain radiation:radiates into the left buttocks and radiates into the left leg: less pain, more weakness.  New numbness or weakness in legs, not attributed to pain:  No   Any new bowel or bladder incontinence?No     Intensity: Currently severe    History:   Pain interferes with job/home/school:  YES small children at home, hard to care for them.  History of back problems: no prior back problems  Any previous MRI or X-rays: None  Sees a specialist for back pain:  No  Therapies tried without relief: acetaminophen (Tylenol), NSAIDs, activity, cold, heat, massage, rest, sitting, standing, and  stretch.    Alleviating factors:   Improved by: nothing      Precipitating factors:  Worsened by: Lifting, Standing, Stretching, and Walking    Accompanying Signs & Symptoms:  Risk of Fracture: No   Risk of Cauda Equina:No   Risk of Infection:  No   Risk of Cancer:  No     An [employer]  was used for  this visit.      Problem, Medication and Allergy Lists were reviewed and are current.  Patient is an established patient of this clinic and I reviewed past Medical and Surgical Hx.         Review of Systems:   Review of Systems   Constitutional: Positive for activity change and fatigue. Negative for appetite change, fever and unexpected weight change.   HENT: Negative.    Eyes: Negative.    Respiratory: Negative for cough, choking, chest tightness, shortness of breath and wheezing.    Cardiovascular: Negative for chest pain, palpitations and leg swelling.   Gastrointestinal: Negative for abdominal pain, constipation, diarrhea, nausea and vomiting.   Genitourinary: Positive for pelvic pain (anterior pelvis, groin muscles). Negative for difficulty urinating and menstrual problem.   Musculoskeletal: Positive for arthralgias, back pain, gait problem, myalgias and neck pain. Negative for joint swelling and neck stiffness.   Skin: Negative.    Neurological: Positive for weakness, numbness and headaches. Negative for dizziness, seizures, syncope, facial asymmetry and speech difficulty.   Psychiatric/Behavioral: Positive for decreased concentration and sleep disturbance. Negative for dysphoric mood, self-injury and suicidal ideas. The patient is nervous/anxious.    All other systems reviewed and are negative.            Physical Exam:   /86  Pulse 85  Temp 98.2  F (36.8  C) (Oral)  SpO2 99%  Breastfeeding? No    There is no height or weight on file to calculate BMI.  Vitals were reviewed and were normal: slight elevation of BP 2/2 pain and anxiety.     Physical Exam   Constitutional: She is oriented to person,  place, and time. She appears well-developed and well-nourished. No distress.   HENT:   Head: Normocephalic and atraumatic.   Eyes: Conjunctivae and EOM are normal. Pupils are equal, round, and reactive to light.   Neck: Normal range of motion. Neck supple. No tracheal deviation present. No thyromegaly present.   Cardiovascular: Normal rate, regular rhythm, normal heart sounds and intact distal pulses.    No murmur heard.  Pulmonary/Chest: Effort normal and breath sounds normal. No respiratory distress. She has no wheezes.   Abdominal: Soft. Bowel sounds are normal. She exhibits no distension and no mass. There is no tenderness. There is no rebound and no guarding.   Musculoskeletal: Normal range of motion. She exhibits tenderness (left side). She exhibits no edema or deformity.   Lymphadenopathy:     She has no cervical adenopathy.   Neurological: She is alert and oriented to person, place, and time. She has normal reflexes. No cranial nerve deficit or sensory deficit. She exhibits normal muscle tone. Coordination normal. Abnormal gait: gait waxes and wanes. She displays no Babinski's sign on the right side. She displays no Babinski's sign on the left side.   Reflex Scores:       Tricep reflexes are 2+ on the right side and 2+ on the left side.       Bicep reflexes are 2+ on the right side and 2+ on the left side.       Brachioradialis reflexes are 2+ on the right side and 2+ on the left side.       Patellar reflexes are 2+ on the right side and 2+ on the left side.       Achilles reflexes are 2+ on the right side and 2+ on the left side.  During muscle testing there was inconsistency of effort with left extremities. During OMT all muscle strength was 5/5.    Skin: Skin is warm and dry. She is not diaphoretic.   Psychiatric: Her speech is normal. Her mood appears anxious. Cognition and memory are normal. She expresses impulsivity.   Vitals reviewed.    See OMT note for Osteopathic Exam.    Results:    None.  Assessment and Plan   Tonia Smith is a 33 year old who presents with pain and weakness that encompasses the entire left side of her body. She received a negative stroke workup in the ED yesterday.  She reports that she is unable to sit up straight, unable to stand straight when walking. The dramatic spine-left-sided-slump that she presented with waxed and waned throughout our interview. She is able to straighten up with prompting.   She reports that her headache is better now, but that she does not have any medication for common pain at home and her headaches come back every 2-4 weeks.  She has an infant and a toddler at home. She does not appear to have very much help at home. Some of her low back/ pelvis pain appears to be linked to her recent  childbirth: I will attempt to readjust these structural changes gradually to restore function and promote long-term stability.   She appears to be an excellent candidate for OMT and hopefully she will be willing/able to complete home exercises to assist with core strengthening. If OMT is considered helpful, I will plan to see her 2 times per month until her strength and pain are improved.     1. Somatic dysfunction of head region  2. Nonallopathic lesion of cervical region  3. Nonallopathic lesion of thoracic region  4. Nonallopathic lesion of lumbar region  5. Nonallopathic lesion of sacral region  6. Somatic dysfunction of pelvic region  7. Nonallopathic lesion of lower extremities  8. Nonallopathic lesion of upper extremities  9. Nonallopathic lesion of rib cage  - OSTEOPATHIC MANIPULATION,9-10 BODY REGION  - Drink water, at least 1 liter per day.  - Use Ibuprofen as directed as needed.  - Do back exercises 3x week to every day. Start slow, but continue to try to do all exercises. Increase reps weekly.    10. Episodic tension-type headache, not intractable  - ibuprofen (ADVIL/MOTRIN) 200 MG tablet; Take 1-3 tablets (200-600 mg) by mouth every 6 hours as  needed for moderate pain or fever  Dispense: 100 tablet; Refill: 6    Medications Discontinued During This Encounter   Medication Reason     naproxen (NAPROSYN) 500 MG tablet Side effects     loratadine (CLARITIN) 10 MG tablet Stopped by Patient     meclizine (ANTIVERT) 25 MG tablet Stopped by Patient     ibuprofen (ADVIL/MOTRIN) 600 MG tablet Reorder     Options for treatment and follow-up care were reviewed with the patient. Tonia Smith engaged in the decision making process and verbalized understanding of the options discussed and agreed with the final plan.    DO [facility]  [phone]  [facility]    OMT PROCEDURE NOTE    Body Region: Head, Face, and Jaw  Somatic Dysfunction: left TMJ (C-shaped), left anteriorly rotated temporal bone. OA rotated right. Frontalis and occipital mms all TTP.  Treatment: Direct and Indirect Myofascial Release, Direct and Indirect Muscle Energy, HVLA, Facilitated Positional Release and Cranial Techniques  Outcome: Improved    Body Region: C-spine and Neck  Somatic Dysfunction: C3 and 4 RrSl. Hypertonic paraspinal mms b/l. Rigid right SCM.  Treatment: Direct Myofascial Release, Direct and Indirect Muscle Energy, Articulatory and Spurling's (negative) Techniques  Outcome: Resolved    Body Region: T-spine, Ribs, and Shoulder   Somatic Dysfunction: T3-5 RrSl, Rib 3 and 4 held in inhalation and posterior. Hypertonic paraspinal mms and Trapezius. Right levator scapula spasm.  Treatment: Direct Myofascial Release, Direct and Indirect Muscle Energy, HVLA, Facilitated Positional Release, Lymphatic (with Thoracic Duct Opening and Closing) Techniques   Outcome: Improved    Body Region: Chest and Abdomen  Somatic Dysfunction: Tight pectoralis minor mms, weak abdominal mms. General tenderness.  Treatment: Direct and Indirect Myofascial Release, Facilitated Positional Release and Soft Tissue Techniques   Outcome: Improved    Body  Region: Shoulder, UE, and Hand  Somatic Dysfunction: Tenderness to palpation of UE and hand. Fascial restriction and anterior radial head.   Treatment: Direct and Indirect Myofascial Release, HVLA, and Soft Tissue Techniques.  Outcome: Improved    Body Region: L-spine, Sacrum, Pelvis, and Innominates  Somatic Dysfunction: Generalized weakness and tenderness of all core and paraspinal mms, L1-5 RrSl and held in extension. Sacrum in b/l flexion. Left pelvic rami superior. Left innominate held in posterior rotation. B/L gluteal muscles hypertonic and ropy, L>R  Treatment: Direct and Indirect Myofascial Release, Direct Muscle Energy, HVLA, Articulatory and Still's Techniques   Outcome: Improved    Body Region: LE and Feet  Somatic Dysfunction: Left leg shorter than right. Left foot navicular/calcaneus rotated inferiorly/fallen. B/l hamstrings weak and tender to palpation.   Treatment: Direct and Indirect Myofascial Release, Direct and Indirect Muscle Energy and HVLA Techniques   Outcome: Improved      The patient actively participated in OMT and was able to communicate both positive and negative feedback throughout. OMT completed without incident. Patient tolerated treatment well. Patient reported that ROM, function, and/or pain level were improved. Advised that pain is occasionally worse during the first 24 hours after treatment and that drinking more water and taking Tylenol or Ibuprofen often help. Patient to return in 1-2 week/s or as needed for repeat OMT.     OMT procedure performed by Farheen Candelaria DO.   Dr. Solorzano was present for comer portions of procedure.

## 2018-03-23 NOTE — PROGRESS NOTES
"      HPI:       Tonia Smith is a 33 year old who presents for the following Tonia Smith is a 33 year old who presents for a change of birth control pills and OMT  Contraception start -   Method interested in: oral contraceptives.  We further discussed the risk of unintended pregnancy while on POP, and she agreed to try a new pill. She is currently on her period which began 3/19. She will begin the new pills tonight.           Methods used previously: Progesterone-only pill.  OCP counseling given. The use of the oral contraceptive has been fully discussed with the patient including possible side effects, the proper method to initiate (i.e.  start or as as directed) the pills, the need for back up contraception during the first cycle of pills, the need for regular compliance to ensure adequate contraceptive effect, the instructions for what to do in event of 1 missed pill or greater than 3 hour variation in time of taking, warnings about the risks of STDs if a condom is not used and the risks of smoking while on the pill.  Problems with previous methods: YES-- unintended Pregnancy with 2nd child.    History of pregnancies:         LMP:  and 3/19.           Lab Results   Component Value Date    PAP NIL 10/11/2016     : 2  Para: 2  Menstrual cycle: regular// LMP 3/19  Flow: medium, associated with cramping pain and HA  History of migraines: no  Smoker: no  1st degree relative with History of: none.    Accompanying Signs & Symptoms:   Dysuria: no  Vaginal discharge: no  Painful intercourse: no    Precipitating and/or Alleviating factors:    Currently sexually active: YES.  In stable relationship: Yes, monogamous with spouse.  Desire STD testing: no  Are you planning a pregnancy soon: no    Skin Issue: Post-partum Melasma Gravidarum  Onset: 5 years ago    Description:   Location: rings around eyes of darker skin  Color: medium-brown \"confluent freckles\" in rings around eyes and across " nose.  Itching (Pruritis): no  Pain?:no    Progression of Symptoms:  same    Accompanying Signs & Symptoms:  Fever: no  Body aches or joint pain:  no  Sore throat symptoms:no  Recent cold symptoms: no    History:   Previous similar rash: Yes Details: previous pregnancy    Precipitating factors:   Exposure to similar rash: no  New exposures: no  Recent travel: no  New Medication: no    What makes it better? It regressed after the first child, but the 2nd child is 1.5 years old, and it is not going.  Therapies Tried and outcome:  OTC Skin-lightening cream, Details: worked a little, but afraid to keep using on delicate face skin.    An [employer]  was used for  this visit.      Adherence and Exercise  Medication side effects: no  How often is a medication missed? Never  Exercise: back exercises from clinic 2-3 days/week for an average of 15-30 minutes. Uses the back/core exercises from the clinic.    Problem, Medication and Allergy Lists were reviewed and are current.  Patient is an established patient of this clinic and I reviewed past Medical History and social Hx.       Review of Systems:   Review of Systems   Constitutional: Negative for activity change, appetite change, fever and unexpected weight change.   HENT: Negative.    Eyes: Negative.    Respiratory: Negative for cough, chest tightness, shortness of breath and wheezing.    Cardiovascular: Negative for chest pain, palpitations and leg swelling.   Gastrointestinal: Negative for abdominal pain, constipation and diarrhea.   Genitourinary: Negative.    Musculoskeletal: Positive for arthralgias, back pain (on left aas usual), myalgias and neck pain (on right this time). Negative for gait problem (getting better) and joint swelling.   Skin: Positive for color change (see HPI).   Neurological: Negative.    Psychiatric/Behavioral: Negative.    All other systems reviewed and are negative.            Physical Exam:   Patient Vitals for the past 24 hrs:   BP Temp  Temp src Pulse SpO2 Weight   03/23/18 1307 127/86 97.9  F (36.6  C) Oral 93 99 % 166 lb 9.6 oz (75.6 kg)     Body mass index is 30.97 kg/(m^2).  Vitals were reviewed and were normal     Physical Exam   General: Alert and oriented, in no acute distress.  Skin: Warm and dry, no abnormalities noted.  Eyes: Extra-ocular muscles intact, pupils equal and reactive.  ENT: Speech intact, nasal passages open, no hearing impairment noted  Neck: Supple, no swelling noted  Respiratory: No respiratory difficulty noted.  Neuro: Gait and station normal with reminders, comprehension intact. Gross and fine motor skills intact.   Psychiatric: Mood and affect appear normal.   Extremities: Warm    See results of Osteopathic exam below in OMT Procedure Note.    Results:   None.  Assessment and Plan   1. Encounter for initial prescription of contraceptive pills  - drospirenone-ethinyl estradiol (CORRINE) 3-0.02 MG per tablet; Take 1 tablet by mouth daily  Dispense: 84 tablet; Refill: 1    2. Melasma gravidarum, postpartum  - tretinoin (RETIN-A) 0.025 % cream; Spread a pea size amount into affected area topically at bedtime.  Use sunscreen SPF>20.  Dispense: 45 g; Refill: 0    3. Somatic dysfunctions as seen below in OMT note  - OSTEOPATHIC MANIPULATION,9-10 BODY REGION    There are no discontinued medications.  Options for treatment and follow-up care were reviewed with the patient. Tonia Smith engaged in the decision making process and verbalized understanding of the options discussed and agreed with the final plan.    DO [facility]  [phone]  [facility]       OMT PROCEDURE NOTE      Body Region: Head, Face, and Jaw  Somatic Dysfunction: OA rotated right. Frontalis and occipital mms all TTP.  Treatment: Direct Myofascial Release, Direct and Indirect Muscle Energy, and Cranial Techniques  Outcome: Improved      Body Region: C-spine and Neck  Somatic Dysfunction: C3 and 4  RrSl. Hypertonic paraspinal mms b/l. Rigid right SCM.   Treatment: Direct Myofascial Release, Direct and Indirect Muscle Energy, and Articulatory Techniques  Outcome: Resolved      Body Region: T-spine, Ribs, and Shoulder   Somatic Dysfunction: T3-6 RrSl. Hypertonic Right levator scapula, paraspinal mms, and Trapezius.   Treatment: Direct Myofascial Release, Direct and Indirect Muscle Energy, HVLA, Lymphatic (with Thoracic Duct Opening and Closing) Techniques   Outcome: Improved      Body Region: Chest and Abdomen  Somatic Dysfunction: Tight pectoralis minor mms.  Treatment: Direct Myofascial Release.  Outcome: Resolved      Body Region: Shoulder, UE, and Hand  Somatic Dysfunction: Fascial restriction with TTP of upper UE, specifically b/l Triceps.    Treatment: Direct and Indirect Myofascial Release.  Outcome: Improved      Body Region: L-spine, Sacrum, Pelvis, and Innominates  Somatic Dysfunction: Generalized weakness and tenderness of all core and paraspinal mms, L1-5 in extension. Sacrum in b/l flexion. Left pelvic rami superior. Left innominate held in posterior rotation. B/L gluteal muscles hypertonic and ropy, L>R  Treatment: Direct and Indirect Myofascial Release, Direct Muscle Energy, HVLA, and Articulatory Techniques.  Outcome: Improved      Body Region: LE and Feet  Somatic Dysfunction: Left leg shorter than right. B/l hamstrings weak and tender to palpation.   Treatment: Direct and Indirect Myofascial Release, Direct and Indirect Muscle Energy, and HVLA Techniques   Outcome: Improved          The patient actively participated in OMT and was able to communicate both positive and negative feedback throughout. Patient tolerated treatment well, and OMT completed without incident. Patient reported that ROM, function, and/or pain level were improved. Advised that pain is occasionally worse during the first 24 hours after treatment and that drinking more water and taking Tylenol or Ibuprofen often help. Patient  to return in 1-2 week/s or as needed for repeat OMT.       OMT procedure performed by Farheen Candelaria DO.   Dr. Neely was present for comer portions of procedure.

## 2018-03-24 NOTE — PROGRESS NOTES
Preceptor Attestation:   Patient seen, evaluated and discussed with the resident. I was present for key portions of the OMT procedure. I have verified the content of the note, which accurately reflects my assessment of the patient and the plan of care.   Supervising Physician:  Felix Solorzano MD

## 2018-03-25 ASSESSMENT — ENCOUNTER SYMPTOMS
JOINT SWELLING: 0
PALPITATIONS: 0
ABDOMINAL PAIN: 0
HEADACHES: 1
FEVER: 0
BACK PAIN: 1
PSYCHIATRIC NEGATIVE: 1
ACTIVITY CHANGE: 0
PALPITATIONS: 0
NERVOUS/ANXIOUS: 1
ARTHRALGIAS: 1
NECK PAIN: 1
DIARRHEA: 0
DIARRHEA: 0
UNEXPECTED WEIGHT CHANGE: 0
CHEST TIGHTNESS: 0
JOINT SWELLING: 0
EYES NEGATIVE: 1
EYES NEGATIVE: 1
APPETITE CHANGE: 0
APPETITE CHANGE: 0
VOMITING: 0
ABDOMINAL PAIN: 0
DYSURIA: 0
MYALGIAS: 1
COLOR CHANGE: 1
WEAKNESS: 0
FREQUENCY: 0
UNEXPECTED WEIGHT CHANGE: 0
NAUSEA: 0
COUGH: 0
SHORTNESS OF BREATH: 0
NECK PAIN: 1
NUMBNESS: 0
WHEEZING: 0
FEVER: 0
SHORTNESS OF BREATH: 0
SPEECH DIFFICULTY: 0
LIGHT-HEADEDNESS: 0
SLEEP DISTURBANCE: 0
WHEEZING: 0
TREMORS: 0
NECK STIFFNESS: 0
CONSTIPATION: 0
ARTHRALGIAS: 1
COUGH: 0
CONSTIPATION: 0
MYALGIAS: 1
ACTIVITY CHANGE: 1
SEIZURES: 0
BACK PAIN: 1
NEUROLOGICAL NEGATIVE: 1

## 2018-04-06 ENCOUNTER — OFFICE VISIT (OUTPATIENT)
Dept: FAMILY MEDICINE | Facility: CLINIC | Age: 33
End: 2018-04-06
Payer: COMMERCIAL

## 2018-04-06 VITALS
SYSTOLIC BLOOD PRESSURE: 134 MMHG | TEMPERATURE: 98 F | OXYGEN SATURATION: 100 % | WEIGHT: 165.8 LBS | RESPIRATION RATE: 16 BRPM | BODY MASS INDEX: 30.51 KG/M2 | HEART RATE: 88 BPM | DIASTOLIC BLOOD PRESSURE: 89 MMHG | HEIGHT: 62 IN

## 2018-04-06 DIAGNOSIS — R10.84 ABDOMINAL PAIN, GENERALIZED: Primary | ICD-10-CM

## 2018-04-06 DIAGNOSIS — Z30.41 ENCOUNTER FOR SURVEILLANCE OF CONTRACEPTIVE PILLS: ICD-10-CM

## 2018-04-06 RX ORDER — ACETAMINOPHEN AND CODEINE PHOSPHATE 120; 12 MG/5ML; MG/5ML
1 SOLUTION ORAL DAILY
Qty: 84 TABLET | Refills: 0 | Status: SHIPPED | OUTPATIENT
Start: 2018-04-06 | End: 2018-05-02

## 2018-04-06 NOTE — MR AVS SNAPSHOT
After Visit Summary   4/6/2018    Tonia Smith    MRN: [id number]           Patient Information     Date Of Birth          1985        Visit Information        Provider Department      4/6/2018 11:20 AM Radha Cordova MD [facility]        Today's Diagnoses     Abdominal pain, generalized    -  1    Encounter for surveillance of contraceptive pills          Care Instructions    Here is the plan from today's visit    1. Abdominal pain, generalized  We will change your birthcontrol pills.      2. Encounter for surveillance of contraceptive pills  Take every day at the same time. Also use condoms.   - norethindrone (MICRONOR) 0.35 MG per tablet; Take 1 tablet (0.35 mg) by mouth daily  Dispense: 84 tablet; Refill: 0      Please call or return to clinic if your symptoms don't go away.    Follow up plan  3 - 4 weeks.     Thank you for coming to [facility] today.  Lab Testing:  **If you had lab testing today and your results are reassuring or normal they will be mailed to you or sent through YOUnite within 7 days.   **If the lab tests need quick action we will call you with the results.  The phone number we will call with results is # [phone] (home) . If this is not the best number please call our clinic and change the number.  Medication Refills:  If you need any refills please call your pharmacy and they will contact us.   If you need to  your refill at a new pharmacy, please contact the new pharmacy directly. The new pharmacy will help you get your medications transferred faster.   Scheduling:  If you have any concerns about today's visit or wish to schedule another appointment please call our office during normal business hours [phone] (8-5:00 M-F)  If a referral was made to a [facility] and you don't get a call from central scheduling please call [phone].  If a Mammogram was ordered for you at [facility] call  [phone] to schedule or change your appointment.  If you had an XRay/CT/Ultrasound/MRI ordered the number is [phone] to schedule or change your radiology appointment.   Medical Concerns:  If you have urgent medical concerns please call [phone] at any time of the day.  If you have a medical emergency please call 911.          Follow-ups after your visit        Follow-up notes from your care team     Return in about 4 weeks (around 2018) for Contraception follow up .      Who to contact     Please call your clinic at [phone] to:    Ask questions about your health    Make or cancel appointments    Discuss your medicines    Learn about your test results    Speak to your doctor            Additional Information About Your Visit        [email or web address] Information     [employer] is an electronic gateway that provides easy, online access to your medical records. With [employer], you can request a clinic appointment, read your test results, renew a prescription or communicate with your care team.     To sign up for [employer] visit the website at [email or web address]   You will be asked to enter the access code listed below, as well as some personal information. Please follow the directions to create your username and password.     Your access code is: [email or web address]  Expires: 2018  2:56 PM     Your access code will  in 90 days. If you need help or a new code, please contact your [facility] or call [phone] for assistance.        Care EveryWhere ID     This is your Care EveryWhere ID. This could be used by other organizations to access your [place] medical records  [id number]        Your Vitals Were     Pulse Temperature Respirations Height Pulse Oximetry Breastfeeding?    88 98  F (36.7  C) (Oral) 16 5' 1.5\" (156.2 cm) 100% No    BMI (Body Mass Index)                   30.82 kg/m2            Blood Pressure from Last 3 Encounters:   18 134/89   18 127/86 "   03/09/18 116/83    Weight from Last 3 Encounters:   04/06/18 165 lb 12.8 oz (75.2 kg)   03/23/18 166 lb 9.6 oz (75.6 kg)   03/09/18 166 lb (75.3 kg)              Today, you had the following     No orders found for display         Today's Medication Changes          These changes are accurate as of 4/6/18 12:07 PM.  If you have any questions, ask your nurse or doctor.               Start taking these medicines.        Dose/Directions    norethindrone 0.35 MG per tablet   Commonly known as:  MICRONOR   Used for:  Encounter for surveillance of contraceptive pills   Started by:  Radha Cordova MD        Dose:  1 tablet   Take 1 tablet (0.35 mg) by mouth daily   Quantity:  84 tablet   Refills:  0         Stop taking these medicines if you haven't already. Please contact your care team if you have questions.     drospirenone-ethinyl estradiol 3-0.02 MG per tablet   Commonly known as:  CORRINE   Stopped by:  Radha Cordova MD                Where to get your medicines      These medications were sent to [facility] [place], [place] - [address]   [address] [place] [address]     Phone:  [phone]     norethindrone 0.35 MG per tablet                Primary Care Provider Office Phone # Fax #    Baylee Garrido [phone]       [facility] [address] [address]        Equal Access to Services     [facility]: Hadii nakia baezao Sojessica, waaxda luqadaha, qaybta kaalmada adeegyada, britney clifford. So [facility] [phone].    ATENCIÓN: Si habla español, tiene a gordillo disposición servicios gratuitos de asistencia lingüística. Llame al [phone].    We comply with applicable federal civil rights laws and [place] laws. We do not discriminate on the basis of race, color, national origin, age, disability, sex, sexual orientation, or gender identity.            Thank you!     [facility] you for [facility]  for your care. Our goal is always to provide you with excellent care. Hearing back from our patients is one way we can continue to improve our services. Please take a few minutes to complete the written survey that you may receive in the mail after your visit with us. Thank you!             Your Updated Medication List - Protect others around you: Learn how to safely use, store and throw away your medicines at www.disposemymeds.org.          This list is accurate as of 4/6/18 12:07 PM.  Always use your most recent med list.                   Brand Name Dispense Instructions for use Diagnosis    acetaminophen 500 MG tablet    TYLENOL    100 tablet    Take 1-2 tablets (500-1,000 mg) by mouth every 6 hours as needed for mild pain    Episodic tension-type headache, not intractable       diclofenac 1 % Gel topical gel    VOLTAREN    100 g    Apply 4 grams to knees or 2 grams to hands four times daily using enclosed dosing card.    Acute left-sided low back pain without sciatica       ibuprofen 200 MG tablet    ADVIL/MOTRIN    100 tablet    Take 1-3 tablets (200-600 mg) by mouth every 6 hours as needed for moderate pain or fever    Episodic tension-type headache, not intractable       norethindrone 0.35 MG per tablet    MICRONOR    84 tablet    Take 1 tablet (0.35 mg) by mouth daily    Encounter for surveillance of contraceptive pills       tretinoin 0.025 % cream    RETIN-A    45 g    Spread a pea size amount into affected area topically at bedtime.  Use sunscreen SPF>20.    Melasma gravidarum, antepartum

## 2018-04-06 NOTE — PROGRESS NOTES
HPI:       Tonia Smith is a 33 year old who presents for the following  Patient presents with:  Pain: Stomach pain X4days      ABDOMINAL Pain     Onset: 4 days ago    Description:   Character: Dull ache  Location: epigastric region  Radiation: None    Intensity: mild    Progression of Symptoms:  same    Accompanying Signs & Symptoms:  Fever/Chills?: No   Gas/Bloating:  YES   Dysuria:No   Hematuria: No   Nausea:  YES   Vomiting: No   Diarrhea:No   Constipation: NO       History:           Trauma: No   Previous similar pain: No    Previous tests done: none    Precipitating factors:   Does the pain change with:     Food?: YES- Unable to eat due to pain for past 3days    BM?: no     Urination:no     What makes it better?:  Nothing    Therapies Tried and outcome: Nothing    LMP:  3/10/2018     when she takes the birth control pill when she goes to sleep she can feel something moving in her.  Mid abdomen. Feels something running in her abdomen and makes her cough. Has lost her appetite over these three days.  Stomach moving also for the last 3 days.  Also feels nauseated.  No vomiting.  Worried that there is something in her stomach. Denies indigestion, constipation or diarrhea.  Is not having much flatus. Denies change in food recently.     On Patience - started it in March.  Wanting to change her OCP.  Was on a different one before. Was on norethindrone before and wondering if can take that. Notes nausea right from the start. Takes her med at night.   Also notes that she has more calf aching on the left.  No edema or swelling.     Delivered her last child - 8/2016  LMP - was having irregular periods prior.      Tried the IUD once and affected her mobility.        Problem, Medication and Allergy Lists were reviewed and are current.  Patient is an established patient of this clinic.         Review of Systems:   Review of Systems          Physical Exam:     Patient Vitals for the past 24 hrs:   BP Temp Temp src Pulse  "Resp SpO2 Height Weight   04/06/18 1128 134/89 - - - - - - -   04/06/18 1123 (!) 134/97 98  F (36.7  C) Oral 88 16 100 % 5' 1.5\" (156.2 cm) 165 lb 12.8 oz (75.2 kg)     There is no height or weight on file to calculate BMI.  Vitals were reviewed and were normal  Blood pressure 134/89, pulse 88, temperature 98  F (36.7  C), temperature source Oral, resp. rate 16, height 5' 1.5\" (156.2 cm), weight 165 lb 12.8 oz (75.2 kg), SpO2 100 %, not currently breastfeeding.       Physical Exam   Constitutional: She appears well-developed and well-nourished. No distress.   Cardiovascular: Normal rate, regular rhythm and normal heart sounds.    Pulmonary/Chest: Effort normal and breath sounds normal. No respiratory distress. She has no wheezes. She has no rales.   Abdominal: Soft. Bowel sounds are normal. She exhibits no distension. There is no tenderness.   Musculoskeletal: She exhibits no edema.   Psychiatric: She has a normal mood and affect. Thought content normal.           Results:       Assessment and Plan     Tonia was seen today for pain.    Diagnoses and all orders for this visit:    Abdominal pain, generalized - seems very related to her OCPs.  Lots of  confusion complicating history.  Is on the lowest dose estrogen pills and so not other brand to switch to. Discussed that the estrogen side effects can get better with time but she seems quite anxious and worried about her legs (nl exam). Opted to switch to progesterone only but aware that she is at higher risk for pregnancy. IUD - not willing.  We did not talk about Nexplanon or Depo but will do so when she returns. Needs to be seen again to make sure taking OK and symptoms better since history difficult.     Encounter for surveillance of contraceptive pills  -     norethindrone (MICRONOR) 0.35 MG per tablet; Take 1 tablet (0.35 mg) by mouth daily    Total time- 35 min with more than half spent counseling on contraception options.   Medications Discontinued " During This Encounter   Medication Reason     drospirenone-ethinyl estradiol (CORRINE) 3-0.02 MG per tablet      Options for treatment and follow-up care were reviewed with the patient. Tonia Smith  engaged in the decision making process and verbalized understanding of the options discussed and agreed with the final plan.      Total time - 30 min with more than half spent counseling around contraception options.   Radha Cordova MD

## 2018-04-06 NOTE — PATIENT INSTRUCTIONS
Here is the plan from today's visit    1. Abdominal pain, generalized  We will change your birthcontrol pills.      2. Encounter for surveillance of contraceptive pills  Take every day at the same time. Also use condoms.   - norethindrone (MICRONOR) 0.35 MG per tablet; Take 1 tablet (0.35 mg) by mouth daily  Dispense: 84 tablet; Refill: 0      Please call or return to clinic if your symptoms don't go away.    Follow up plan  3 - 4 weeks.     Thank you for coming to [facility] today.  Lab Testing:  **If you had lab testing today and your results are reassuring or normal they will be mailed to you or sent through Prezma within 7 days.   **If the lab tests need quick action we will call you with the results.  The phone number we will call with results is # [phone] (home) . If this is not the best number please call our clinic and change the number.  Medication Refills:  If you need any refills please call your pharmacy and they will contact us.   If you need to  your refill at a new pharmacy, please contact the new pharmacy directly. The new pharmacy will help you get your medications transferred faster.   Scheduling:  If you have any concerns about today's visit or wish to schedule another appointment please call our office during normal business hours [phone] (8-5:00 M-F)  If a referral was made to a [facility] and you don't get a call from central scheduling please call [phone].  If a Mammogram was ordered for you at [facility] call [phone] to schedule or change your appointment.  If you had an XRay/CT/Ultrasound/MRI ordered the number is [phone] to schedule or change your radiology appointment.   Medical Concerns:  If you have urgent medical concerns please call [phone] at any time of the day.  If you have a medical emergency please call [phone].

## 2018-04-14 PROBLEM — M54.42 ACUTE LEFT-SIDED LOW BACK PAIN WITH LEFT-SIDED SCIATICA: Status: RESOLVED | Noted: 2018-03-08 | Resolved: 2018-04-14

## 2018-05-02 ENCOUNTER — OFFICE VISIT (OUTPATIENT)
Dept: FAMILY MEDICINE | Facility: CLINIC | Age: 33
End: 2018-05-02
Payer: COMMERCIAL

## 2018-05-02 VITALS
SYSTOLIC BLOOD PRESSURE: 115 MMHG | OXYGEN SATURATION: 100 % | HEART RATE: 84 BPM | DIASTOLIC BLOOD PRESSURE: 77 MMHG | WEIGHT: 165 LBS | TEMPERATURE: 98.1 F | BODY MASS INDEX: 30.67 KG/M2 | RESPIRATION RATE: 16 BRPM

## 2018-05-02 DIAGNOSIS — M54.50 ACUTE LEFT-SIDED LOW BACK PAIN WITHOUT SCIATICA: ICD-10-CM

## 2018-05-02 DIAGNOSIS — K21.9 GASTROESOPHAGEAL REFLUX DISEASE WITHOUT ESOPHAGITIS: Primary | ICD-10-CM

## 2018-05-02 DIAGNOSIS — G44.219 EPISODIC TENSION-TYPE HEADACHE, NOT INTRACTABLE: ICD-10-CM

## 2018-05-02 DIAGNOSIS — R05.9 COUGH: ICD-10-CM

## 2018-05-02 RX ORDER — ACETAMINOPHEN 500 MG
500-1000 TABLET ORAL EVERY 6 HOURS PRN
Qty: 100 TABLET | Refills: 2 | Status: SHIPPED | OUTPATIENT
Start: 2018-05-02 | End: 2018-08-29

## 2018-05-02 RX ORDER — CALCIUM CARBONATE 500 MG/1
1 TABLET, CHEWABLE ORAL DAILY
Qty: 150 TABLET | Refills: 0 | Status: SHIPPED | OUTPATIENT
Start: 2018-05-02 | End: 2018-08-29

## 2018-05-02 RX ORDER — GUAIFENESIN/DEXTROMETHORPHAN 100-10MG/5
5 SYRUP ORAL EVERY 4 HOURS PRN
Qty: 560 ML | Refills: 0 | Status: SHIPPED | OUTPATIENT
Start: 2018-05-02 | End: 2018-08-29

## 2018-05-02 ASSESSMENT — ENCOUNTER SYMPTOMS
APPETITE CHANGE: 1
DYSURIA: 0
UNEXPECTED WEIGHT CHANGE: 0
VOMITING: 0
COUGH: 1
FEVER: 0
SHORTNESS OF BREATH: 0
CONSTIPATION: 0
NAUSEA: 0
DIARRHEA: 0
CHILLS: 0
HEMATURIA: 0
ABDOMINAL PAIN: 1

## 2018-05-02 NOTE — MR AVS SNAPSHOT
After Visit Summary   2018    Tonia Smith    MRN: [id number]           Patient Information     Date Of Birth          1985        Visit Information        Provider Department      2018 8:20 AM Baylee Garrido MD [facility]        Today's Diagnoses     Gastroesophageal reflux disease without esophagitis    -  1    Acute left-sided low back pain without sciatica        Episodic tension-type headache, not intractable        Cough          Care Instructions    Take Zantac twice day every day. Try for at least 4 weeks. Come back in 4 weeks if no change in pain.   You can also take TUMS as needed.   Avoid using too much ibuprofen, aleve or aspirin.           Follow-ups after your visit        Who to contact     Please call your clinic at [phone] to:    Ask questions about your health    Make or cancel appointments    Discuss your medicines    Learn about your test results    Speak to your doctor            Additional Information About Your Visit        MyChart Information     [employer] is an electronic gateway that provides easy, online access to your medical records. With [employer], you can request a clinic appointment, read your test results, renew a prescription or communicate with your care team.     To sign up for [employer] visit the website at [email or web address]   You will be asked to enter the access code listed below, as well as some personal information. Please follow the directions to create your username and password.     Your access code is: [email or web address]  Expires: 2018  2:56 PM     Your access code will  in 90 days. If you need help or a new code, please contact your [facility] or call [phone] for assistance.        Care EveryWhere ID     This is your Care EveryWhere ID. This could be used by other organizations to access your [place] medical records  [id number]        Your Vitals Were     Pulse  Temperature Respirations Last Period Pulse Oximetry BMI (Body Mass Index)    84 98.1  F (36.7  C) (Oral) 16 04/10/2018 (Exact Date) 100% 30.67 kg/m2       Blood Pressure from Last 3 Encounters:   05/02/18 115/77   04/06/18 134/89   03/23/18 127/86    Weight from Last 3 Encounters:   05/02/18 165 lb (74.8 kg)   04/06/18 165 lb 12.8 oz (75.2 kg)   03/23/18 166 lb 9.6 oz (75.6 kg)              Today, you had the following     No orders found for display         Today's Medication Changes          These changes are accurate as of 5/2/18  8:38 AM.  If you have any questions, ask your nurse or doctor.               Start taking these medicines.        Dose/Directions    calcium carbonate 500 MG chewable tablet   Commonly known as:  TUMS   Used for:  Gastroesophageal reflux disease without esophagitis   Started by:  Baylee Garrido MD        Dose:  1 chew tab   Take 1 tablet (500 mg) by mouth daily   Quantity:  150 tablet   Refills:  0       guaiFENesin-dextromethorphan 100-10 MG/5ML syrup   Commonly known as:  ROBITUSSIN DM   Used for:  Cough   Started by:  Baylee Garrido MD        Dose:  5 mL   Take 5 mLs by mouth every 4 hours as needed for cough   Quantity:  560 mL   Refills:  0       ranitidine 150 MG tablet   Commonly known as:  ZANTAC   Used for:  Gastroesophageal reflux disease without esophagitis   Started by:  Baylee Garrido MD        Dose:  150 mg   Take 1 tablet (150 mg) by mouth 2 times daily   Quantity:  60 tablet   Refills:  1         Stop taking these medicines if you haven't already. Please contact your care team if you have questions.     ibuprofen 200 MG tablet   Commonly known as:  ADVIL/MOTRIN   Stopped by:  Baylee Garrido MD                Where to get your medicines      These medications were sent to [facility] [place], [place] - [address] [facility] [address]     Phone:  [phone]     acetaminophen 500 MG tablet    calcium carbonate 500 MG  chewable tablet    diclofenac 1 % Gel topical gel    guaiFENesin-dextromethorphan 100-10 MG/5ML syrup    ranitidine 150 MG tablet                Primary Care Provider Office Phone # Fax #    Nattyarvindcecelia Garrido [phone]       [facility] [address]   [facility] [address]        Equal Access to Services     [facility] : Hadii aad ku hadasho Soomaali, waaxda luqadaha, qaybta kaalmada adeegyada, waxay idiin hayaan adeeg kharash la'aan . So [facility] [phone].    ATENCIÓN: Si habla español, tiene a gordillo disposición servicios gratuitos de asistencia lingüística. Carissa al [phone].    We comply with applicable federal civil rights laws and [place] laws. We do not discriminate on the basis of race, color, national origin, age, disability, sex, sexual orientation, or gender identity.            Thank you!     Thank you for choosing [facility]  for your care. Our goal is always to provide you with excellent care. Hearing back from our patients is one way we can continue to improve our services. Please take a few minutes to complete the written survey that you may receive in the mail after your visit with us. Thank you!             Your Updated Medication List - Protect others around you: Learn how to safely use, store and throw away your medicines at www.disposemymeds.org.          This list is accurate as of 5/2/18  8:38 AM.  Always use your most recent med list.                   Brand Name Dispense Instructions for use Diagnosis    acetaminophen 500 MG tablet    TYLENOL    100 tablet    Take 1-2 tablets (500-1,000 mg) by mouth every 6 hours as needed for mild pain    Episodic tension-type headache, not intractable       calcium carbonate 500 MG chewable tablet    TUMS    150 tablet    Take 1 tablet (500 mg) by mouth daily    Gastroesophageal reflux disease without esophagitis       diclofenac 1 % Gel topical gel    VOLTAREN    100 g    Apply 4 grams to  knees or 2 grams to hands four times daily using enclosed dosing card.    Acute left-sided low back pain without sciatica       guaiFENesin-dextromethorphan 100-10 MG/5ML syrup    ROBITUSSIN DM    560 mL    Take 5 mLs by mouth every 4 hours as needed for cough    Cough       ranitidine 150 MG tablet    ZANTAC    60 tablet    Take 1 tablet (150 mg) by mouth 2 times daily    Gastroesophageal reflux disease without esophagitis       tretinoin 0.025 % cream    RETIN-A    45 g    Spread a pea size amount into affected area topically at bedtime.  Use sunscreen SPF>20.    Melasma gravidarum, antepartum

## 2018-05-02 NOTE — PROGRESS NOTES
Preceptor Attestation:   Patient seen, evaluated and discussed with the resident. I have verified the content of the note, which accurately reflects my assessment of the patient and the plan of care.   Supervising Physician:  Mary Helm MD

## 2018-05-02 NOTE — PROGRESS NOTES
"      HPI:       Tonia Smith is a 33 year old who presents for the following  Patient presents with:  Abdominal Pain: upper left side pain x's 1 mo. Burning sensation. Flares up after eating. Heatburn senation as well.   Refill Request: Voltaren 1% cream      ABDOMINAL Pain     Onset: 1 month    Description:   Character: Burning, \"feels like something inside is flipping\"  Location: epigastric to LUQ  Radiation: None    Intensity: mild    Progression of Symptoms:  same    Accompanying Signs & Symptoms:  Fever/Chills?: No   Gas/Bloating:  YES sometimes, was having gas/bloating 2 weeks ago, not now  Dysuria:No   Hematuria: No   Nausea: No   Vomiting: No   Diarrhea:No   Constipation: NO       History:           Trauma: No   Previous similar pain: No    Previous tests done: H pylori stool Ag tested in 2015: negative    Precipitating factors:   Does the pain change with:     Food?: no     BM?: no     Urination:no     What makes it better?: has not tried anything       Therapies Tried and outcome: Nothing    LMP:  4/10/2018    An [employer]  was used for  this visit.      Problem, Medication and Allergy Lists were reviewed and are current.  Patient is an established patient of this clinic.         Review of Systems:   Review of Systems   Constitutional: Positive for appetite change (decreased appetite). Negative for chills, fever and unexpected weight change.   Respiratory: Positive for cough. Negative for shortness of breath.    Cardiovascular: Negative for chest pain.   Gastrointestinal: Positive for abdominal pain. Negative for constipation, diarrhea, nausea and vomiting.   Genitourinary: Negative for dysuria, hematuria and vaginal discharge.             Physical Exam:     Patient Vitals for the past 24 hrs:   BP Temp Temp src Pulse Resp SpO2 Weight   05/02/18 0820 115/77 98.1  F (36.7  C) Oral 84 16 100 % 165 lb (74.8 kg)     Body mass index is 30.67 kg/(m^2).  Vital signs normal except BMI     Physical Exam "   Constitutional: No distress.   Pulmonary/Chest: Effort normal.   Abdominal: Soft. Bowel sounds are normal. She exhibits no distension and no mass. There is no tenderness. There is no guarding.   Skin: She is not diaphoretic.   Psychiatric: She has a normal mood and affect. Her behavior is normal. Thought content normal.       Results:     PHQ-9 SCORE 6/16/2015 6/29/2017 5/2/2018   Total Score 0 - -   Total Score - 2 1     Assessment and Plan     1. Gastroesophageal reflux disease without esophagitis  Symptoms are most likely due to GERD given description. Patient has been tested for H pylori in 2015 and was negative. Will trial H2 blocker for 4 weeks with TUMS prn. If no improvement in abdominal pain in 4 weeks, can escalate to PPI. If no improvement with PPI, patient may require GI referral for upper endoscopy. Avoid PO NSAIDs.   Patient has stopped taking her POP for contraception. Discussed alternative options today and patient is considering Depo shot, undecided at this time and will continue to think about what she desires for contraception.   - ranitidine (ZANTAC) 150 MG tablet; Take 1 tablet (150 mg) by mouth 2 times daily  Dispense: 60 tablet; Refill: 1  - calcium carbonate (TUMS) 500 MG chewable tablet; Take 1 tablet (500 mg) by mouth daily  Dispense: 150 tablet; Refill: 0    2. Acute left-sided low back pain without sciatica  Patient requesting refill of voltaren gel. Refilled today.   - diclofenac (VOLTAREN) 1 % GEL topical gel; Apply 4 grams to knees or 2 grams to hands four times daily using enclosed dosing card.  Dispense: 100 g; Refill: 1    3. Episodic tension-type headache, not intractable  Tylenol refilled per patient request.   Avoid Ibuprofen as mentioned above  - acetaminophen (TYLENOL) 500 MG tablet; Take 1-2 tablets (500-1,000 mg) by mouth every 6 hours as needed for mild pain  Dispense: 100 tablet; Refill: 2    4. Cough  Robitussin prn for cough per patient request.   -  guaiFENesin-dextromethorphan (ROBITUSSIN DM) 100-10 MG/5ML syrup; Take 5 mLs by mouth every 4 hours as needed for cough  Dispense: 560 mL; Refill: 0    Medications Discontinued During This Encounter   Medication Reason     norethindrone (MICRONOR) 0.35 MG per tablet Stopped by Patient     ibuprofen (ADVIL/MOTRIN) 200 MG tablet Side effects     diclofenac (VOLTAREN) 1 % GEL topical gel Reorder     acetaminophen (TYLENOL) 500 MG tablet Reorder     Options for treatment and follow-up care were reviewed with the patient. Tonia Smith  engaged in the decision making process and verbalized understanding of the options discussed and agreed with the final plan.    Katiuska Garrido MD  Family Medicine PGY2

## 2018-05-02 NOTE — PATIENT INSTRUCTIONS
Take Zantac twice day every day. Try for at least 4 weeks. Come back in 4 weeks if no change in pain.   You can also take TUMS as needed.   Avoid using too much ibuprofen, aleve or aspirin.

## 2018-05-03 ASSESSMENT — PATIENT HEALTH QUESTIONNAIRE - PHQ9: SUM OF ALL RESPONSES TO PHQ QUESTIONS 1-9: 1

## 2018-08-21 ENCOUNTER — OFFICE VISIT (OUTPATIENT)
Dept: FAMILY MEDICINE | Facility: CLINIC | Age: 33
End: 2018-08-21
Payer: MEDICAID

## 2018-08-21 VITALS
SYSTOLIC BLOOD PRESSURE: 132 MMHG | TEMPERATURE: 98.1 F | RESPIRATION RATE: 16 BRPM | DIASTOLIC BLOOD PRESSURE: 90 MMHG | BODY MASS INDEX: 30.62 KG/M2 | WEIGHT: 166.4 LBS | HEIGHT: 62 IN | OXYGEN SATURATION: 99 % | HEART RATE: 91 BPM

## 2018-08-21 DIAGNOSIS — M54.41 ACUTE RIGHT-SIDED LOW BACK PAIN WITH RIGHT-SIDED SCIATICA: Primary | ICD-10-CM

## 2018-08-21 RX ORDER — IBUPROFEN 600 MG/1
600 TABLET, FILM COATED ORAL EVERY 6 HOURS PRN
Qty: 30 TABLET | Refills: 1 | Status: SHIPPED | OUTPATIENT
Start: 2018-08-21 | End: 2019-03-15

## 2018-08-21 RX ORDER — ACETAMINOPHEN 325 MG/1
325-650 TABLET ORAL EVERY 4 HOURS PRN
Qty: 100 TABLET | Refills: 0 | Status: SHIPPED | OUTPATIENT
Start: 2018-08-21 | End: 2018-09-03

## 2018-08-21 RX ORDER — CYCLOBENZAPRINE HCL 5 MG
5 TABLET ORAL
Qty: 42 TABLET | Refills: 0 | Status: SHIPPED | OUTPATIENT
Start: 2018-08-21 | End: 2018-09-17

## 2018-08-21 ASSESSMENT — ENCOUNTER SYMPTOMS
BACK PAIN: 1
MYALGIAS: 1
NERVOUS/ANXIOUS: 0
NAUSEA: 0
CHILLS: 0
FEVER: 0
DIARRHEA: 0
ABDOMINAL PAIN: 0
HEADACHES: 0

## 2018-08-21 NOTE — NURSING NOTE
Due to patient being non-English speaking/uses sign language, an  was used for this visit. Only for face-to-face interpretation by an external agency, date and length of interpretation can be found on the scanned worksheet.     name: Yuli CHÁVEZ  Agency: [employer]  Language: [facility]   Telephone number: [phone]  Type of interpretation: Face-to-face, spoken

## 2018-08-21 NOTE — PATIENT INSTRUCTIONS
Here is the plan from today's visit    1. Acute right-sided low back pain with right-sided sciatica  Alternate the tylenol (1-2 pills at a time) with the Ibuprofen every 3-4 hours.  Use the gel as needed over the back and leg.  Use the heating pad for the back.  Use the Flexeril (strong muscle relaxant that will make you sleepy) at night.  You can take 1 or 2 pills at a time.    If not improving with physical therapy, return to clinic or if symptoms get worse, return to clinic.     - order for DME; Equipment being ordered: heating pad  Dispense: 1 Device; Refill: 0  - [facility], PT, HAND AND CHIROPRACTIC REFERRAL - [facility]  - diclofenac (VOLTAREN) 1 % GEL topical gel; Apply 4 grams to knees or 2 grams to hands four times daily using enclosed dosing card.  Dispense: 100 g; Refill: 1  - cyclobenzaprine (FLEXERIL) 5 MG tablet; Take 1 tablet (5 mg) by mouth nightly as needed  Dispense: 42 tablet; Refill: 0  - ibuprofen (ADVIL/MOTRIN) 600 MG tablet; Take 1 tablet (600 mg) by mouth every 6 hours as needed for moderate pain  Dispense: 30 tablet; Refill: 1  - acetaminophen (TYLENOL) 325 MG tablet; Take 1-2 tablets (325-650 mg) by mouth every 4 hours as needed for mild pain  Dispense: 100 tablet; Refill: 0      Follow up plan  Follow up with OMT this week.   Return to clinic in one week if not better.   Make an appointment with Dr. Candelaria, Dr. Augustin, or Dr. Stallworth for tomorrow.     [facility] providers:     Thank you for coming to [facility] today.  Lab Testing:  **If you had lab testing today and your results are reassuring or normal they will be mailed to you or sent through [employer] within 7 days.   **If the lab tests need quick action we will call you with the results.  The phone number we will call with results is # [phone] (home) . If this is not the best number please call our clinic and change the number.  Medication Refills:  If you need any refills please call your pharmacy and they will contact us.   If you need to   your refill at a new pharmacy, please contact the new pharmacy directly. The new pharmacy will help you get your medications transferred faster.   Scheduling:  If you have any concerns about today's visit or wish to schedule another appointment please call our office during normal business hours [phone] (8-5:00 M-F)  If a referral was made to a [facility] and you don't get a call from central scheduling please call [phone].  If a Mammogram was ordered for you at [facility] call [phone] to schedule or change your appointment.  If you had an XRay/CT/Ultrasound/MRI ordered the number is [phone] to schedule or change your radiology appointment.   Medical Concerns:  If you have urgent medical concerns please call [phone] at any time of the day.    Arianne Tse MD

## 2018-08-21 NOTE — MR AVS SNAPSHOT
After Visit Summary   8/21/2018    Tonia Smith    MRN: [id number]           Patient Information     Date Of Birth          1985        Visit Information        Provider Department      8/21/2018 3:20 PM Arianne Tse MD [facility]        Today's Diagnoses     Acute right-sided low back pain with right-sided sciatica    -  1      Care Instructions    Here is the plan from today's visit    1. Acute right-sided low back pain with right-sided sciatica  Alternate the tylenol (1-2 pills at a time) with the Ibuprofen every 3-4 hours.  Use the gel as needed over the back and leg.  Use the heating pad for the back.  Use the Flexeril (strong muscle relaxant that will make you sleepy) at night.  You can take 1 or 2 pills at a time.    If not improving with physical therapy, return to clinic or if symptoms get worse, return to clinic.     - order for DME; Equipment being ordered: heating pad  Dispense: 1 Device; Refill: 0  - [facility], PT, HAND AND CHIROPRACTIC REFERRAL - [facility]  - diclofenac (VOLTAREN) 1 % GEL topical gel; Apply 4 grams to knees or 2 grams to hands four times daily using enclosed dosing card.  Dispense: 100 g; Refill: 1  - cyclobenzaprine (FLEXERIL) 5 MG tablet; Take 1 tablet (5 mg) by mouth nightly as needed  Dispense: 42 tablet; Refill: 0  - ibuprofen (ADVIL/MOTRIN) 600 MG tablet; Take 1 tablet (600 mg) by mouth every 6 hours as needed for moderate pain  Dispense: 30 tablet; Refill: 1  - acetaminophen (TYLENOL) 325 MG tablet; Take 1-2 tablets (325-650 mg) by mouth every 4 hours as needed for mild pain  Dispense: 100 tablet; Refill: 0      Follow up plan  Follow up with OMT this week.   Return to clinic in one week if not better.   Make an appointment with Dr. Candelaria, Dr. Augustin, or Dr. Stallworth for tomorrow.     OMT providers:     Thank you for coming to [facility] today.  Lab Testing:  **If you had lab testing today and your results are reassuring or normal they will  be mailed to you or sent through [employer] within 7 days.   **If the lab tests need quick action we will call you with the results.  The phone number we will call with results is # [phone] (home) . If this is not the best number please call our clinic and change the number.  Medication Refills:  If you need any refills please call your pharmacy and they will contact us.   If you need to  your refill at a new pharmacy, please contact the new pharmacy directly. The new pharmacy will help you get your medications transferred faster.   Scheduling:  If you have any concerns about today's visit or wish to schedule another appointment please call our office during normal business hours [phone] (8-5:00 M-F)  If a referral was made to a [facility] and you don't get a call from central scheduling please call [phone].  If a Mammogram was ordered for you at [facility] call [phone] to schedule or change your appointment.  If you had an XRay/CT/Ultrasound/MRI ordered the number is [phone] to schedule or change your radiology appointment.   Medical Concerns:  If you have urgent medical concerns please call [phone] at any time of the day.    Arianne Tse MD            Follow-ups after your visit        Additional Services     [facility], PT, HAND AND CHIROPRACTIC REFERRAL - [facility]       **This order will print in the [facility] Scheduling Office**    Physical Therapy, Hand Therapy and Chiropractic Care are available through:    *[facility]  *[facility]  *[facility]    Call one number to schedule at any of the above locations: [phone].    Your provider has referred you to: Physical Therapy at [facility] or [facility]    Indication/Reason for Referral: Low Back Pain  Onset of Illness: 2 months  Therapy Orders: Evaluate and Treat  Special Programs: None  Special Request: None    Rosmery Botello      Additional Comments for the  "Therapist or Chiropractor: None    Please be aware that coverage of these services is subject to the terms and limitations of your health insurance plan.  Call member services at your health plan with any benefit or coverage questions.      Please bring the following to your appointment:    *Your personal calendar for scheduling future appointments  *Comfortable clothing                  Who to contact     Please call your clinic at [phone] to:    Ask questions about your health    Make or cancel appointments    Discuss your medicines    Learn about your test results    Speak to your doctor            Additional Information About Your Visit        Care EveryWhere ID     This is your Care EveryWhere ID. This could be used by other organizations to access your [facility] medical records  [id number]        Your Vitals Were     Pulse Temperature Respirations Height Pulse Oximetry Breastfeeding?    91 98.1  F (36.7  C) (Oral) 16 5' 1.5\" (156.2 cm) 99% No    BMI (Body Mass Index)                   30.93 kg/m2            Blood Pressure from Last 3 Encounters:   08/21/18 132/90   05/02/18 115/77   04/06/18 134/89    Weight from Last 3 Encounters:   08/21/18 166 lb 6.4 oz (75.5 kg)   05/02/18 165 lb (74.8 kg)   04/06/18 165 lb 12.8 oz (75.2 kg)              We Performed the Following     [facility], PT, HAND AND CHIROPRACTIC REFERRAL - [facility]          Today's Medication Changes          These changes are accurate as of 8/21/18  4:15 PM.  If you have any questions, ask your nurse or doctor.               Start taking these medicines.        Dose/Directions    cyclobenzaprine 5 MG tablet   Commonly known as:  FLEXERIL   Used for:  Acute right-sided low back pain with right-sided sciatica   Started by:  Arianne Tse MD        Dose:  5 mg   Take 1 tablet (5 mg) by mouth nightly as needed   Quantity:  42 tablet   Refills:  0       ibuprofen 600 MG tablet   Commonly known as:  ADVIL/MOTRIN   Used for:  Acute right-sided low " back pain with right-sided sciatica   Started by:  Arianne Tse MD        Dose:  600 mg   Take 1 tablet (600 mg) by mouth every 6 hours as needed for moderate pain   Quantity:  30 tablet   Refills:  1       order for DME   Used for:  Acute right-sided low back pain with right-sided sciatica   Started by:  Arianne Tse MD        Equipment being ordered: heating pad   Quantity:  1 Device   Refills:  0         These medicines have changed or have updated prescriptions.        Dose/Directions    * acetaminophen 500 MG tablet   Commonly known as:  TYLENOL   This may have changed:  Another medication with the same name was added. Make sure you understand how and when to take each.   Used for:  Episodic tension-type headache, not intractable   Changed by:  Arianne Tse MD        Dose:  500-1000 mg   Take 1-2 tablets (500-1,000 mg) by mouth every 6 hours as needed for mild pain   Quantity:  100 tablet   Refills:  2       * acetaminophen 325 MG tablet   Commonly known as:  TYLENOL   This may have changed:  You were already taking a medication with the same name, and this prescription was added. Make sure you understand how and when to take each.   Used for:  Acute right-sided low back pain with right-sided sciatica   Changed by:  Arianne Tse MD        Dose:  325-650 mg   Take 1-2 tablets (325-650 mg) by mouth every 4 hours as needed for mild pain   Quantity:  100 tablet   Refills:  0       * Notice:  This list has 2 medication(s) that are the same as other medications prescribed for you. Read the directions carefully, and ask your doctor or other care provider to review them with you.         Where to get your medicines      These medications were sent to [facility] [place], [place] - [address] [facility] [address]     Phone:  [phone]     acetaminophen 325 MG tablet    cyclobenzaprine 5 MG tablet    diclofenac 1 % Gel topical gel    ibuprofen  600 MG tablet         Some of these will need a paper prescription and others can be bought over the counter.  Ask your nurse if you have questions.     Bring a paper prescription for each of these medications     order for DME                Primary Care Provider Office Phone # Fax #    Baylee Garrido [phone]       [facility] [address]   [facility] [address]        Equal Access to Services     ESPERANZA PEÑA : Hadii aad ku hadasho Soomaali, waaxda luqadaha, qaybta kaalmada adeegyada, waxay idiin hayaan adeeg jazmynsh la'jacekn ah. So [facility] [phone].    ATENCIÓN: Si habla espmariluz, tiene a gordillo disposición servicios gratuitos de asistencia lingüística. Carissa al [phone].    We comply with applicable federal civil rights laws and [place] laws. We do not discriminate on the basis of race, color, national origin, age, disability, sex, sexual orientation, or gender identity.            Thank you!     Thank you for choosing [facility]  for your care. Our goal is always to provide you with excellent care. Hearing back from our patients is one way we can continue to improve our services. Please take a few minutes to complete the written survey that you may receive in the mail after your visit with us. Thank you!             Your Updated Medication List - Protect others around you: Learn how to safely use, store and throw away your medicines at www.disposemymeds.org.          This list is accurate as of 8/21/18  4:15 PM.  Always use your most recent med list.                   Brand Name Dispense Instructions for use Diagnosis    * acetaminophen 500 MG tablet    TYLENOL    100 tablet    Take 1-2 tablets (500-1,000 mg) by mouth every 6 hours as needed for mild pain    Episodic tension-type headache, not intractable       * acetaminophen 325 MG tablet    TYLENOL    100 tablet    Take 1-2 tablets (325-650 mg) by mouth every 4 hours as needed for mild pain     Acute right-sided low back pain with right-sided sciatica       calcium carbonate 500 MG chewable tablet    TUMS    150 tablet    Take 1 tablet (500 mg) by mouth daily    Gastroesophageal reflux disease without esophagitis       cyclobenzaprine 5 MG tablet    FLEXERIL    42 tablet    Take 1 tablet (5 mg) by mouth nightly as needed    Acute right-sided low back pain with right-sided sciatica       diclofenac 1 % Gel topical gel    VOLTAREN    100 g    Apply 4 grams to knees or 2 grams to hands four times daily using enclosed dosing card.    Acute right-sided low back pain with right-sided sciatica       guaiFENesin-dextromethorphan 100-10 MG/5ML syrup    ROBITUSSIN DM    560 mL    Take 5 mLs by mouth every 4 hours as needed for cough    Cough       ibuprofen 600 MG tablet    ADVIL/MOTRIN    30 tablet    Take 1 tablet (600 mg) by mouth every 6 hours as needed for moderate pain    Acute right-sided low back pain with right-sided sciatica       order for DME     1 Device    Equipment being ordered: heating pad    Acute right-sided low back pain with right-sided sciatica       ranitidine 150 MG tablet    ZANTAC    60 tablet    Take 1 tablet (150 mg) by mouth 2 times daily    Gastroesophageal reflux disease without esophagitis       * Notice:  This list has 2 medication(s) that are the same as other medications prescribed for you. Read the directions carefully, and ask your doctor or other care provider to review them with you.

## 2018-08-21 NOTE — PROGRESS NOTES
COMPA Smith is a 33 year old  who presents for   Chief Complaint   Patient presents with     Pain     Lower back pain [id number]     [employer]  was used for  this visit.      Patient is a 33 year old female with a history of intermittent lower back pain that comes and goes for the last 2 years.  Pain is usually relieved with physical therapy.      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    Description:   Location of pain: low bilateral back radiating to right leg (was left leg in the past, still has occasional pain in left leg)  Character of pain: sharp radiating pain down leg  Pain radiation: starts in middle of lower back, goes through back of leg to foot, worse with walking and sitting  New numbness or weakness in legs, not attributed to pain:  No   Any new bowel or bladder incontinence?No     Intensity: Currently 9/10    History:   Pain interferes with job/home/school:  YES, difficulty with standing, walking and laying due to pain  History of back problems: yes, has had lower back pain in past, improved with physical therapy each time  Any previous MRI or X-rays: None  Sees a specialist for back pain:  No  Therapies tried without relief: Tried OMT at [facility] (helped), physical therapy helped in past    Alleviating factors:   Improved by: Physical Therapy and OMT     Precipitating factors:  Worsened by: Sitting, Lying Flat and Walking.  Very difficult to get up when sleeping      Accompanying Signs & Symptoms:  Risk of Fracture: No   Risk of Cauda Equina:No   Risk of Infection:  No   Risk of Cancer:  No       Problem, Medication and Allergy Lists were reviewed and updated if needed..    Patient is an established patient of this clinic..         Review of Systems:   Review of Systems   Constitutional: Negative for chills and fever.   Gastrointestinal: Negative for abdominal pain, diarrhea and nausea.  "  Genitourinary: Negative for enuresis, pelvic pain and urgency.   Musculoskeletal: Positive for back pain and myalgias.        Right leg pain   Neurological: Negative for headaches.   Psychiatric/Behavioral: The patient is not nervous/anxious.             Physical Exam:     Vitals:    08/21/18 1536   Resp: 16   Weight: 166 lb 6.4 oz (75.5 kg)   Height: 5' 1.5\" (156.2 cm)     Body mass index is 30.93 kg/(m^2).  Vitals were reviewed and were normal     Physical Exam   Constitutional: She is oriented to person, place, and time. She appears well-developed and well-nourished.   Difficulty with ambulation due to pain   HENT:   Head: Normocephalic.   Neck: Normal range of motion. Neck supple.   Cardiovascular: Normal rate.    Pulmonary/Chest: Effort normal.   Musculoskeletal: She exhibits no edema.   No tenderness along spinous processes of cervical, thoracic, or lumbar spine.  Paravertebral muscle tenderness bilaterally of lower lumbar area.  Significant difficulty with ambulation, sitting, and laying.Lower extremities 4/5 with hip flexion, 5/5 with hip extension and 5/5 with knee flexion/extension.  2+ patellar and achilles reflexes.  Positive right leg straight leg test.    Neurological: She is alert and oriented to person, place, and time.   Skin: No rash noted. No erythema. No pallor.   Psychiatric: She has a normal mood and affect. Her behavior is normal. Judgment and thought content normal.   Vitals reviewed.        Results:   No labs ordered.     Assessment and Plan        Tonia was seen today for pain.  Patient has had intermittent low back pain for the last 2 years.  At this time, will provide referral to physical therapy and patient also referred to OMT.  If following plan does not work, may consider pelvic floor therapy.      Diagnoses and all orders for this visit:    Acute right-sided low back pain with right-sided sciatica  -     order for DME; Equipment being ordered: heating pad  -     [facility], PT, HAND AND " CHIROPRACTIC REFERRAL - [facility]  -     diclofenac (VOLTAREN) 1 % GEL topical gel; Apply 4 grams to knees or 2 grams to hands four times daily using enclosed dosing card.  -     cyclobenzaprine (FLEXERIL) 5 MG tablet; Take 1 tablet (5 mg) by mouth nightly as needed  -     ibuprofen (ADVIL/MOTRIN) 600 MG tablet; Take 1 tablet (600 mg) by mouth every 6 hours as needed for moderate pain  -     acetaminophen (TYLENOL) 325 MG tablet; Take 1-2 tablets (325-650 mg) by mouth every 4 hours as needed for mild pain    Patient Instructions:  Alternate the tylenol (1-2 pills at a time) with the Ibuprofen every 3-4 hours.  Use the gel as needed over the back and leg.  Use the heating pad for the back.  Use the Flexeril (strong muscle relaxant that will make you sleepy) at night.  You can take 1 or 2 pills at a time.    If not improving with physical therapy, return to clinic or if symptoms get worse, return to clinic.       There are no discontinued medications.    Options for treatment and follow-up care were reviewed with the patient. Tonia Smith  engaged in the decision making process and verbalized understanding of the options discussed and agreed with the final plan.    Arianne Tse M.D.  PGY-3, Family Medicine

## 2018-08-21 NOTE — PROGRESS NOTES
Preceptor Attestation:   Patient seen, evaluated and discussed with the resident. I have verified the content of the note, which accurately reflects my assessment of the patient and the plan of care.   Supervising Physician:  Arianne Montesinos MD

## 2018-08-24 ENCOUNTER — THERAPY VISIT (OUTPATIENT)
Dept: PHYSICAL THERAPY | Facility: CLINIC | Age: 33
End: 2018-08-24
Attending: FAMILY MEDICINE
Payer: MEDICAID

## 2018-08-24 DIAGNOSIS — M54.41 BILATERAL LOW BACK PAIN WITH RIGHT-SIDED SCIATICA: Primary | ICD-10-CM

## 2018-08-24 PROCEDURE — 97140 MANUAL THERAPY 1/> REGIONS: CPT | Mod: GP | Performed by: PHYSICAL THERAPIST

## 2018-08-24 PROCEDURE — T1013 SIGN LANG/ORAL INTERPRETER: HCPCS | Mod: U3 | Performed by: PHYSICAL THERAPIST

## 2018-08-24 PROCEDURE — 97161 PT EVAL LOW COMPLEX 20 MIN: CPT | Mod: GP | Performed by: PHYSICAL THERAPIST

## 2018-08-24 PROCEDURE — 97530 THERAPEUTIC ACTIVITIES: CPT | Mod: GP | Performed by: PHYSICAL THERAPIST

## 2018-08-24 NOTE — PROGRESS NOTES
Physical Therapy Initial Evaluation  August 24, 2018     Precautions/Restrictions/MD instructions: PT eval and treat.     Subjective:  Per interpretor. Reports pain that started a month ago, no mechanism. Similar pain to a year ago, but now with R leg pain which is new. Pain and paresthesia all down L posterior leg - struggles to walk. Likes to lean to L when sitting. Only comfortable position is lying on L side.  Date of Onset: 8/21/18 (MD referral)  C/C: LBP with R radiating symptoms.   Quality of pain is sharp. Pains are described as constant in nature. Pain is worse: constant. Pain is rated 9/10.   History of symptoms: Pains began suddenly with no known mechanism. Since onset, symptoms are same.  Worsened by: any movement.    Alleviated by: Rest.    General health as reported by patient:   Pertinent medical/surgical history: none reported. Imaging: none. Current occupational status: . Patient's goals are: decrease pain, improve function. Return to MD:  PRN.     Therapist Impression:   Tonia Smith is a 33 year old female with 1 month history of severe LBP. She presents with signs and symptoms consistent with extreme LBP with likely derangement / disc involvement with R radiating symptoms and an aversion to any movement. These impairments limit her ability to stand, walk, sit, sleep, and perform many ADLs. Skilled PT services are necessary in order to reduce impairments and improve independent function.    Objective:  LUMBAR:    Very limited objective exam today due to high level of pain.    Posture: Extremely guarded, refuses to stand, when sitting has prominent R lateral shift (shoulders)  Relevant Lateral Shift: Right    Neurological: All results within normal limits unless otherwise noted.    -Unable to assess accurately d/t pt guarding and pain        AROM: (Major, Moderate, Minimal or Nil loss)  Movement Loss Jesús Mod Min Nil Pain   Flexion x     yes   Extension  x    yes   Side Gliding L  x      Side  Gliding R  x        Repeated Motions  Too painful for pt to complete    Other Tests:  Lumbar Spring Testing: Unable to assess - refused to lie prone  Squat: NA    Ambulation: Shuffling gait, holding tightly onto . Slow, deliberate movements    Pain improved quite a bit with sidelying (L) positional traction: pillow under L side, PT overpressure     Provisional Classification: Derangement  Principle of Management: Traction initially - pain management           Assessment/Plan:    The patient is a 33 year old female with chief complaint of LBP with R LE pain.    The patient has the following significant findings with corresponding treatment plan.  Diagnosis 1:  LBP with R radiating sxs    Pain -  hot/cold therapy, electric stimulation, mechanical traction, manual therapy, splint/taping/bracing/orthotics, self management, education, directional preference exercise and home program  Decreased ROM/flexibility - manual therapy, therapeutic exercise, therapeutic activity and home program  Decreased joint mobility - manual therapy, therapeutic exercise, therapeutic activity and home program  Decreased strength - therapeutic exercise, therapeutic activities and home program  Impaired muscle performance - neuro re-education and home program  Decreased function - therapeutic activities and home program  Impaired posture - neuro re-education, therapeutic activities and home program        Therapy Evaluation Codes:   1) History comprised of:   Personal factors that impact the plan of care:      Language.    Comorbidity factors that impact the plan of care are:      None.     Medications impacting care: Pain and Sleep.  2) Examination of Body Systems comprised of:   Body structures and functions that impact the plan of care:      Lumbar spine.   Activity limitations that impact the plan of care are:      Bending, Dressing, Lifting, Sitting, Squatting/kneeling, Stairs, Standing and Walking.   Clinical presentation  characteristics are:    Stable/Uncomplicated.  3) Presentation comprised of:   Presentation scored as Low complexity with uncomplicated characteristics..  4) Decision-Making    Low complexity using standardized patient assessment instrument and/or measureable assessment of functional outcome.  Cumulative Therapy Evaluation is: Low complexity.    Previous and current functional limitations:  (See Goal Flow Sheet for this information)    Short term and Long term goals: (See Goal Flow Sheet for this information)     Communication ability:  Patient has an  for communication clarity.  Treatment Explanation - The following has been discussed with the patient: RX ordered/plan of care, anticipated outcomes, and possible risks and side effects.  This patient would benefit from PT intervention to resume normal activities.   Rehab potential is fair.    Frequency:  2 X week, once daily  Duration:  for 6 weeks  Discharge Plan: Achieve all LTGs, be independent in home treatment program, and reach maximal therapeutic benefit.    Please refer to the daily flowsheet for treatment today, total treatment time and time spent performing 1:1 timed codes.

## 2018-08-24 NOTE — LETTER
DEPARTMENT OF HEALTH AND HUMAN SERVICES  [facility] FOR MEDICARE & MEDICAID SERVICES    PLAN/UPDATED PLAN OF PROGRESS FOR OUTPATIENT REHABILITATION    PATIENTS NAME:  Tonia Smith     : 1985    PROVIDER NUMBER:    [id number]    [id number]     PROVIDER NAME: [facility]    MEDICAL RECORD NUMBER: [id number]     START OF CARE DATE:  SOC Date: 18   TYPE:  PT    PRIMARY/TREATMENT DIAGNOSIS: (Pertinent Medical Diagnosis)  Bilateral low back pain with right-sided sciatica    VISITS FROM START OF CARE:  Rxs Used: 1     Physical Therapy Initial Evaluation  2018     Precautions/Restrictions/MD instructions: PT eval and treat.   Subjective:  Per interpretor. Reports pain that started a month ago, no mechanism. Similar pain to a year ago, but now with R leg pain which is new. Pain and paresthesia all down L posterior leg - struggles to walk. Likes to lean to L when sitting. Only comfortable position is lying on L side.  Date of Onset: 18 (MD referral)  C/C: LBP with R radiating symptoms.   Quality of pain is sharp. Pains are described as constant in nature. Pain is worse: constant. Pain is rated 9/10.   History of symptoms: Pains began suddenly with no known mechanism. Since onset, symptoms are same.  Worsened by: any movement.    Alleviated by: Rest.    General health as reported by patient:   Pertinent medical/surgical history: none reported. Imaging: none. Current occupational status: . Patient's goals are: decrease pain, improve function. Return to MD:  PRN.   Therapist Impression:   Tonia Smith is a 33 year old female with 1 month history of severe LBP. She presents with signs and symptoms consistent with extreme LBP with likely derangement / disc involvement with R radiating symptoms and an aversion to any movement. These impairments limit her ability to stand, walk, sit, sleep, and perform many ADLs. Skilled PT services are necessary in order to reduce  impairments and improve independent function.    PATIENTS NAME:  Tonia Smith   : 1985    Objective:  LUMBAR:  Very limited objective exam today due to high level of pain.    Posture: Extremely guarded, refuses to stand, when sitting has prominent R lateral shift (shoulders)  Relevant Lateral Shift: Right    Neurological: All results within normal limits unless otherwise noted.  -Unable to assess accurately d/t pt guarding and pain    AROM: (Major, Moderate, Minimal or Nil loss)  Movement Loss Jesús Mod Min Nil Pain   Flexion x     yes   Extension  x    yes   Side Gliding L  x      Side Gliding R  x        Repeated Motions  Too painful for pt to complete    Other Tests:  Lumbar Spring Testing: Unable to assess - refused to lie prone  Squat: NA    Ambulation: Shuffling gait, holding tightly onto . Slow, deliberate movements  Pain improved quite a bit with sidelying (L) positional traction: pillow under L side, PT overpressure     Provisional Classification: Derangement  Principle of Management: Traction initially - pain management     Assessment/Plan:    The patient is a 33 year old female with chief complaint of LBP with R LE pain.    The patient has the following significant findings with corresponding treatment plan.  Diagnosis 1:  LBP with R radiating sxs    Pain -  hot/cold therapy, electric stimulation, mechanical traction, manual therapy, splint/taping/bracing/orthotics, self management, education, directional preference exercise and home program  Decreased ROM/flexibility - manual therapy, therapeutic exercise, therapeutic activity and home program  Decreased joint mobility - manual therapy, therapeutic exercise, therapeutic activity and home program  Decreased strength - therapeutic exercise, therapeutic activities and home program  Impaired muscle performance - neuro re-education and home program  Decreased function - therapeutic activities and home program  Impaired posture - neuro  re-education, therapeutic activities and home program    Therapy Evaluation Codes:   1) History comprised of:   Personal factors that impact the plan of care:      Language.    Comorbidity factors that impact the plan of care are:      None.     Medications impacting care: Pain and Sleep.  PATIENTS NAME:  Tonia Smith   : 1985    2) Examination of Body Systems comprised of:   Body structures and functions that impact the plan of care:      Lumbar spine.   Activity limitations that impact the plan of care are:      Bending, Dressing, Lifting, Sitting, Squatting/kneeling, Stairs, Standing and Walking.   Clinical presentation characteristics are:    Stable/Uncomplicated.  3) Presentation comprised of:   Presentation scored as Low complexity with uncomplicated characteristics..  4) Decision-Making    Low complexity using standardized patient assessment instrument and/or measureable assessment of functional outcome.  Cumulative Therapy Evaluation is: Low complexity.    Previous and current functional limitations:  (See Goal Flow Sheet for this information)    Short term and Long term goals: (See Goal Flow Sheet for this information)     Communication ability:  Patient has an  for communication clarity.  Treatment Explanation - The following has been discussed with the patient: RX ordered/plan of care, anticipated outcomes, and possible risks and side effects.  This patient would benefit from PT intervention to resume normal activities.   Rehab potential is fair.    Frequency:  2 X week, once daily  Duration:  for 6 weeks  Discharge Plan: Achieve all LTGs, be independent in home treatment program, and reach maximal therapeutic benefit.    Please refer to the daily flowsheet for treatment today, total treatment time and time spent performing 1:1 timed codes.       Caregiver Signature/Credentials _____________________________ Date ________       Treating Provider: Chris Joyce DPT   I have reviewed  "and certified the need for these services and plan of treatment while under my care.        PHYSICIAN'S SIGNATURE:   _____________________________________  Date___________     Arianne Tse MD    Certification period:  Beginning of Cert date period: 08/24/18 to End of Cert period date: 11/21/18     Functional Level Progress Report: Please see attached \"Goal Flow sheet for Functional level.\"    ____X____ Continue Services or       ________ DC Services                Service dates: From  SOC Date: 08/24/18 date to present                         "

## 2018-08-24 NOTE — MR AVS SNAPSHOT
After Visit Summary   8/24/2018    Tonia Smith    MRN: [id number]           Patient Information     Date Of Birth          1985        Visit Information        Provider Department      8/24/2018 7:55 AM Chris Joyce, PT; [employer] [facility]        Today's Diagnoses     Bilateral low back pain with right-sided sciatica    -  1       Follow-ups after your visit        Your next 10 appointments already scheduled     Aug 28, 2018  9:15 AM CDT   [facility] Spine with Walter Wright PT   [facility] ([facility])    [address] [place] [id number]   [phone]            Aug 29, 2018 10:20 AM CDT   Return Visit with Dayna Franklin MD   [facility] ([facility])    [address] [place] [address],  [address]  [facility] [address]   [phone]            Aug 30, 2018 10:50 AM CDT   [facility] Spine with Chris Joyce PT   [facility] ([facility])    [address] [place] [id number] [phone]            Sep 04, 2018  9:30 AM CDT   [facility] Spine with Chris Joyce PT   [facility] ([facility])    [address] [place] [id number] [phone]            Sep 06, 2018  9:30 AM CDT   [facility] Spine with Chris Joyce PT   [facility] ([facility])    [address] [place] [id number] [phone]              Who to contact     If you have questions or need follow up information about today's clinic visit or your schedule please contact [facility] directly at [phone].  Normal or non-critical lab and imaging results will be communicated to you by MyChart, letter or phone within 4 business days after the clinic has received the results. If you do not hear from us within 7 days, please contact the clinic through MyChart or phone.  If you have a critical or abnormal lab result, we will notify you by phone as soon as possible.  Submit refill requests through [employer] or call your pharmacy and they will forward the refill request to us. Please allow 3 business days for your refill to be completed.          Additional Information About Your Visit        Care EveryWhere ID     This is your Care EveryWhere ID. This could be used by other organizations to access your [facility] medical records  [id number]         Blood Pressure from Last 3 Encounters:   08/21/18 132/90   05/02/18 115/77   04/06/18 134/89    Weight from Last 3 Encounters:   08/21/18 75.5 kg (166 lb 6.4 oz)   05/02/18 74.8 kg (165 lb)   04/06/18 75.2 kg (165 lb 12.8 oz)              We Performed the Following     HC PT EVAL, LOW COMPLEXITY     [facility] CERT REPORT     [facility] INITIAL EVAL REPORT     MANUAL THER TECH,1+REGIONS,EA 15 MIN     THERAPEUTIC ACTIVITIES        Primary Care Provider Office Phone # Fax #    Baylee MD Radhika [phone]       [facility] [address] [address]        Equal Access to Services     [facility] : Hadii aad ku jamey Nolen, waaxda lujohnathanadaha, qaybta kaalmada sj, britney simons . So [facility] [phone].    ATENCIÓN: Si habla español, tiene a gordillo disposición servicios gratuitos de asistencia lingüística. [facility] [phone].    We comply with applicable federal civil rights laws and [place] laws. We do not discriminate on the basis of race, color, national origin, age, disability, sex, sexual orientation, or gender identity.            Thank you!     Thank you for choosing [facility]  for your care. Our goal is always to provide you with excellent care. Hearing back from our patients is one way we can continue to improve our services. Please take a few minutes to complete the written survey that you may receive in the mail after your visit with  us. Thank you!             Your Updated Medication List - Protect others around you: Learn how to safely use, store and throw away your medicines at www.disposemymeds.org.          This list is accurate as of 8/24/18  3:33 PM.  Always use your most recent med list.                   Brand Name Dispense Instructions for use Diagnosis    * acetaminophen 500 MG tablet    TYLENOL    100 tablet    Take 1-2 tablets (500-1,000 mg) by mouth every 6 hours as needed for mild pain    Episodic tension-type headache, not intractable       * acetaminophen 325 MG tablet    TYLENOL    100 tablet    Take 1-2 tablets (325-650 mg) by mouth every 4 hours as needed for mild pain    Acute right-sided low back pain with right-sided sciatica       calcium carbonate 500 MG chewable tablet    TUMS    150 tablet    Take 1 tablet (500 mg) by mouth daily    Gastroesophageal reflux disease without esophagitis       cyclobenzaprine 5 MG tablet    FLEXERIL    42 tablet    Take 1 tablet (5 mg) by mouth nightly as needed    Acute right-sided low back pain with right-sided sciatica       diclofenac 1 % Gel topical gel    VOLTAREN    100 g    Apply 4 grams to knees or 2 grams to hands four times daily using enclosed dosing card.    Acute right-sided low back pain with right-sided sciatica       guaiFENesin-dextromethorphan 100-10 MG/5ML syrup    ROBITUSSIN DM    560 mL    Take 5 mLs by mouth every 4 hours as needed for cough    Cough       ibuprofen 600 MG tablet    ADVIL/MOTRIN    30 tablet    Take 1 tablet (600 mg) by mouth every 6 hours as needed for moderate pain    Acute right-sided low back pain with right-sided sciatica       order for DME     1 Device    Equipment being ordered: heating pad    Acute right-sided low back pain with right-sided sciatica       ranitidine 150 MG tablet    ZANTAC    60 tablet    Take 1 tablet (150 mg) by mouth 2 times daily    Gastroesophageal reflux disease without esophagitis       * Notice:  This list has 2  medication(s) that are the same as other medications prescribed for you. Read the directions carefully, and ask your doctor or other care provider to review them with you.

## 2018-08-28 ENCOUNTER — THERAPY VISIT (OUTPATIENT)
Dept: PHYSICAL THERAPY | Facility: CLINIC | Age: 33
End: 2018-08-28
Payer: MEDICAID

## 2018-08-28 DIAGNOSIS — M54.41 BILATERAL LOW BACK PAIN WITH RIGHT-SIDED SCIATICA: ICD-10-CM

## 2018-08-28 PROCEDURE — 97110 THERAPEUTIC EXERCISES: CPT | Mod: GP | Performed by: PHYSICAL THERAPIST

## 2018-08-28 PROCEDURE — T1013 SIGN LANG/ORAL INTERPRETER: HCPCS | Mod: U3 | Performed by: PHYSICAL THERAPIST

## 2018-08-28 PROCEDURE — 97140 MANUAL THERAPY 1/> REGIONS: CPT | Mod: GP | Performed by: PHYSICAL THERAPIST

## 2018-08-29 ENCOUNTER — OFFICE VISIT (OUTPATIENT)
Dept: FAMILY MEDICINE | Facility: CLINIC | Age: 33
End: 2018-08-29
Payer: MEDICAID

## 2018-08-29 VITALS
TEMPERATURE: 98.3 F | WEIGHT: 165.6 LBS | BODY MASS INDEX: 30.47 KG/M2 | DIASTOLIC BLOOD PRESSURE: 72 MMHG | RESPIRATION RATE: 16 BRPM | HEART RATE: 63 BPM | SYSTOLIC BLOOD PRESSURE: 120 MMHG | HEIGHT: 62 IN | OXYGEN SATURATION: 100 %

## 2018-08-29 DIAGNOSIS — G89.29 CHRONIC BILATERAL LOW BACK PAIN WITH RIGHT-SIDED SCIATICA: Primary | ICD-10-CM

## 2018-08-29 DIAGNOSIS — M99.9 NONALLOPATHIC LESION OF THORACIC REGION: ICD-10-CM

## 2018-08-29 DIAGNOSIS — M99.9 NONALLOPATHIC LESION OF SACRAL REGION: ICD-10-CM

## 2018-08-29 DIAGNOSIS — M99.9 NONALLOPATHIC LESION OF CERVICAL REGION: ICD-10-CM

## 2018-08-29 DIAGNOSIS — M99.00 SOMATIC DYSFUNCTION OF HEAD REGION: ICD-10-CM

## 2018-08-29 DIAGNOSIS — M99.9 NONALLOPATHIC LESION OF LUMBAR REGION: ICD-10-CM

## 2018-08-29 DIAGNOSIS — M54.41 CHRONIC BILATERAL LOW BACK PAIN WITH RIGHT-SIDED SCIATICA: Primary | ICD-10-CM

## 2018-08-29 DIAGNOSIS — M99.9 NONALLOPATHIC LESION OF LOWER EXTREMITIES: ICD-10-CM

## 2018-08-29 RX ORDER — ACETAMINOPHEN 500 MG
1000 TABLET ORAL 3 TIMES DAILY
Qty: 100 TABLET | Refills: 0 | Status: SHIPPED | OUTPATIENT
Start: 2018-08-29 | End: 2019-03-15

## 2018-08-29 NOTE — PROGRESS NOTES
"COMPA Randall is a 33 year old female who presents today for OMT.   Chief Complaint   Patient presents with     RECHECK     OMT     LBP: Reffered to OMT by Dr. Tse. Bilateral low back pain with sciatica to left foot. The leg is the worse in the right lower leg. Describes the pain as burning and shooting. Pain has been present for the past 2 months. Currently doing physical therapy. Progression of pain is worsening. Pain worsens with walking and sitting. Takes tylenol 2 tablets per day that helps. Takes ibuprofen and uses voltaren gel with minimal relief. Taking ibuprofen twice daily. No trauma. Rates pain as 9/10.     [employer]  was used for this visit.     All active medical problems, med list, PMHx, and social Hx updated and reviewed.    No Known Allergies  Review of Systems     Review of Systems   Constitutional: Negative for fever.   Respiratory: Negative for shortness of breath.    Cardiovascular: Negative for chest pain.   Musculoskeletal: Positive for back pain.   Neurological: Negative for headaches.        Physical Exam     Vitals:    08/29/18 1025   BP: 120/72   Pulse: 63   Resp: 16   Temp: 98.3  F (36.8  C)   TempSrc: Oral   SpO2: 100%   Weight: 165 lb 9.6 oz (75.1 kg)   Height: 5' 1.5\" (156.2 cm)     Vitals were reviewed  Physical Exam  General: Alert and oriented, in no acute distress.  Skin: Warm and dry, no abnormalities noted.  Eyes: Extra-ocular muscles intact, pupils equal and reactive.  ENT: Speech intact, nasal passages open, no hearing impairment noted.  Neck: Supple, no swelling noted.  CV: No cyanosis or pallor, warm and well perfused.  Respiratory: No respiratory distress, no accessory muscle use.  Neuro: Antalgic gait, significant difficulty getting onto the table for manipulation, comprehension intact. .   Psychiatric: Mood and affect appear normal.   Extremities: Warm, able to move all four extremities at will.    Osteopathic Structural Exam found below in OMT Procedure " Note.  Assessment and Plan   Tonia is a 33 year old female  who has several longstanding somatic dysfunctions that interfere with quality of life and/or ADLs. After discussion, we have decided to address these dysfunctions with Osteopathic Manipulative Treatment.    Please see OMT Procedure Note below for the specifics of treatment.  Tonia was seen today for recheck.    Diagnoses and all orders for this visit:    Chronic bilateral low back pain with right-sided sciatica  -     [facility] INTERNAL REFERRAL  -     Lidocaine-Menthol 4-1 % CREA; Externally apply 1 Application topically 3 times daily as needed  -     acetaminophen (TYLENOL) 500 MG tablet; Take 2 tablets (1,000 mg) by mouth 3 times daily  -     OSTEOPATHIC MANIP,5-6 BODY REGN    Somatic dysfunction of head region  -     OSTEOPATHIC MANIP,5-6 BODY REGN    Nonallopathic lesion of cervical region  -     OSTEOPATHIC MANIP,5-6 BODY REGN    Nonallopathic lesion of thoracic region  -     OSTEOPATHIC MANIP,5-6 BODY REGN    Nonallopathic lesion of lumbar region  -     OSTEOPATHIC MANIP,5-6 BODY REGN    Nonallopathic lesion of sacral region  -     OSTEOPATHIC MANIP,5-6 BODY REGN    Nonallopathic lesion of lower extremities  -     OSTEOPATHIC MANIP,5-6 BODY REGN      Dayna Franklin, DO     OMT PROCEDURE NOTE    Body Region: Head, Face, and Jaw  Somatic Dysfunction: Occipital hypertonicity, left OA restriction   Treatment: Direct Myofascial Release Techniques.  Outcome: Improved    Body Region: C-spine and Neck  Somatic Dysfunction: C2-C5 ERlSl  Treatment: Direct Myofascial Release and Direct Muscle Energy Techniques.  Outcome: Improved    Body Region: T-spine, Ribs, and Shoulder   Somatic Dysfunction: T2-T4 NRlSr, T7-T9 NRrSl  Treatment: Direct Myofascial Release and Soft Tissue Techniques.   Outcome: Improved    Body Region: L-spine, Sacrum, Pelvis, and Innominates  Somatic Dysfunction: L1-L3 NRrSl, R sacral tenderpoint  Treatment: Direct  Myofascial Release and Counterstrain Techniques.   Outcome: Improved    Body Region: LE and Feet  Somatic Dysfunction: Right quadriceps tenderpoint and hypertonicity of gastrocnemius   Treatment: Direct Myofascial Release and Articulatory Techniques.   Outcome: Improved    The patient actively participated in OMT and was able to communicate both positive and negative feedback throughout. OMT completed without incident. Patient tolerated treatment well. Patient reported that ROM, function, and/or pain level were improved. Advised that pain is occasionally worse during the first 24 hours after treatment and that drinking more water and taking Tylenol or Ibuprofen often help. Patient to return in 2-4 week/s or as needed for repeat OMT.     OMT procedure performed by Dayna Franklin DO.   Dr. Patrick was present for comer portions of procedure.

## 2018-08-29 NOTE — MR AVS SNAPSHOT
After Visit Summary   8/29/2018    Tonia Smith    MRN: [id number]           Patient Information     Date Of Birth          1985        Visit Information        Provider Department      8/29/2018 10:20 AM Keron Adam MD [facility]        Today's Diagnoses     Chronic bilateral low back pain with right-sided sciatica    -  1      Care Instructions    Here is the plan from today's visit    1. Chronic bilateral low back pain with right-sided sciatica  - [facility] INTERNAL REFERRAL  - Lidocaine-Menthol 4-1 % CREA; Externally apply 1 Application topically 3 times daily as needed  Dispense: 120 g; Refill: 0  - acetaminophen (TYLENOL) 500 MG tablet; Take 2 tablets (1,000 mg) by mouth 3 times daily  Dispense: 100 tablet; Refill: 0    Please call or return to clinic if your symptoms don't go away.    Follow up plan  Please make a clinic appointment for follow up with me (KERON ADAM) in 2 weeks for OMT.      Thank you for coming to [facility] today.  Lab Testing:  **If you had lab testing today and your results are reassuring or normal they will be mailed to you or sent through [employer] within 7 days.   **If the lab tests need quick action we will call you with the results.  The phone number we will call with results is # [phone] (home) . If this is not the best number please call our clinic and change the number.  Medication Refills:  If you need any refills please call your pharmacy and they will contact us.   If you need to  your refill at a new pharmacy, please contact the new pharmacy directly. The new pharmacy will help you get your medications transferred faster.   Scheduling:  If you have any concerns about today's visit or wish to schedule another appointment please call our office during normal business hours [phone] (8-5:00 M-F)  If a referral was made to a [facility] and you don't get a  call from central scheduling please call [phone].  If a Mammogram was ordered for you at [facility] call [phone] to schedule or change your appointment.  If you had an XRay/CT/Ultrasound/MRI ordered the number is [phone] to schedule or change your radiology appointment.   Medical Concerns:  If you have urgent medical concerns please call [phone] at any time of the day.    Dayna Franklin MD            Follow-ups after your visit        Additional Services     [facility] INTERNAL REFERRAL       Reason: chronic low back pain  Urgency of Appointment: Next Available  Length of Problem: Acute (0-3 weeks since onset): Ordering Provider - please ensure that x-rays are obtained if concern for bone or joint.  What are you requesting be done? Management Recommendations                  Your next 10 appointments already scheduled     Aug 30, 2018 10:45 AM CDT   [facility] with Chris Joyce PT   [facility] [place] ([facility] [place])    [address] [place] [address] [phone]            Sep 04, 2018  9:30 AM CDT   [facility] Sp[facility] with Chris Joyce PT   [facility] [place] ([facility] [place])    [address] [facility] [address] [phone]            Sep 06, 2018  9:30 AM CDT   [facility] with Chris Joyce PT   [facility] [place] ([facility] [place])    [address] [place] [address] [phone]              Who to contact     Please call your clinic at [phone] to:    Ask questions about your health    Make or cancel appointments    Discuss your medicines    Learn about your test results    Speak to your doctor            Additional Information About Your Visit        Care EveryWhere ID     This is your Care EveryWhere ID. This could be used by other organizations to access your [facility] medical records  [id number]        Your Vitals Were     Pulse Temperature  "Respirations Height Pulse Oximetry Breastfeeding?    63 98.3  F (36.8  C) (Oral) 16 5' 1.5\" (156.2 cm) 100% No    BMI (Body Mass Index)                   30.78 kg/m2            Blood Pressure from Last 3 Encounters:   08/29/18 120/72   08/21/18 132/90   05/02/18 115/77    Weight from Last 3 Encounters:   08/29/18 165 lb 9.6 oz (75.1 kg)   08/21/18 166 lb 6.4 oz (75.5 kg)   05/02/18 165 lb (74.8 kg)              We Performed the Following     [facility] INTERNAL REFERRAL          Today's Medication Changes          These changes are accurate as of 8/29/18 10:55 AM.  If you have any questions, ask your nurse or doctor.               Start taking these medicines.        Dose/Directions    Lidocaine-Menthol 4-1 % Crea   Used for:  Chronic bilateral low back pain with right-sided sciatica   Started by:  Dayna Franklin MD        Dose:  1 Application   Externally apply 1 Application topically 3 times daily as needed   Quantity:  120 g   Refills:  0         These medicines have changed or have updated prescriptions.        Dose/Directions    * acetaminophen 325 MG tablet   Commonly known as:  TYLENOL   This may have changed:  Another medication with the same name was changed. Make sure you understand how and when to take each.   Used for:  Acute right-sided low back pain with right-sided sciatica   Changed by:  Dayna Franklin MD        Dose:  325-650 mg   Take 1-2 tablets (325-650 mg) by mouth every 4 hours as needed for mild pain   Quantity:  100 tablet   Refills:  0       * acetaminophen 500 MG tablet   Commonly known as:  TYLENOL   This may have changed:    - how much to take  - when to take this  - reasons to take this   Used for:  Chronic bilateral low back pain with right-sided sciatica   Changed by:  Dayna Franklin MD        Dose:  1000 mg   Take 2 tablets (1,000 mg) by mouth 3 times daily   Quantity:  100 tablet   Refills:  0       * Notice:  This list has 2 medication(s) that " are the same as other medications prescribed for you. Read the directions carefully, and ask your doctor or other care provider to review them with you.      Stop taking these medicines if you haven't already. Please contact your care team if you have questions.     calcium carbonate 500 MG chewable tablet   Commonly known as:  TUMS   Stopped by:  Dayna Franklin MD           guaiFENesin-dextromethorphan 100-10 MG/5ML syrup   Commonly known as:  ROBITUSSIN DM   Stopped by:  Dayna Franklin MD           ranitidine 150 MG tablet   Commonly known as:  ZANTAC   Stopped by:  Dayna Franklin MD                Where to get your medicines      These medications were sent to [facility] [place], [place] - [address] [facility] [address]     Phone:  [phone]     acetaminophen 500 MG tablet    Lidocaine-Menthol 4-1 % Crea                Primary Care Provider Office Phone # Fax #    Baylee MD Radhika [phone]       [facility] [address]   [facility] [address]        Equal Access to Services     [facility]: Hadii aad ku hadasho Soomaali, waaxda luqadaha, qaybta kaalmada adeegyayesica, britney simons . So [facility] [place] [phone].    ATENCIÓN: Si habla español, tiene a gordillo disposición servicios gratuitos de asistencia lingüística. Carissa al [phone].    We comply with applicable federal civil rights laws and [place] laws. We do not discriminate on the basis of race, color, national origin, age, disability, sex, sexual orientation, or gender identity.            Thank you!     Thank you for choosing [facility]  for your care. Our goal is always to provide you with excellent care. Hearing back from our patients is one way we can continue to improve our services. Please take a few minutes to complete the written survey that you may receive in the mail after your visit with us. Thank you!              Your Updated Medication List - Protect others around you: Learn how to safely use, store and throw away your medicines at www.disposemymeds.org.          This list is accurate as of 8/29/18 10:55 AM.  Always use your most recent med list.                   Brand Name Dispense Instructions for use Diagnosis    * acetaminophen 325 MG tablet    TYLENOL    100 tablet    Take 1-2 tablets (325-650 mg) by mouth every 4 hours as needed for mild pain    Acute right-sided low back pain with right-sided sciatica       * acetaminophen 500 MG tablet    TYLENOL    100 tablet    Take 2 tablets (1,000 mg) by mouth 3 times daily    Chronic bilateral low back pain with right-sided sciatica       cyclobenzaprine 5 MG tablet    FLEXERIL    42 tablet    Take 1 tablet (5 mg) by mouth nightly as needed    Acute right-sided low back pain with right-sided sciatica       diclofenac 1 % Gel topical gel    VOLTAREN    100 g    Apply 4 grams to knees or 2 grams to hands four times daily using enclosed dosing card.    Acute right-sided low back pain with right-sided sciatica       ibuprofen 600 MG tablet    ADVIL/MOTRIN    30 tablet    Take 1 tablet (600 mg) by mouth every 6 hours as needed for moderate pain    Acute right-sided low back pain with right-sided sciatica       Lidocaine-Menthol 4-1 % Crea     120 g    Externally apply 1 Application topically 3 times daily as needed    Chronic bilateral low back pain with right-sided sciatica       order for DME     1 Device    Equipment being ordered: heating pad    Acute right-sided low back pain with right-sided sciatica       * Notice:  This list has 2 medication(s) that are the same as other medications prescribed for you. Read the directions carefully, and ask your doctor or other care provider to review them with you.

## 2018-08-29 NOTE — NURSING NOTE
Due to patient being non-English speaking/uses sign language, an  was used for this visit. Only for face-to-face interpretation by an external agency, date and length of interpretation can be found on the scanned worksheet.     name: Yuli MISHRA  Agency: [employer]  Language: [facility]   Telephone number: [phone]  Type of interpretation: Face-to-face, spoken

## 2018-08-29 NOTE — PATIENT INSTRUCTIONS
Here is the plan from today's visit    1. Chronic bilateral low back pain with right-sided sciatica  - [facility] INTERNAL REFERRAL  - Lidocaine-Menthol 4-1 % CREA; Externally apply 1 Application topically 3 times daily as needed  Dispense: 120 g; Refill: 0  - acetaminophen (TYLENOL) 500 MG tablet; Take 2 tablets (1,000 mg) by mouth 3 times daily  Dispense: 100 tablet; Refill: 0    Please call or return to clinic if your symptoms don't go away.    Follow up plan  Please make a clinic appointment for follow up with me (KERON ADAM) in 2 weeks for OMT.      Thank you for coming to [facility] today.  Lab Testing:  **If you had lab testing today and your results are reassuring or normal they will be mailed to you or sent through [employer] within 7 days.   **If the lab tests need quick action we will call you with the results.  The phone number we will call with results is # [phone] (home) . If this is not the best number please call our clinic and change the number.  Medication Refills:  If you need any refills please call your pharmacy and they will contact us.   If you need to  your refill at a new pharmacy, please contact the new pharmacy directly. The new pharmacy will help you get your medications transferred faster.   Scheduling:  If you have any concerns about today's visit or wish to schedule another appointment please call our office during normal business hours [phone] (8-5:00 M-F)  If a referral was made to a [facility] and you don't get a call from central scheduling please call [phone].  If a Mammogram was ordered for you at [facility] call [phone] to schedule or change your appointment.  If you had an XRay/CT/Ultrasound/MRI ordered the number is [phone] to schedule or change your radiology appointment.   Medical Concerns:  If you have urgent medical concerns please call [phone] at any time of the  day.    Dayna Franklin MD

## 2018-09-01 ASSESSMENT — ENCOUNTER SYMPTOMS
BACK PAIN: 1
SHORTNESS OF BREATH: 0
FEVER: 0
HEADACHES: 0

## 2018-09-04 ENCOUNTER — RADIANT APPOINTMENT (OUTPATIENT)
Dept: GENERAL RADIOLOGY | Facility: CLINIC | Age: 33
End: 2018-09-04
Attending: FAMILY MEDICINE
Payer: COMMERCIAL

## 2018-09-04 ENCOUNTER — OFFICE VISIT (OUTPATIENT)
Dept: FAMILY MEDICINE | Facility: CLINIC | Age: 33
End: 2018-09-04
Payer: COMMERCIAL

## 2018-09-04 VITALS
BODY MASS INDEX: 30.49 KG/M2 | OXYGEN SATURATION: 98 % | TEMPERATURE: 98 F | WEIGHT: 164 LBS | SYSTOLIC BLOOD PRESSURE: 134 MMHG | HEART RATE: 82 BPM | RESPIRATION RATE: 16 BRPM | DIASTOLIC BLOOD PRESSURE: 85 MMHG

## 2018-09-04 DIAGNOSIS — G89.29 CHRONIC RIGHT-SIDED LOW BACK PAIN WITH RIGHT-SIDED SCIATICA: Primary | ICD-10-CM

## 2018-09-04 DIAGNOSIS — M54.41 CHRONIC RIGHT-SIDED LOW BACK PAIN WITH RIGHT-SIDED SCIATICA: Primary | ICD-10-CM

## 2018-09-04 DIAGNOSIS — G89.29 CHRONIC RIGHT-SIDED LOW BACK PAIN WITH RIGHT-SIDED SCIATICA: ICD-10-CM

## 2018-09-04 DIAGNOSIS — M54.41 CHRONIC RIGHT-SIDED LOW BACK PAIN WITH RIGHT-SIDED SCIATICA: ICD-10-CM

## 2018-09-04 RX ORDER — TRAMADOL HYDROCHLORIDE 50 MG/1
50 TABLET ORAL EVERY 6 HOURS PRN
Qty: 20 TABLET | Refills: 0 | Status: SHIPPED | OUTPATIENT
Start: 2018-09-04 | End: 2018-09-07

## 2018-09-04 NOTE — PROGRESS NOTES
SUBJECTIVE: Tonia Smith is a 33 year old female who reports she's having pain on the right side of her back.  Pt reports no MVA, no falls, the pain just came on.  Hard time lifting herself, daughter, hard time with house work.  Shooting pain and itching type feeling.  Sitting is difficult.  Can't sleep well, shooting pains.  NSAIDs and Tylenol aren't helping.  Had back pain last year, same situation.  This pain is different from the year before.      PAST MEDICAL, SOCIAL, SURGICAL AND FAMILY HISTORY: She  has a past medical history of Hypertension and Preeclampsia. She also has no past medical history of Anemia; Asymptomatic human immunodeficiency virus (HIV) infection status (H); Breast disorder; Chronic kidney disease; Complication of anesthesia; Diabetes (H); previous reproductive problem; Liver disease; Mental disorder; Postpartum depression; Rh incompatibility; Seizures (H); Sickle cell anemia (H); Systemic lupus erythematosus (H); Thyroid disease; or Uncomplicated asthma.  She  has a past surgical history that includes  section (10/5/2013); GYN surgery; and  section (N/A, 2016).  Her family history includes Family History Negative in an other family member.  She reports that she has never smoked. She has never used smokeless tobacco. She reports that she does not drink alcohol or use illicit drugs.      ALLERGIES: She has No Known Allergies.    CURRENT MEDICATIONS: She has a current medication list which includes the following prescription(s): acetaminophen, ibuprofen, cyclobenzaprine, diclofenac, lidocaine-menthol, and order for dme.     REVIEW OF SYSTEMS: 10 point review of systems is negative except as noted above.    EXAM:  /85  Pulse 82  Temp 98  F (36.7  C) (Oral)  Resp 16  Wt 164 lb (74.4 kg)  LMP 2018 (Exact Date)  SpO2 98%  BMI 30.49 kg/m2  CONSTITUTIONIAL: alert, moderate distress, cooperative and obese  HEAD: Normocephalic. No masses, lesions, tenderness or  abnormalities  ENT: ENT exam normal, no neck nodes or sinus tenderness  SKIN: no suspicious lesions or rashes  GAIT: antalgic  Stance: normal  NEUROLOGIC: Normal muscle tone and strength, reflexes normal, sensation grossly normal.  PSYCHIATRIC: affect normal/bright and mentation appears normal.    MUSCULOSKELETAL: lumbar, right leg symptoms  Tender:  right para lumbar muscles  Non-tender:  thoracic spinous processes, left parathoracic muscles, right parathoracic muscles, left para lumbar muscles, left SI joint, right SI joint  Range of Motion:  lumbar flexion  decreased, lumbar extension  decreased  Strength:  unable to heel walk, unable to toe walk  Special tests:  positive right straight leg raise    Hip Exam: Hip ROM full        ASSESSMENT/PLAN:  Pt is a 32 yo AA female with PMHx of low back pain presenting with chronic low back pain with right leg radiculopathy  1. Chronic LBP with right leg radiculopathy-  MRI lumbar- RTC after imaging performed  [facility] for PT- strengthening  Tramadol for pain, pt reports flexeril wasn't helpful  Ice, heat      X-RAY INTERPRETATION:   X-Ray of the Lumbar: 2-view, ap, lateral  ordered and interpreted in the office today was negative for fracture, subluxation or joint space abnormality.

## 2018-09-04 NOTE — MR AVS SNAPSHOT
After Visit Summary   9/4/2018    Tonia Smith    MRN: [id number]           Patient Information     Date Of Birth          1985        Visit Information        Provider Department      9/4/2018 10:40 AM Arianne Montesinos MD [facility]        Today's Diagnoses     Chronic right-sided low back pain with right-sided sciatica    -  1       Follow-ups after your visit        Additional Services     [facility], PT, HAND AND CHIROPRACTIC REFERRAL - [facility]       **This order will print in the [facility] Scheduling Office**    Physical Therapy, Hand Therapy and Chiropractic Care are available through:    *[facility]  *[facility]  *[facility]    Call one number to schedule at any of the above locations: [phone].    Your provider has referred you to: Physical Therapy at [facility] or [facility]    Indication/Reason for Referral: Low Back Pain  Onset of Illness: months  Therapy Orders: Evaluate and Treat  Special Programs: None  Special Request: None    Rosmery Botello      Additional Comments for the Therapist or Chiropractor: LBP in the past but right leg symptoms are new    Please be aware that coverage of these services is subject to the terms and limitations of your health insurance plan.  Call member services at your health plan with any benefit or coverage questions.      Please bring the following to your appointment:    *Your personal calendar for scheduling future appointments  *Comfortable clothing                  Follow-up notes from your care team     Return in about 2 weeks (around 9/18/2018), or if symptoms worsen or fail to improve.      Your next 10 appointments already scheduled     Sep 05, 2018 10:40 AM CDT   Return Visit with [facility] ([facility])    [address] [place] [address],  [address] [place] [id number]   [phone]            Sep 06, 2018  9:15 AM CDT   [facility] Spine  with Chris Joyce PT   [facility] ([facility] [place])    [address] [place] [id number] [phone]              Future tests that were ordered for you today     Open Future Orders        Priority Expected Expires Ordered    MR Lumbar Spine w/o Contrast Routine  9/4/2019 9/4/2018            Who to contact     Please call your clinic at [phone] to:    Ask questions about your health    Make or cancel appointments    Discuss your medicines    Learn about your test results    Speak to your doctor            Additional Information About Your Visit        Care EveryWhere ID     This is your Care EveryWhere ID. This could be used by other organizations to access your [place] medical records  [id number]        Your Vitals Were     Pulse Temperature Respirations Last Period Pulse Oximetry BMI (Body Mass Index)    82 98  F (36.7  C) (Oral) 16 08/27/2018 (Exact Date) 98% 30.49 kg/m2       Blood Pressure from Last 3 Encounters:   09/04/18 134/85   08/29/18 120/72   08/21/18 132/90    Weight from Last 3 Encounters:   09/04/18 164 lb (74.4 kg)   08/29/18 165 lb 9.6 oz (75.1 kg)   08/21/18 166 lb 6.4 oz (75.5 kg)              We Performed the Following     [facility]IAN, PT, HAND AND CHIROPRACTIC REFERRAL - [facility]          Today's Medication Changes          These changes are accurate as of 9/4/18 12:13 PM.  If you have any questions, ask your nurse or doctor.               Start taking these medicines.        Dose/Directions    traMADol 50 MG tablet   Commonly known as:  ULTRAM   Used for:  Chronic right-sided low back pain with right-sided sciatica   Started by:  Arianne Montesinos MD        Dose:  50 mg   Take 1 tablet (50 mg) by mouth every 6 hours as needed for moderate to severe pain or severe pain   Quantity:  20 tablet   Refills:  0            Where to get your medicines      Some of these will need a paper prescription and others can be bought over the counter.  Ask your  nurse if you have questions.     Bring a paper prescription for each of these medications     traMADol 50 MG tablet               Information about OPIOIDS     PRESCRIPTION OPIOIDS: WHAT YOU NEED TO KNOW   We gave you an opioid (narcotic) pain medicine. It is important to manage your pain, but opioids are not always the best choice. You should first try all the other options your care team gave you. Take this medicine for as short a time (and as few doses) as possible.    Some activities can increase your pain, such as bandage changes or therapy sessions. It may help to take your pain medicine 30 to 60 minutes before these activities. Reduce your stress by getting enough sleep, working on hobbies you enjoy and practicing relaxation or meditation. Talk to your care team about ways to manage your pain beyond prescription opioids.    These medicines have risks:    DO NOT drive when on new or higher doses of pain medicine. These medicines can affect your alertness and reaction times, and you could be arrested for driving under the influence (DUI). If you need to use opioids long-term, talk to your care team about driving.    DO NOT operate heavy machinery    DO NOT do any other dangerous activities while taking these medicines.    DO NOT drink any alcohol while taking these medicines.     If the opioid prescribed includes acetaminophen, DO NOT take with any other medicines that contain acetaminophen. Read all labels carefully. Look for the word  acetaminophen  or  Tylenol.  Ask your pharmacist if you have questions or are unsure.    You can get addicted to pain medicines, especially if you have a history of addiction (chemical, alcohol or substance dependence). Talk to your care team about ways to reduce this risk.    All opioids tend to cause constipation. Drink plenty of water and eat foods that have a lot of fiber, such as fruits, vegetables, prune juice, apple juice and high-fiber cereal. Take a laxative (Miralax,  milk of magnesia, Colace, Senna) if you don t move your bowels at least every other day. Other side effects include upset stomach, sleepiness, dizziness, throwing up, tolerance (needing more of the medicine to have the same effect), physical dependence and slowed breathing.    Store your pills in a secure place, locked if possible. We will not replace any lost or stolen medicine. If you don t finish your medicine, please throw away (dispose) as directed by your pharmacist. The [place] Pollution Control Agency has more information about safe disposal: [email or web address]/[email or web address]-medications         Primary Care Provider Office Phone # Fax #    Baylee Garrido [phone]       [facility] [address]   [facility] [address]        Equal Access to Services     ESPERANZA PEÑA : Serena Nolen, wamichael camacho, siva hughesalnancy gustafson, britney simons . So [facility] [phone].    ATENCIÓN: Si habla español, tiene a gordillo disposición servicios gratuitos de asistencia lingüística. Carissa al [phone].    We comply with applicable federal civil rights laws and [place] laws. We do not discriminate on the basis of race, color, national origin, age, disability, sex, sexual orientation, or gender identity.            Thank you!     Thank you for choosing [facility]  for your care. Our goal is always to provide you with excellent care. Hearing back from our patients is one way we can continue to improve our services. Please take a few minutes to complete the written survey that you may receive in the mail after your visit with us. Thank you!             Your Updated Medication List - Protect others around you: Learn how to safely use, store and throw away your medicines at www.disposemymeds.org.          This list is accurate as of 9/4/18 12:13 PM.  Always use your most recent med list.                    Brand Name Dispense Instructions for use Diagnosis    acetaminophen 500 MG tablet    TYLENOL    100 tablet    Take 2 tablets (1,000 mg) by mouth 3 times daily    Chronic bilateral low back pain with right-sided sciatica       cyclobenzaprine 5 MG tablet    FLEXERIL    42 tablet    Take 1 tablet (5 mg) by mouth nightly as needed    Acute right-sided low back pain with right-sided sciatica       diclofenac 1 % Gel topical gel    VOLTAREN    100 g    Apply 4 grams to knees or 2 grams to hands four times daily using enclosed dosing card.    Acute right-sided low back pain with right-sided sciatica       ibuprofen 600 MG tablet    ADVIL/MOTRIN    30 tablet    Take 1 tablet (600 mg) by mouth every 6 hours as needed for moderate pain    Acute right-sided low back pain with right-sided sciatica       Lidocaine-Menthol 4-1 % Crea     120 g    Externally apply 1 Application topically 3 times daily as needed    Chronic bilateral low back pain with right-sided sciatica       order for DME     1 Device    Equipment being ordered: heating pad    Acute right-sided low back pain with right-sided sciatica       traMADol 50 MG tablet    ULTRAM    20 tablet    Take 1 tablet (50 mg) by mouth every 6 hours as needed for moderate to severe pain or severe pain    Chronic right-sided low back pain with right-sided sciatica

## 2018-09-04 NOTE — NURSING NOTE
Due to patient being non-English speaking/uses sign language, an  was used for this visit. Only for face-to-face interpretation by an external agency, date and length of interpretation can be found on the scanned worksheet.     name: Yuli Espinal  Agency: [employer]  Language: [facility]   Telephone number: [phone]  Type of interpretation: Face-to-face, spoken     iLsy Carbone CMA

## 2018-09-05 NOTE — PROGRESS NOTES
Preceptor Attestation:  I was present for key portions of the OMT procedure.  Patient seen and discussed with the resident. Assessment and plan reviewed with resident and agreed upon.   Supervising Physician:  [facility]

## 2018-09-06 ENCOUNTER — THERAPY VISIT (OUTPATIENT)
Dept: PHYSICAL THERAPY | Facility: CLINIC | Age: 33
End: 2018-09-06
Payer: COMMERCIAL

## 2018-09-06 DIAGNOSIS — M54.41 CHRONIC BILATERAL LOW BACK PAIN WITH RIGHT-SIDED SCIATICA: ICD-10-CM

## 2018-09-06 DIAGNOSIS — G89.29 CHRONIC BILATERAL LOW BACK PAIN WITH RIGHT-SIDED SCIATICA: ICD-10-CM

## 2018-09-06 PROCEDURE — 97110 THERAPEUTIC EXERCISES: CPT | Mod: GP | Performed by: PHYSICAL THERAPIST

## 2018-09-06 PROCEDURE — 97140 MANUAL THERAPY 1/> REGIONS: CPT | Mod: GP | Performed by: PHYSICAL THERAPIST

## 2018-09-06 PROCEDURE — 97112 NEUROMUSCULAR REEDUCATION: CPT | Mod: GP | Performed by: PHYSICAL THERAPIST

## 2018-09-06 PROCEDURE — T1013 SIGN LANG/ORAL INTERPRETER: HCPCS | Mod: U3 | Performed by: PHYSICAL THERAPIST

## 2018-09-07 ENCOUNTER — OFFICE VISIT (OUTPATIENT)
Dept: FAMILY MEDICINE | Facility: CLINIC | Age: 33
End: 2018-09-07
Payer: COMMERCIAL

## 2018-09-07 VITALS
BODY MASS INDEX: 30.34 KG/M2 | DIASTOLIC BLOOD PRESSURE: 87 MMHG | WEIGHT: 163.2 LBS | RESPIRATION RATE: 20 BRPM | SYSTOLIC BLOOD PRESSURE: 127 MMHG | TEMPERATURE: 98.1 F | HEART RATE: 89 BPM | OXYGEN SATURATION: 99 %

## 2018-09-07 DIAGNOSIS — M99.9 NONALLOPATHIC LESION OF LUMBAR REGION: ICD-10-CM

## 2018-09-07 DIAGNOSIS — M99.9 NONALLOPATHIC LESION OF CERVICAL REGION: ICD-10-CM

## 2018-09-07 DIAGNOSIS — M99.9 NONALLOPATHIC LESION OF SACRAL REGION: ICD-10-CM

## 2018-09-07 DIAGNOSIS — M99.9 NONALLOPATHIC LESION OF LOWER EXTREMITIES: ICD-10-CM

## 2018-09-07 DIAGNOSIS — M99.9 NONALLOPATHIC LESION OF THORACIC REGION: ICD-10-CM

## 2018-09-07 DIAGNOSIS — M54.41 CHRONIC BILATERAL LOW BACK PAIN WITH RIGHT-SIDED SCIATICA: Primary | ICD-10-CM

## 2018-09-07 DIAGNOSIS — M99.05 SOMATIC DYSFUNCTION OF PELVIC REGION: ICD-10-CM

## 2018-09-07 DIAGNOSIS — G89.29 CHRONIC BILATERAL LOW BACK PAIN WITH RIGHT-SIDED SCIATICA: Primary | ICD-10-CM

## 2018-09-07 DIAGNOSIS — M99.00 SOMATIC DYSFUNCTION OF HEAD REGION: ICD-10-CM

## 2018-09-07 RX ORDER — OXYCODONE HYDROCHLORIDE 5 MG/1
5-10 TABLET ORAL
Qty: 10 TABLET | Refills: 0 | Status: SHIPPED | OUTPATIENT
Start: 2018-09-07 | End: 2018-09-17

## 2018-09-07 ASSESSMENT — ENCOUNTER SYMPTOMS
HEADACHES: 1
FEVER: 0

## 2018-09-07 NOTE — PROGRESS NOTES
Preceptor Attestation:   Patient seen, evaluated and discussed with the resident. I was present for and supervised the key portions of OMT. I have verified the content of the note, which accurately reflects my assessment of the patient and the plan of care.   Supervising Physician:  Fredy Nobles MD

## 2018-09-07 NOTE — PROGRESS NOTES
COMPA Randall is a 33 year old female who presents today for OMT.   Chief Complaint   Patient presents with     RECHECK     OMT-back, hip and leg pain     Seen sports medicine 9/4/18 for chornic low back pain with right leg radicuopathy. Had manipulation last week with relief for first day. Tramadol did not improve the pain. Difficulty sleeping at night due to pain.     An SmashChart in-person interpretor was used.     All active medical problems, med list, PMHx, and social Hx updated and reviewed.    No Known Allergies  Review of Systems     Review of Systems   Constitutional: Negative for fever.   Respiratory: Negative for shortness of breath.    Cardiovascular: Negative for chest pain.   Musculoskeletal: Positive for back pain.   Neurological: Positive for headaches.   Psychiatric/Behavioral: The patient has insomnia.           Physical Exam     Vitals:    09/07/18 0846   BP: 127/87   BP Location: Left arm   Patient Position: Sitting   Cuff Size: Adult Regular   Pulse: 89   Resp: 20   Temp: 98.1  F (36.7  C)   TempSrc: Oral   SpO2: 99%   Weight: 163 lb 3.2 oz (74 kg)     Vitals were reviewed    X-Ray: no fracture or dislocation noted.  Results for orders placed or performed in visit on 09/04/18   X-ray lumbar spine 2-3 views*    Narrative    3 views lumbar spine radiographs 9/4/2018 1:41 PM    History: lumbar pain right sided, right leg radiculpathy; Chronic  right-sided low back pain with right-sided sciatica; Chronic  right-sided low back pain with right-sided sciatica    Comparison: None    Findings:    Standing  AP, lateral including lumbosacral spot film lateral view  views of the lumbar spine were obtained.    5  lumbar type vertebral bodies are assumed for the purpose of this  dictation.    There is no acute osseous abnormality.      Vertebral body height and disc height is relatively preserved. No  substantial facet disease of the lumbar spine.    The visualized bowel gas pattern is non-obstructive.  Pelvic  phleboliths.    Slight limitation in evaluation of sacrum due to obscuring overlying  structures on lateral view.      Impression    Impression:  1.  No acute osseous abnormality.  2.  No substantial degenerative changes.    TARIK MIRANDA MD       Physical Exam  General: Alert and oriented, in no acute distress.  Skin: Warm and dry, no abnormalities noted.  Eyes: Extra-ocular muscles intact  ENT: Speech intact, nasal passages open, no hearing impairment noted.  Neck: Supple, no swelling noted.  CV: No cyanosis or pallor, warm and well perfused.  Respiratory: No respiratory distress, no accessory muscle use.  Psychiatric: Mood and affect appear normal.   Extremities: Warm, able to move all four extremities at will.  Osteopathic Structural Exam found below in OMT Procedure Note.  Assessment and Plan   Tonia is a 33 year old female  who has several longstanding somatic dysfunctions that interfere with quality of life and/or ADLs. After discussion, we have decided to address these dysfunctions with Osteopathic Manipulative Treatment.    Please see OMT Procedure Note below for the specifics of treatment.  Tonia was seen today for recheck.    Diagnoses and all orders for this visit:    Chronic bilateral low back pain with right-sided sciatica  Continue PT and she has a scheduled lumbar MRI next week. Follow up in 2 weeks for repeat OMT.   -     oxyCODONE IR (ROXICODONE) 5 MG tablet; Take 1-2 tablets (5-10 mg) by mouth nightly as needed for severe pain (10 tablets)  -     OSTEOPATHIC MANIP,7-8 BODY REGN    Somatic dysfunction of head region  -     OSTEOPATHIC MANIP,7-8 BODY REGN    Nonallopathic lesion of cervical region  -     OSTEOPATHIC MANIP,7-8 BODY REGN    Nonallopathic lesion of thoracic region  -     OSTEOPATHIC MANIP,7-8 BODY REGN    Nonallopathic lesion of lumbar region  -     OSTEOPATHIC MANIP,7-8 BODY REGN    Nonallopathic lesion of sacral region  -     OSTEOPATHIC MANIP,7-8 BODY REGN    Somatic  dysfunction of pelvic region  -     OSTEOPATHIC MANIP,7-8 BODY REGN    Nonallopathic lesion of lower extremities  -     OSTEOPATHIC MANIP,7-8 BODY REGN      Dayna Franklin DO     OMT PROCEDURE NOTE    Body Region: Head, Face, and Jaw  Somatic Dysfunction: Occipital hypertonicity   Treatment: Direct Myofascial Release Techniques.  Outcome: Improved    Body Region: C-spine and Neck  Somatic Dysfunction: C2-C5 ERlSl  Treatment: Direct Myofascial Release and Soft Tissue Techniques.  Outcome: Improved    Body Region: T-spine, Ribs, and Shoulder   Somatic Dysfunction: T5-T10 NRrSl, R First rib inhalation dysfunction   Treatment: Direct Myofascial Release and Soft Tissue Techniques, muscle energy  Outcome: Improved    Body Region: L-spine, Sacrum, Pelvis, and Innominates  Somatic Dysfunction: L1-L3 NRlSr, right anterior innominate, S1 tenderpoint   Treatment: Direct Myofascial Release and Counterstrain Techniques.   Outcome: Improved    Body Region: LE and Feet  Somatic Dysfunction: Right plantar restriction, Right gastrocneminus tenderpoint   Treatment: Soft Tissue Techniques, counterstrain  Outcome: Improved    The patient actively participated in OMT and was able to communicate both positive and negative feedback throughout. OMT completed without incident. Patient tolerated treatment well. Patient reported that ROM, function, and/or pain level were improved. Advised that pain is occasionally worse during the first 24 hours after treatment and that drinking more water and taking Tylenol or Ibuprofen often help. Patient to return in 2-4 week/s or as needed for repeat OMT.     OMT procedure performed by Dr. Noemi DO.   Dr. Nobles was present for comer portions of procedure.

## 2018-09-07 NOTE — PATIENT INSTRUCTIONS
Here is the plan from today's visit    1. Chronic bilateral low back pain with right-sided sciatica  - oxyCODONE IR (ROXICODONE) 5 MG tablet; Take 1-2 tablets (5-10 mg) by mouth nightly as needed for severe pain  Dispense: 10 tablet; Refill: 0    Please call or return to clinic if your symptoms don't go away.    Follow up plan  Please make a clinic appointment for follow up with me (KERON ADAM) in 1-2 weeks.       Thank you for coming to [facility] today.  Lab Testing:  **If you had lab testing today and your results are reassuring or normal they will be mailed to you or sent through [employer] within 7 days.   **If the lab tests need quick action we will call you with the results.  The phone number we will call with results is # [phone] (home) . If this is not the best number please call our clinic and change the number.  Medication Refills:  If you need any refills please call your pharmacy and they will contact us.   If you need to  your refill at a new pharmacy, please contact the new pharmacy directly. The new pharmacy will help you get your medications transferred faster.   Scheduling:  If you have any concerns about today's visit or wish to schedule another appointment please call our office during normal business hours [phone] (8-5:00 M-F)  If a referral was made to a [facility] and you don't get a call from central scheduling please call [phone].  If a Mammogram was ordered for you at [facility] call [phone] to schedule or change your appointment.  If you had an XRay/CT/Ultrasound/MRI ordered the number is [phone] to schedule or change your radiology appointment.   Medical Concerns:  If you have urgent medical concerns please call [phone] at any time of the day.    Keron Adam MD

## 2018-09-07 NOTE — MR AVS SNAPSHOT
After Visit Summary   9/7/2018    Tonia Smith    MRN: [id number]           Patient Information     Date Of Birth          1985        Visit Information        Provider Department      9/7/2018 8:40 AM Keron Adam MD [facility]        Today's Diagnoses     Chronic bilateral low back pain with right-sided sciatica    -  1      Care Instructions    Here is the plan from today's visit    1. Chronic bilateral low back pain with right-sided sciatica  - oxyCODONE IR (ROXICODONE) 5 MG tablet; Take 1-2 tablets (5-10 mg) by mouth nightly as needed for severe pain  Dispense: 10 tablet; Refill: 0    Please call or return to clinic if your symptoms don't go away.    Follow up plan  Please make a clinic appointment for follow up with me (KERON ADAM) in 1-2 weeks.       Thank you for coming to [facility] today.  Lab Testing:  **If you had lab testing today and your results are reassuring or normal they will be mailed to you or sent through [employer] within 7 days.   **If the lab tests need quick action we will call you with the results.  The phone number we will call with results is # [phone] (home) . If this is not the best number please call our clinic and change the number.  Medication Refills:  If you need any refills please call your pharmacy and they will contact us.   If you need to  your refill at a new pharmacy, please contact the new pharmacy directly. The new pharmacy will help you get your medications transferred faster.   Scheduling:  If you have any concerns about today's visit or wish to schedule another appointment please call our office during normal business hours [phone] (8-5:00 M-F)  If a referral was made to a [facility] and you don't get a call from central scheduling please call [phone].  If a Mammogram was ordered for you at [facility] call [phone] to schedule or change your  appointment.  If you had an XRay/CT/Ultrasound/MRI ordered the number is [phone] to schedule or change your radiology appointment.   Medical Concerns:  If you have urgent medical concerns please call [phone] at any time of the day.    Dayna Franklin MD            Follow-ups after your visit        Your next 10 appointments already scheduled     Sep 10, 2018  2:30 PM CDT   FABIAN Spine with Walter Wright PT   [facility] ([facility] [place])    [address] [place] [address] [phone]            Sep 12, 2018 11:00 AM CDT   (Arrive by 10:30 AM)   MR LUMBAR SPINE W/O CONTRAST with URMR2   [facility] [place], MRI ([facility])    [address] [facility] [address] [phone]           Take your medicines as usual, unless your doctor tells you not to. Bring a list of your current medicines to your exam (including vitamins, minerals and over-the-counter drugs). Also bring the results of similar scans you may have had.  Please remove any body piercings and hair extensions before you arrive.  Follow your doctor s orders. If you do not, we may have to postpone your exam.  You may or may not receive IV contrast for this exam pending the discretion of the Radiologist.  You do not need to do anything special to prepare.  The MRI machine uses a strong magnet. Please wear clothes without metal (snaps, zippers). A sweatsuit works well, or we may give you a hospital gown.   **IMPORTANT** THE INSTRUCTIONS BELOW ARE ONLY FOR THOSE PATIENTS WHO HAVE BEEN PRESCRIBED SEDATION OR GENERAL ANESTHESIA DURING THEIR MRI PROCEDURE:  IF YOUR DOCTOR PRESCRIBED ORAL SEDATION (take medicine to help you relax during your exam):   You must get the medicine from your doctor (oral medication) before you arrive. Bring the medicine to the exam. Do not take it at home. You ll be told when to take it upon arriving for your exam.   Arrive one  hour early. Bring someone who can take you home after the test. Your medicine will make you sleepy. After the exam, you may not drive, take a bus or take a taxi by yourself.  IF YOUR DOCTOR PRESCRIBED IV SEDATION:   Arrive one hour early. Bring someone who can take you home after the test. Your medicine will make you sleepy. After the exam, you may not drive, take a bus or take a taxi by yourself.   No eating 6 hours before your exam. You may have clear liquids up until 4 hours before your exam. (Clear liquids include water, clear tea, black coffee and fruit juice without pulp.)  IF YOUR DOCTOR PRESCRIBED ANESTHESIA (be asleep for your exam):   Arrive 1 1/2 hours early. Bring someone who can take you home after the test. You may not drive, take a bus or take a taxi by yourself.   No eating 8 hours before your exam. You may have clear liquids up until 4 hours before your exam. (Clear liquids include water, clear tea, black coffee and fruit juice without pulp.)   You will spend four to five hours in the recovery room.  Please call the Imaging Department at your exam site with any questions.            Sep 13, 2018  4:00 PM CDT   FABIAN Spine with Chris Joyce PT   [facility] [place] ([facility] [place])    [address] [place] [address] [phone]              Who to contact     Please call your clinic at [phone] to:    Ask questions about your health    Make or cancel appointments    Discuss your medicines    Learn about your test results    Speak to your doctor            Additional Information About Your Visit        Care EveryWhere ID     This is your Care EveryWhere ID. This could be used by other organizations to access your [place] medical records  [id number]        Your Vitals Were     Pulse Temperature Respirations Last Period Pulse Oximetry Breastfeeding?    89 98.1  F (36.7  C) (Oral) 20 08/27/2018 (Exact Date) 99% No    BMI (Body Mass Index)                    30.34 kg/m2            Blood Pressure from Last 3 Encounters:   09/07/18 127/87   09/04/18 134/85   08/29/18 120/72    Weight from Last 3 Encounters:   09/07/18 163 lb 3.2 oz (74 kg)   09/04/18 164 lb (74.4 kg)   08/29/18 165 lb 9.6 oz (75.1 kg)              Today, you had the following     No orders found for display         Today's Medication Changes          These changes are accurate as of 9/7/18  9:44 AM.  If you have any questions, ask your nurse or doctor.               Start taking these medicines.        Dose/Directions    oxyCODONE IR 5 MG tablet   Commonly known as:  ROXICODONE   Used for:  Chronic bilateral low back pain with right-sided sciatica   Started by:  Dayna Franklin MD        Dose:  5-10 mg   Take 1-2 tablets (5-10 mg) by mouth nightly as needed for severe pain   Quantity:  10 tablet   Refills:  0         Stop taking these medicines if you haven't already. Please contact your care team if you have questions.     traMADol 50 MG tablet   Commonly known as:  ULTRAM   Stopped by:  Dayna Franklin MD                Where to get your medicines      Some of these will need a paper prescription and others can be bought over the counter.  Ask your nurse if you have questions.     Bring a paper prescription for each of these medications     oxyCODONE IR 5 MG tablet               Information about OPIOIDS     PRESCRIPTION OPIOIDS: WHAT YOU NEED TO KNOW   We gave you an opioid (narcotic) pain medicine. It is important to manage your pain, but opioids are not always the best choice. You should first try all the other options your care team gave you. Take this medicine for as short a time (and as few doses) as possible.    Some activities can increase your pain, such as bandage changes or therapy sessions. It may help to take your pain medicine 30 to 60 minutes before these activities. Reduce your stress by getting enough sleep, working on hobbies you enjoy and practicing relaxation or meditation.  Talk to your care team about ways to manage your pain beyond prescription opioids.    These medicines have risks:    DO NOT drive when on new or higher doses of pain medicine. These medicines can affect your alertness and reaction times, and you could be arrested for driving under the influence (DUI). If you need to use opioids long-term, talk to your care team about driving.    DO NOT operate heavy machinery    DO NOT do any other dangerous activities while taking these medicines.    DO NOT drink any alcohol while taking these medicines.     If the opioid prescribed includes acetaminophen, DO NOT take with any other medicines that contain acetaminophen. Read all labels carefully. Look for the word  acetaminophen  or  Tylenol.  Ask your pharmacist if you have questions or are unsure.    You can get addicted to pain medicines, especially if you have a history of addiction (chemical, alcohol or substance dependence). Talk to your care team about ways to reduce this risk.    All opioids tend to cause constipation. Drink plenty of water and eat foods that have a lot of fiber, such as fruits, vegetables, prune juice, apple juice and high-fiber cereal. Take a laxative (Miralax, milk of magnesia, Colace, Senna) if you don t move your bowels at least every other day. Other side effects include upset stomach, sleepiness, dizziness, throwing up, tolerance (needing more of the medicine to have the same effect), physical dependence and slowed breathing.    Store your pills in a secure place, locked if possible. We will not replace any lost or stolen medicine. If you don t finish your medicine, please throw away (dispose) as directed by your pharmacist. The [place] [employer]ution [employer] has more information about safe disposal: [email or web address]-[email or web address]         Primary Care Provider Office Phone # Fax #    Baylee Garrido [phone]       [facility]  CLINIC [address] [address]        Equal Access to Services     LALITALEIGH MARIANA : Hadii aad ku hadjackelsa Janicejessica, wajoseyesica camacho, qacesarcamron hughesjinayesica gustafson, britney samloveheath clifford. So [facility] [phone].    ATENCIÓN: Si habla español, tiene a gordillo disposición servicios gratuitos de asistencia lingüística. [facility] [phone].    We comply with applicable federal civil rights laws and [place] laws. We do not discriminate on the basis of race, color, national origin, age, disability, sex, sexual orientation, or gender identity.            Thank you!     Thank you for choosing [facility]  for your care. Our goal is always to provide you with excellent care. Hearing back from our patients is one way we can continue to improve our services. Please take a few minutes to complete the written survey that you may receive in the mail after your visit with us. Thank you!             Your Updated Medication List - Protect others around you: Learn how to safely use, store and throw away your medicines at www.disposemymeds.org.          This list is accurate as of 9/7/18  9:44 AM.  Always use your most recent med list.                   Brand Name Dispense Instructions for use Diagnosis    acetaminophen 500 MG tablet    TYLENOL    100 tablet    Take 2 tablets (1,000 mg) by mouth 3 times daily    Chronic bilateral low back pain with right-sided sciatica       cyclobenzaprine 5 MG tablet    FLEXERIL    42 tablet    Take 1 tablet (5 mg) by mouth nightly as needed    Acute right-sided low back pain with right-sided sciatica       diclofenac 1 % Gel topical gel    VOLTAREN    100 g    Apply 4 grams to knees or 2 grams to hands four times daily using enclosed dosing card.    Acute right-sided low back pain with right-sided sciatica       ibuprofen 600 MG tablet    ADVIL/MOTRIN    30 tablet    Take 1 tablet (600 mg) by mouth every 6 hours as needed for moderate pain    Acute  right-sided low back pain with right-sided sciatica       Lidocaine-Menthol 4-1 % Crea     120 g    Externally apply 1 Application topically 3 times daily as needed    Chronic bilateral low back pain with right-sided sciatica       order for DME     1 Device    Equipment being ordered: heating pad    Acute right-sided low back pain with right-sided sciatica       oxyCODONE IR 5 MG tablet    ROXICODONE    10 tablet    Take 1-2 tablets (5-10 mg) by mouth nightly as needed for severe pain    Chronic bilateral low back pain with right-sided sciatica

## 2018-09-07 NOTE — PROGRESS NOTES
Due to patient being non-English speaking/uses sign language, an  was used for this visit. Only for face-to-face interpretation by an external agency, date and length of interpretation can be found on the scanned worksheet.     name: Magalis Yo  Agency: [employer]  Language: Belinda   Telephone number: [phone]  Type of interpretation: Face-to-face, spoken     ALDO Hunter 8:48 AM September 7, 2018

## 2018-09-10 ASSESSMENT — ENCOUNTER SYMPTOMS
BACK PAIN: 1
SHORTNESS OF BREATH: 0
INSOMNIA: 1

## 2018-09-13 ENCOUNTER — OFFICE VISIT (OUTPATIENT)
Dept: FAMILY MEDICINE | Facility: CLINIC | Age: 33
End: 2018-09-13
Payer: COMMERCIAL

## 2018-09-13 VITALS
DIASTOLIC BLOOD PRESSURE: 91 MMHG | TEMPERATURE: 97.8 F | BODY MASS INDEX: 30.26 KG/M2 | WEIGHT: 162.8 LBS | HEART RATE: 86 BPM | SYSTOLIC BLOOD PRESSURE: 127 MMHG | OXYGEN SATURATION: 98 %

## 2018-09-13 DIAGNOSIS — G89.29 CHRONIC BILATERAL LOW BACK PAIN WITH RIGHT-SIDED SCIATICA: Primary | ICD-10-CM

## 2018-09-13 DIAGNOSIS — M99.9 NONALLOPATHIC LESION OF LUMBAR REGION: ICD-10-CM

## 2018-09-13 DIAGNOSIS — R03.0 ELEVATED BLOOD PRESSURE READING WITHOUT DIAGNOSIS OF HYPERTENSION: ICD-10-CM

## 2018-09-13 DIAGNOSIS — M99.9 NONALLOPATHIC LESION OF THORACIC REGION: ICD-10-CM

## 2018-09-13 DIAGNOSIS — M54.41 CHRONIC BILATERAL LOW BACK PAIN WITH RIGHT-SIDED SCIATICA: Primary | ICD-10-CM

## 2018-09-13 DIAGNOSIS — M99.9 NONALLOPATHIC LESION OF SACRAL REGION: ICD-10-CM

## 2018-09-13 RX ORDER — TRAMADOL HYDROCHLORIDE 50 MG/1
50 TABLET ORAL EVERY 8 HOURS PRN
Qty: 8 TABLET | Refills: 0 | Status: SHIPPED | OUTPATIENT
Start: 2018-09-13 | End: 2019-03-15

## 2018-09-13 RX ORDER — TRAMADOL HYDROCHLORIDE 50 MG/1
50 TABLET ORAL EVERY 8 HOURS PRN
Qty: 8 TABLET | Refills: 0 | Status: SHIPPED | OUTPATIENT
Start: 2018-09-13 | End: 2018-09-13

## 2018-09-13 NOTE — PROGRESS NOTES
COMPA Randall is a 33 year old female who presents today for OMT.   Chief Complaint   Patient presents with     OMT     OMT visit. Pt stated that her pain has been getting better     She presents for OMT. States that her pain is improved since last week. She recently had an MRI on Monday at [facility] due to scheduling per the . Showed me after visit summary that reported she had a L5-S1 disk herniation and they recommended joint injection. She has an appointment with Dr. Montesinos next week on Tuesday to discuss next steps and if she recommends a joint injection. States current pain is the same with bilateral low back pain with right-sided sciatica into the foot. Reports pain is currently 7/10, previously was 8-9/10. She is taking the tramadol that helps the pain and she takes between 0-3 tablets per day depending on the pain. The oxycodone was too strong and made her feel unusual. Did not take any of the medications prescribed by [facility] including oxycodone and decadron. No weakness or change in bowel/bladder function.     All active medical problems, med list, PMHx, and social Hx updated and reviewed.    No Known Allergies  Review of Systems     Review of Systems   Constitutional: Negative for fever.   Respiratory: Negative for shortness of breath.    Cardiovascular: Negative for chest pain.   Genitourinary: Negative.    Musculoskeletal: Positive for back pain.   Neurological: Negative for focal weakness.          Physical Exam     Vitals:    09/13/18 1025 09/13/18 1026   BP: (!) 129/91 (!) 127/91   Pulse: 86    Temp: 97.8  F (36.6  C)    TempSrc: Oral    SpO2: 98%    Weight: 162 lb 12.8 oz (73.8 kg)      Vital signs stable except slightly elevated diastolic BP    Physical Exam  General: Alert and oriented, in no acute distress.  Skin: Warm and dry, no abnormalities noted.  Eyes: Extra-ocular muscles intact  ENT: Speech intact, nasal passages open, no hearing impairment noted.  Neck: Supple, no swelling noted.  CV:  No cyanosis or pallor, warm and well perfused.  Respiratory: No respiratory distress, no accessory muscle use.  Neuro: Comprehension intact.   Psychiatric: Mood and affect appear normal.   Extremities: Warm, able to move all four extremities at will.    Osteopathic Structural Exam found below in OMT Procedure Note.  Assessment and Plan   Tonia is a 33 year old female  who has several longstanding somatic dysfunctions that interfere with quality of life and/or ADLs. After discussion, we have decided to address these dysfunctions with Osteopathic Manipulative Treatment.    Please see OMT Procedure Note below for the specifics of treatment.  Tonia was seen today for omt.    Diagnoses and all orders for this visit:    Chronic bilateral low back pain with right-sided sciatica  Recently seen at [facility] for MRI that showed L5-S1 disk herniation (reviewed AVS from [facility]). She has an appointment scheduled at [place] with Dr. Montesinos next week on 9/18/18. АНДРЕЙ obtained for [facility] with previous recommendations for back injection. Strongly recommended that patient continue to go to physical therapy for rehab. Follow up with me every 2 weeks for OMT since improvement in pain with gentle myofascial techniques.   -     traMADol (ULTRAM) 50 MG tablet; Take 1 tablet (50 mg) by mouth every 8 hours as needed for pain, moderate to severe pain or severe pain  -     OSTEOPATHIC MANIP,3-4 BODY REGN    Nonallopathic lesion of thoracic region  -     OSTEOPATHIC MANIP,3-4 BODY REGN    Nonallopathic lesion of lumbar region  -     OSTEOPATHIC MANIP,3-4 BODY REGN    Nonallopathic lesion of sacral region  -     OSTEOPATHIC MANIP,3-4 BODY REGN    Elevated blood pressure reading without diagnosis of hypertension  Slightly elevated diastolic BP today 127/91 with no previous history of essential hypertension. She has had one previous elevated /93 on 9/4/18. Recommended weight loss and healthy eating. If elevated  at next visit, will plan to discuss starting BP medication and obtaining labs.     Dayna Franklin DO     OMT PROCEDURE NOTE    Body Region: T-spine, Ribs, and Shoulder   Somatic Dysfunction: T2-T7 NRlSr,   Treatment: Direct Myofascial Release Techniques.   Outcome: Improved    Body Region: L-spine  Somatic Dysfunction: L1-L3 NRlSr  Treatment: Direct Myofascial Release Techniques.  Outcome: Improved    Body Region: Sacrum, Pelvis, and Innominates  Somatic Dysfunction: R S1 tenderpoint   Treatment: Direct Myofascial Release Techniques.   Outcome: Improved    The patient actively participated in OMT and was able to communicate both positive and negative feedback throughout. OMT completed without incident. Patient tolerated treatment well. Patient reported that ROM, function, and/or pain level were improved. Advised that pain is occasionally worse during the first 24 hours after treatment and that drinking more water and taking Tylenol or Ibuprofen often help. Patient to return in 2-4 week/s or as needed for repeat OMT.     OMT procedure performed by Dr. Noemi DO.

## 2018-09-13 NOTE — PROGRESS NOTES
Preceptor Attestation:   Patient seen, evaluated and discussed with the resident. I have verified the content of the note, which accurately reflects my assessment of the patient and the plan of care.  Would add that in reviewing blood pressure has had numerous elevated blood pressures, especially diastolic. Likely has hypertension diagnosis.   Supervising Physician:  Terry Choudhary MD

## 2018-09-13 NOTE — MR AVS SNAPSHOT
After Visit Summary   9/13/2018    Tonia Smith    MRN: [id number]           Patient Information     Date Of Birth          1985        Visit Information        Provider Department      9/13/2018 10:20 AM Keron Adam MD [facility]        Today's Diagnoses     Acute bilateral low back pain with right-sided sciatica    -  1      Care Instructions    Here is the plan from today's visit    1. Acute bilateral low back pain with right-sided sciatica  - Continue Physical therapy   - traMADol (ULTRAM) 50 MG tablet; Take 1 tablet (50 mg) by mouth every 8 hours as needed for pain, moderate to severe pain or severe pain  Dispense: 8 tablet; Refill: 0    Please call or return to clinic if your symptoms don't go away.    Follow up plan  Please make a clinic appointment for follow up with me (KERON ADMA) in 2 weeks.     Thank you for coming to [facility] today.  Lab Testing:  **If you had lab testing today and your results are reassuring or normal they will be mailed to you or sent through [employer] within 7 days.   **If the lab tests need quick action we will call you with the results.  The phone number we will call with results is # [phone] (home) . If this is not the best number please call our clinic and change the number.  Medication Refills:  If you need any refills please call your pharmacy and they will contact us.   If you need to  your refill at a new pharmacy, please contact the new pharmacy directly. The new pharmacy will help you get your medications transferred faster.   Scheduling:  If you have any concerns about today's visit or wish to schedule another appointment please call our office during normal business hours [phone] (8-5:00 M-F)  If a referral was made to a [facility] and you don't get a call from central scheduling please call [phone].  If a Mammogram was ordered for you at [facility] call  [phone] to schedule or change your appointment.  If you had an XRay/CT/Ultrasound/MRI ordered the number is [phone] to schedule or change your radiology appointment.   Medical Concerns:  If you have urgent medical concerns please call [phone] at any time of the day.    Dayna Franklin MD              Follow-ups after your visit        Your next 10 appointments already scheduled     Sep 13, 2018  3:45 PM CDT   [facility] with Chris Joyce PT   [facility] ([facility] [place])    [address] [place] [address] [phone]            Sep 18, 2018  9:40 AM CDT   Return Visit with Arianne Montesinos MD   [facility] ([facility])    [address],  [address]  [facility] [place] [address] [phone]              Who to contact     Please call your clinic at [phone] to:    Ask questions about your health    Make or cancel appointments    Discuss your medicines    Learn about your test results    Speak to your doctor            Additional Information About Your Visit        Care EveryWhere ID     This is your Care EveryWhere ID. This could be used by other organizations to access your [facility] medical records  [id number]        Your Vitals Were     Pulse Temperature Last Period Pulse Oximetry Breastfeeding? BMI (Body Mass Index)    86 97.8  F (36.6  C) (Oral) 08/27/2018 (Exact Date) 98% No 30.26 kg/m2       Blood Pressure from Last 3 Encounters:   09/13/18 (!) 127/91   09/07/18 127/87   09/04/18 134/85    Weight from Last 3 Encounters:   09/13/18 162 lb 12.8 oz (73.8 kg)   09/07/18 163 lb 3.2 oz (74 kg)   09/04/18 164 lb (74.4 kg)              Today, you had the following     No orders found for display         Today's Medication Changes          These changes are accurate as of 9/13/18 11:18 AM.  If you have any questions, ask your nurse or doctor.               These medicines have changed or have updated  prescriptions.        Dose/Directions    * TRAMADOL HCL PO   This may have changed:  Another medication with the same name was added. Make sure you understand how and when to take each.   Changed by:  Dayna Franklin MD        Dose:  50 mg   Take 50 mg by mouth every 6 hours as needed for moderate to severe pain   Refills:  0       * traMADol 50 MG tablet   Commonly known as:  ULTRAM   This may have changed:  You were already taking a medication with the same name, and this prescription was added. Make sure you understand how and when to take each.   Used for:  Acute bilateral low back pain with right-sided sciatica   Changed by:  Dayna Franklin MD        Dose:  50 mg   Take 1 tablet (50 mg) by mouth every 8 hours as needed for pain, moderate to severe pain or severe pain   Quantity:  8 tablet   Refills:  0       * Notice:  This list has 2 medication(s) that are the same as other medications prescribed for you. Read the directions carefully, and ask your doctor or other care provider to review them with you.         Where to get your medicines      Some of these will need a paper prescription and others can be bought over the counter.  Ask your nurse if you have questions.     Bring a paper prescription for each of these medications     traMADol 50 MG tablet               Information about OPIOIDS     PRESCRIPTION OPIOIDS: WHAT YOU NEED TO KNOW   We gave you an opioid (narcotic) pain medicine. It is important to manage your pain, but opioids are not always the best choice. You should first try all the other options your care team gave you. Take this medicine for as short a time (and as few doses) as possible.    Some activities can increase your pain, such as bandage changes or therapy sessions. It may help to take your pain medicine 30 to 60 minutes before these activities. Reduce your stress by getting enough sleep, working on hobbies you enjoy and practicing relaxation or meditation. Talk to your care  team about ways to manage your pain beyond prescription opioids.    These medicines have risks:    DO NOT drive when on new or higher doses of pain medicine. These medicines can affect your alertness and reaction times, and you could be arrested for driving under the influence (DUI). If you need to use opioids long-term, talk to your care team about driving.    DO NOT operate heavy machinery    DO NOT do any other dangerous activities while taking these medicines.    DO NOT drink any alcohol while taking these medicines.     If the opioid prescribed includes acetaminophen, DO NOT take with any other medicines that contain acetaminophen. Read all labels carefully. Look for the word  acetaminophen  or  Tylenol.  Ask your pharmacist if you have questions or are unsure.    You can get addicted to pain medicines, especially if you have a history of addiction (chemical, alcohol or substance dependence). Talk to your care team about ways to reduce this risk.    All opioids tend to cause constipation. Drink plenty of water and eat foods that have a lot of fiber, such as fruits, vegetables, prune juice, apple juice and high-fiber cereal. Take a laxative (Miralax, milk of magnesia, Colace, Senna) if you don t move your bowels at least every other day. Other side effects include upset stomach, sleepiness, dizziness, throwing up, tolerance (needing more of the medicine to have the same effect), physical dependence and slowed breathing.    Store your pills in a secure place, locked if possible. We will not replace any lost or stolen medicine. If you don t finish your medicine, please throw away (dispose) as directed by your pharmacist. The [place] Pollution Control Agency has more information about safe disposal: https://www.pca.[facility].[place].us/living-green/managing-unwanted-medications         Primary Care Provider Office Phone # Fax #    Baylee Garrido [phone]       [facility] [address]    [facility] [address]        Equal Access to Services     ESPERANZA PEÑA : Hadii aad ku hadjackelsa Thongali, wajoseda luqadaha, qaybta kaalmabritney porter. So [facility] [phone].    ATENCIÓN: Si habla español, tiene a gordillo disposición servicios gratuitos de asistencia lingüística. Carissa al [phone].    We comply with applicable federal civil rights laws and [place] laws. We do not discriminate on the basis of race, color, national origin, age, disability, sex, sexual orientation, or gender identity.            Thank you!     Thank you for choosing [facility]  for your care. Our goal is always to provide you with excellent care. Hearing back from our patients is one way we can continue to improve our services. Please take a few minutes to complete the written survey that you may receive in the mail after your visit with us. Thank you!             Your Updated Medication List - Protect others around you: Learn how to safely use, store and throw away your medicines at www.disposemymeds.org.          This list is accurate as of 9/13/18 11:18 AM.  Always use your most recent med list.                   Brand Name Dispense Instructions for use Diagnosis    acetaminophen 500 MG tablet    TYLENOL    100 tablet    Take 2 tablets (1,000 mg) by mouth 3 times daily    Chronic bilateral low back pain with right-sided sciatica       cyclobenzaprine 5 MG tablet    FLEXERIL    42 tablet    Take 1 tablet (5 mg) by mouth nightly as needed    Acute right-sided low back pain with right-sided sciatica       diclofenac 1 % Gel topical gel    VOLTAREN    100 g    Apply 4 grams to knees or 2 grams to hands four times daily using enclosed dosing card.    Acute right-sided low back pain with right-sided sciatica       ibuprofen 600 MG tablet    ADVIL/MOTRIN    30 tablet    Take 1 tablet (600 mg) by mouth every 6 hours as needed for moderate pain    Acute right-sided low back  pain with right-sided sciatica       Lidocaine-Menthol 4-1 % Crea     120 g    Externally apply 1 Application topically 3 times daily as needed    Chronic bilateral low back pain with right-sided sciatica       order for DME     1 Device    Equipment being ordered: heating pad    Acute right-sided low back pain with right-sided sciatica       oxyCODONE IR 5 MG tablet    ROXICODONE    10 tablet    Take 1-2 tablets (5-10 mg) by mouth nightly as needed for severe pain    Chronic bilateral low back pain with right-sided sciatica       * TRAMADOL HCL PO      Take 50 mg by mouth every 6 hours as needed for moderate to severe pain        * traMADol 50 MG tablet    ULTRAM    8 tablet    Take 1 tablet (50 mg) by mouth every 8 hours as needed for pain, moderate to severe pain or severe pain    Acute bilateral low back pain with right-sided sciatica       * Notice:  This list has 2 medication(s) that are the same as other medications prescribed for you. Read the directions carefully, and ask your doctor or other care provider to review them with you.

## 2018-09-13 NOTE — PATIENT INSTRUCTIONS
Here is the plan from today's visit    1. Acute bilateral low back pain with right-sided sciatica  - Continue Physical therapy   - traMADol (ULTRAM) 50 MG tablet; Take 1 tablet (50 mg) by mouth every 8 hours as needed for pain, moderate to severe pain or severe pain  Dispense: 8 tablet; Refill: 0    Please call or return to clinic if your symptoms don't go away.    Follow up plan  Please make a clinic appointment for follow up with me (KERON ADAM) in 2 weeks.     Thank you for coming to [facility] today.  Lab Testing:  **If you had lab testing today and your results are reassuring or normal they will be mailed to you or sent through [employer] within 7 days.   **If the lab tests need quick action we will call you with the results.  The phone number we will call with results is # [phone] (home) . If this is not the best number please call our clinic and change the number.  Medication Refills:  If you need any refills please call your pharmacy and they will contact us.   If you need to  your refill at a new pharmacy, please contact the new pharmacy directly. The new pharmacy will help you get your medications transferred faster.   Scheduling:  If you have any concerns about today's visit or wish to schedule another appointment please call our office during normal business hours [phone] (8-5:00 M-F)  If a referral was made to a [facility] and you don't get a call from central scheduling please call [phone].  If a Mammogram was ordered for you at [facility] call [phone] to schedule or change your appointment.  If you had an XRay/CT/Ultrasound/MRI ordered the number is [phone] to schedule or change your radiology appointment.   Medical Concerns:  If you have urgent medical concerns please call [phone] at any time of the day.    Keron Adam MD

## 2018-09-13 NOTE — NURSING NOTE
Due to patient being non-English speaking/uses sign language, an  was used for this visit. Only for face-to-face interpretation by an external agency, date and length of interpretation can be found on the scanned worksheet.     name: Robert Madrid  Agency: [employer]  Language: Belinda   Telephone number: [phone]  Type of interpretation: Face-to-face, spoken     SELIN Kaur

## 2018-09-17 ASSESSMENT — ENCOUNTER SYMPTOMS
FEVER: 0
FOCAL WEAKNESS: 0
BACK PAIN: 1
SHORTNESS OF BREATH: 0

## 2018-09-18 ENCOUNTER — OFFICE VISIT (OUTPATIENT)
Dept: FAMILY MEDICINE | Facility: CLINIC | Age: 33
End: 2018-09-18
Payer: COMMERCIAL

## 2018-09-18 VITALS
DIASTOLIC BLOOD PRESSURE: 97 MMHG | OXYGEN SATURATION: 98 % | TEMPERATURE: 97.9 F | HEART RATE: 97 BPM | WEIGHT: 161.2 LBS | BODY MASS INDEX: 29.97 KG/M2 | SYSTOLIC BLOOD PRESSURE: 137 MMHG | RESPIRATION RATE: 16 BRPM

## 2018-09-18 DIAGNOSIS — M54.16 LUMBAR RADICULOPATHY: Primary | ICD-10-CM

## 2018-09-18 ASSESSMENT — ENCOUNTER SYMPTOMS
DIARRHEA: 0
CONSTIPATION: 0
DYSURIA: 0
BACK PAIN: 1

## 2018-09-18 NOTE — PROGRESS NOTES
"       COMPA Smith is a 33 year old  who presents for   Chief Complaint   Patient presents with     RECHECK     MRI of Lumbar spine from 9/4/18- pain has decreased a \"little\" per pt since last visit.      Patient here to follow-up test results. Has bilateral lower back pain with radiation into right leg. Pain first started about 3 months ago. No injury, pain came on by itself. Patient is here to review MRI results. Pain in leg radiates all the way into right foot. Leg is not weak. No b/B incontinence. She had OMM last week which provided some relief. She was referred to PT, but was told it was a way out until their next appointment/    It was recommended to patient she get epidural CSI. SHe is hesitant about this          A VTM  was used for  this visit.    +++++++      Problem, Medication and Allergy Lists were reviewed and updated if needed..    Patient is an established patient of this clinic..         Review of Systems:   Review of Systems   Gastrointestinal: Negative for constipation and diarrhea.   Genitourinary: Negative for decreased urine volume and dysuria.   Musculoskeletal: Positive for back pain and gait problem.            Physical Exam:     Vitals:    09/18/18 0929 09/18/18 0930   BP: (!) 130/98 (!) 137/97   Pulse: 97    Resp: 16    Temp: 97.9  F (36.6  C)    TempSrc: Oral    SpO2: 98%    Weight: 161 lb 3.2 oz (73.1 kg)      Body mass index is 29.97 kg/(m^2).  Vitals were reviewed and were normal     Physical Exam   Constitutional: She appears well-developed and well-nourished. No distress.   Cardiovascular: Normal rate.    Pulmonary/Chest: No respiratory distress.   Skin: She is not diaphoretic.   Psychiatric: She has a normal mood and affect. Her behavior is normal.      Right Knee Exam   Swelling: None  Effusion: No    Tenderness   None    Range of Motion   Extension: Normal  Flexion:     Normal    Comments:  4/4 strength in RLE with hip flexion, knee extension/flexion " and ankleplantar flexion      Antalgic gait present    Reflexes 1+ hamstring, patellar and achilles bilaterally            Results:       Assessment and Plan        1. Lumbar radiculopathy    -- Will send CD with MRI images to radiology to be uploaded to PACS  --Recommend continuing OMM, referred back to Dr. Candelaria  --Recommend continuing PT, but will switch to [facility] due to pt preference  --Continue tramadol as needed  --Will hold on epidural injection for now    RTC in 6 weeks      - [facility], PT, HAND AND CHIROPRACTIC REFERRAL - [facility]       There are no discontinued medications.    Options for treatment and follow-up care were reviewed with the patient. Tonia Smith  engaged in the decision making process and verbalized understanding of the options discussed and agreed with the final plan.    This note is scribed by Rios Dewitt on behalf of Dr MONTESINOS, MARSHA VELARDE      I have personally discussed and reviewed patient care with the resident.  Right sided lower lumbar pain.  Continue PT and return to clinic in 6 weeks or before if worsening symptoms.  Pt informed that PT 3 days per week is typically not done.  She will need to engage a HEP.  I agree with the above documentation.  Marsha Montesinos MD

## 2018-09-18 NOTE — MR AVS SNAPSHOT
After Visit Summary   9/18/2018    Tonia Smith    MRN: [id number]           Patient Information     Date Of Birth          1985        Visit Information        Provider Department      9/18/2018 9:40 AM Arianne Montesinos MD [facility]        Today's Diagnoses     Lumbar radiculopathy    -  1      Care Instructions    Here is the plan from today's visit    1. Lumbar radiculopathy    OMM once a week    PT 2 times a week    - [facility], PT, HAND AND CHIROPRACTIC REFERRAL - [facility]      Please call or return to clinic if your symptoms don't go away.    Follow up plan      Follow-up with Dr. Candelaria for OMM in 1-2 weeks    Thank you for coming to [facility] today.  Lab Testing:  **If you had lab testing today and your results are reassuring or normal they will be mailed to you or sent through [employer] within 7 days.   **If the lab tests need quick action we will call you with the results.  The phone number we will call with results is # [phone] (home) . If this is not the best number please call our clinic and change the number.  Medication Refills:  If you need any refills please call your pharmacy and they will contact us.   If you need to  your refill at a new pharmacy, please contact the new pharmacy directly. The new pharmacy will help you get your medications transferred faster.   Scheduling:  If you have any concerns about today's visit or wish to schedule another appointment please call our office during normal business hours [phone] (8-5:00 M-F)  If a referral was made to a [facility] and you don't get a call from central scheduling please call [phone].  If a Mammogram was ordered for you at [facility] call [phone] to schedule or change your appointment.  If you had an XRay/CT/Ultrasound/MRI ordered the number is [phone] to schedule or change your radiology appointment.   Medical Concerns:  If you have  urgent medical concerns please call [phone] at any time of the day.    Arianne Montesinos MD          Follow-ups after your visit        Additional Services     [facility], PT, HAND AND CHIROPRACTIC REFERRAL - [facility]       **This order will print in the [facility] Scheduling Office**    Physical Therapy, Hand Therapy and Chiropractic Care are available through:    *[facility]  *[facility]  *[facility]    Call one number to schedule at any of the above locations: [phone].    Your provider has referred you to: [facility] - PT and/or Chiropractic Care determined by clinical presentation at [facility] or [facility] Initial Visit    Indication/Reason for Referral: Low Back Pain, L5/S1 radiculopathy  Onset of Illness: 3 months,   Therapy Orders: Evaluate and Treat, 2-3 times per week  Special Programs: None  Special Request: Manual Therapy: Myofascial Release/Massage  Modalities: As Indicated:   None    Rosmery Botello      Additional Comments for the Therapist or Chiropractor:     Please be aware that coverage of these services is subject to the terms and limitations of your health insurance plan.  Call member services at your health plan with any benefit or coverage questions.      Please bring the following to your appointment:    *Your personal calendar for scheduling future appointments  *Comfortable clothing                  Who to contact     Please call your clinic at [phone] to:    Ask questions about your health    Make or cancel appointments    Discuss your medicines    Learn about your test results    Speak to your doctor            Additional Information About Your Visit        Care EveryWhere ID     This is your Care EveryWhere ID. This could be used by other organizations to access your [facility] medical records  [id number]        Your Vitals Were     Pulse Temperature Respirations Last Period Pulse Oximetry BMI (Body Mass Index)    97 97.9  F  (36.6  C) (Oral) 16 08/27/2018 (Exact Date) 98% 29.97 kg/m2       Blood Pressure from Last 3 Encounters:   09/18/18 (!) 137/97   09/13/18 (!) 127/91   09/07/18 127/87    Weight from Last 3 Encounters:   09/18/18 161 lb 3.2 oz (73.1 kg)   09/13/18 162 lb 12.8 oz (73.8 kg)   09/07/18 163 lb 3.2 oz (74 kg)              We Performed the Following     [facility], PT, HAND AND CHIROPRACTIC REFERRAL - [facility]        Primary Care Provider Office Phone # Fax #    Baylee MD Radhika [phone]       [facility] [address] [place] [address]        Equal Access to Services     ESPERANZA PEÑA : Serena concepcion Sojessica, waaxda luqadaha, qaybta kaalmayesica gustafson, britney simons . So [facility] [phone].    ATENCIÓN: Si habla español, tiene a gordillo disposición servicios gratuitos de asistencia lingüística. Carissa al [phone].    We comply with applicable federal civil rights laws and [place] laws. We do not discriminate on the basis of race, color, national origin, age, disability, sex, sexual orientation, or gender identity.            Thank you!     Thank you for choosing [facility]  for your care. Our goal is always to provide you with excellent care. Hearing back from our patients is one way we can continue to improve our services. Please take a few minutes to complete the written survey that you may receive in the mail after your visit with us. Thank you!             Your Updated Medication List - Protect others around you: Learn how to safely use, store and throw away your medicines at www.disposemymeds.org.          This list is accurate as of 9/18/18 10:21 AM.  Always use your most recent med list.                   Brand Name Dispense Instructions for use Diagnosis    acetaminophen 500 MG tablet    TYLENOL    100 tablet    Take 2 tablets (1,000 mg) by mouth 3 times daily    Chronic bilateral low back pain with right-sided sciatica        ibuprofen 600 MG tablet    ADVIL/MOTRIN    30 tablet    Take 1 tablet (600 mg) by mouth every 6 hours as needed for moderate pain    Acute right-sided low back pain with right-sided sciatica       order for DME     1 Device    Equipment being ordered: heating pad    Acute right-sided low back pain with right-sided sciatica       traMADol 50 MG tablet    ULTRAM    8 tablet    Take 1 tablet (50 mg) by mouth every 8 hours as needed for pain, moderate to severe pain or severe pain    Chronic bilateral low back pain with right-sided sciatica

## 2018-09-18 NOTE — NURSING NOTE
Due to patient being non-English speaking/uses sign language, an  was used for this visit. Only for face-to-face interpretation by an external agency, date and length of interpretation can be found on the scanned worksheet.     name: Yuli Espinal  Agency: [employer]  Language: [facility]   Telephone number: [phone]   Type of interpretation: Face-to-face, spoken     Lisy Carbone CMA

## 2018-09-18 NOTE — PATIENT INSTRUCTIONS
Here is the plan from today's visit    1. Lumbar radiculopathy    OMM once a week    PT 2 times a week    - [facility], PT, HAND AND CHIROPRACTIC REFERRAL - F[facility]      Please call or return to clinic if your symptoms don't go away.    Follow up plan      Follow-up with Dr. Candelaria for OMM in 1-2 weeks    Thank you for coming to [facility] today.  Lab Testing:  **If you had lab testing today and your results are reassuring or normal they will be mailed to you or sent through Trampoline within 7 days.   **If the lab tests need quick action we will call you with the results.  The phone number we will call with results is # [phone] (home) . If this is not the best number please call our clinic and change the number.  Medication Refills:  If you need any refills please call your pharmacy and they will contact us.   If you need to  your refill at a new pharmacy, please contact the new pharmacy directly. The new pharmacy will help you get your medications transferred faster.   Scheduling:  If you have any concerns about today's visit or wish to schedule another appointment please call our office during normal business hours [phone] (8-5:00 M-F)  If a referral was made to a [facility] and you don't get a call from central scheduling please call [phone].  If a Mammogram was ordered for you at [facility] call [phone] to schedule or change your appointment.  If you had an XRay/CT/Ultrasound/MRI ordered the number is [phone] to schedule or change your radiology appointment.   Medical Concerns:  If you have urgent medical concerns please call [phone] at any time of the day.    Arianne Montesinos MD

## 2018-09-19 PROBLEM — R03.0 ELEVATED BLOOD PRESSURE READING WITHOUT DIAGNOSIS OF HYPERTENSION: Status: ACTIVE | Noted: 2018-09-19

## 2018-09-20 ENCOUNTER — THERAPY VISIT (OUTPATIENT)
Dept: PHYSICAL THERAPY | Facility: CLINIC | Age: 33
End: 2018-09-20
Payer: COMMERCIAL

## 2018-09-20 DIAGNOSIS — G89.29 CHRONIC BILATERAL LOW BACK PAIN WITH RIGHT-SIDED SCIATICA: ICD-10-CM

## 2018-09-20 DIAGNOSIS — M54.41 CHRONIC BILATERAL LOW BACK PAIN WITH RIGHT-SIDED SCIATICA: ICD-10-CM

## 2018-09-20 PROCEDURE — 97530 THERAPEUTIC ACTIVITIES: CPT | Mod: GP | Performed by: PHYSICAL THERAPIST

## 2018-09-20 PROCEDURE — 97110 THERAPEUTIC EXERCISES: CPT | Mod: GP | Performed by: PHYSICAL THERAPIST

## 2018-09-25 ENCOUNTER — OFFICE VISIT (OUTPATIENT)
Dept: FAMILY MEDICINE | Facility: CLINIC | Age: 33
End: 2018-09-25
Payer: COMMERCIAL

## 2018-09-25 VITALS
SYSTOLIC BLOOD PRESSURE: 126 MMHG | RESPIRATION RATE: 16 BRPM | BODY MASS INDEX: 30.3 KG/M2 | HEART RATE: 84 BPM | TEMPERATURE: 97.7 F | DIASTOLIC BLOOD PRESSURE: 82 MMHG | OXYGEN SATURATION: 97 % | WEIGHT: 163 LBS

## 2018-09-25 DIAGNOSIS — M54.41 CHRONIC BILATERAL LOW BACK PAIN WITH RIGHT-SIDED SCIATICA: Primary | ICD-10-CM

## 2018-09-25 DIAGNOSIS — G89.29 CHRONIC BILATERAL LOW BACK PAIN WITH RIGHT-SIDED SCIATICA: Primary | ICD-10-CM

## 2018-09-25 RX ORDER — TRAZODONE HYDROCHLORIDE 50 MG/1
50 TABLET, FILM COATED ORAL
COMMUNITY
End: 2019-03-15

## 2018-09-25 RX ORDER — PREDNISONE 20 MG/1
TABLET ORAL
Refills: 0 | COMMUNITY
Start: 2018-09-10 | End: 2019-03-15

## 2018-09-25 RX ORDER — GABAPENTIN 300 MG/1
300 CAPSULE ORAL
COMMUNITY
Start: 2018-09-10 | End: 2019-03-15

## 2018-09-25 RX ORDER — CYCLOBENZAPRINE HCL 5 MG
5 TABLET ORAL
COMMUNITY
End: 2019-03-15

## 2018-09-25 RX ORDER — OXYCODONE HYDROCHLORIDE 5 MG/1
5 TABLET ORAL
COMMUNITY
Start: 2018-09-10 | End: 2019-03-15

## 2018-09-25 NOTE — PROGRESS NOTES
Preceptor Attestation:   Patient seen, evaluated and discussed with the resident. I have verified the content of the note, which accurately reflects my assessment of the patient and the plan of care.   Supervising Physician:  Terry Choudhary MD

## 2018-09-25 NOTE — MR AVS SNAPSHOT
After Visit Summary   9/25/2018    Tonia Smith    MRN: [id number]           Patient Information     Date Of Birth          1985        Visit Information        Provider Department      9/25/2018 9:20 AM Fredy Peña MD [facility]        Today's Diagnoses     Chronic bilateral low back pain with right-sided sciatica    -  1      Care Instructions    Here is the plan from today's visit    1. Chronic bilateral low back pain with right-sided sciatica  Continue with physical therapy as showing some improvement  Recommend scheduling for further OMM at [facility].  - If pain worsens, have new numbness or weakness in legs, loss of bowel or bladder control, please return for reevaluation.    Please call or return to clinic if your symptoms don't go away.    Follow up plan  Please make a clinic appointment for follow up for OMM/OMT with DO provider.    Thank you for coming to [facility] today.  Lab Testing:  **If you had lab testing today and your results are reassuring or normal they will be mailed to you or sent through WTFast within 7 days.   **If the lab tests need quick action we will call you with the results.  The phone number we will call with results is # [phone] (home) . If this is not the best number please call our clinic and change the number.  Medication Refills:  If you need any refills please call your pharmacy and they will contact us.   If you need to  your refill at a new pharmacy, please contact the new pharmacy directly. The new pharmacy will help you get your medications transferred faster.   Scheduling:  If you have any concerns about today's visit or wish to schedule another appointment please call our office during normal business hours [phone] (8-5:00 M-F)  If a referral was made to a [facility] and you don't get a call from central scheduling please call [phone].  If a Mammogram was ordered for you at  [facility] call [phone] to schedule or change your appointment.  If you had an XRay/CT/Ultrasound/MRI ordered the number is [phone] to schedule or change your radiology appointment.   Medical Concerns:  If you have urgent medical concerns please call [phone] at any time of the day.    Fredy Peña MD            Follow-ups after your visit        Your next 10 appointments already scheduled     Sep 27, 2018  9:15 AM CDT   [facility] Spine with Chris Joyce, PT   [facility] ([facility])    [address] [place] [id number] [phone]            Sep 29, 2018  8:00 AM CDT   [facility] Spine with Deanna Malone PT   [facility] ([facility])    [address] [place] [phone]            Oct 03, 2018  9:20 AM CDT   [facility] Spine with Casey Dominguez PT   [facility] ([facility])    [address] [place] [phone]            Oct 05, 2018  9:00 AM CDT   [facility] Spine with Casey Dominguez PT   [facility] ([facility])    [address] [place] [phone]              Who to contact     Please call your clinic at [phone] to:    Ask questions about your health    Make or cancel appointments    Discuss your medicines    Learn about your test results    Speak to your doctor            Additional Information About Your Visit        Care EveryWhere ID     This is your Care EveryWhere ID. This could be used by other organizations to access your [place] medical records  [id number]        Your Vitals Were     Pulse Temperature Respirations Last Period Pulse Oximetry BMI (Body Mass Index)    84 97.7  F (36.5  C) (Oral) 16 08/27/2018 (Exact Date) 97% 30.3 kg/m2       Blood Pressure from Last 3 Encounters:   09/25/18 126/82   09/18/18 (!)  137/97   09/13/18 (!) 127/91    Weight from Last 3 Encounters:   09/25/18 163 lb (73.9 kg)   09/18/18 161 lb 3.2 oz (73.1 kg)   09/13/18 162 lb 12.8 oz (73.8 kg)              Today, you had the following     No orders found for display      Information about OPIOIDS     PRESCRIPTION OPIOIDS: WHAT YOU NEED TO KNOW   We gave you an opioid (narcotic) pain medicine. It is important to manage your pain, but opioids are not always the best choice. You should first try all the other options your care team gave you. Take this medicine for as short a time (and as few doses) as possible.    Some activities can increase your pain, such as bandage changes or therapy sessions. It may help to take your pain medicine 30 to 60 minutes before these activities. Reduce your stress by getting enough sleep, working on hobbies you enjoy and practicing relaxation or meditation. Talk to your care team about ways to manage your pain beyond prescription opioids.    These medicines have risks:    DO NOT drive when on new or higher doses of pain medicine. These medicines can affect your alertness and reaction times, and you could be arrested for driving under the influence (DUI). If you need to use opioids long-term, talk to your care team about driving.    DO NOT operate heavy machinery    DO NOT do any other dangerous activities while taking these medicines.    DO NOT drink any alcohol while taking these medicines.     If the opioid prescribed includes acetaminophen, DO NOT take with any other medicines that contain acetaminophen. Read all labels carefully. Look for the word  acetaminophen  or  Tylenol.  Ask your pharmacist if you have questions or are unsure.    You can get addicted to pain medicines, especially if you have a history of addiction (chemical, alcohol or substance dependence). Talk to your care team about ways to reduce this risk.    All opioids tend to cause constipation. Drink plenty of water and eat foods that have a lot of  fiber, such as fruits, vegetables, prune juice, apple juice and high-fiber cereal. Take a laxative (Miralax, milk of magnesia, Colace, Senna) if you don t move your bowels at least every other day. Other side effects include upset stomach, sleepiness, dizziness, throwing up, tolerance (needing more of the medicine to have the same effect), physical dependence and slowed breathing.    Store your pills in a secure place, locked if possible. We will not replace any lost or stolen medicine. If you don t finish your medicine, please throw away (dispose) as directed by your pharmacist. The [place] Pollution Control Agency has more information about safe disposal: https://[email or web address].[email or web address].mn.us/living-green/managing-unwanted-medications         Primary Care Provider Office Phone # Fax #    Baylee Garrido [phone]       [facility] [address] [address]        Equal Access to Services     ESPERANZA PEÑA : Hadii aad ku hadasho Sojessica, waaxda luqadaha, qaybta kaalmada adesanket, britney simons . So [facility] [phone].    ATENCIÓN: Si habla español, tiene a gordillo disposición servicios gratuitos de asistencia lingüística. Carissa al [phone].    We comply with applicable federal civil rights laws and [place] laws. We do not discriminate on the basis of race, color, national origin, age, disability, sex, sexual orientation, or gender identity.            Thank you!     Thank you for choosing [facility]  for your care. Our goal is always to provide you with excellent care. Hearing back from our patients is one way we can continue to improve our services. Please take a few minutes to complete the written survey that you may receive in the mail after your visit with us. Thank you!             Your Updated Medication List - Protect others around you: Learn how to safely use, store and throw away your medicines at  www.disposemymeds.org.          This list is accurate as of 9/25/18 10:58 AM.  Always use your most recent med list.                   Brand Name Dispense Instructions for use Diagnosis    acetaminophen 500 MG tablet    TYLENOL    100 tablet    Take 2 tablets (1,000 mg) by mouth 3 times daily    Chronic bilateral low back pain with right-sided sciatica       cyclobenzaprine 5 MG tablet    FLEXERIL     Take 5 mg by mouth        gabapentin 300 MG capsule    NEURONTIN     Take 300 mg by mouth        ibuprofen 600 MG tablet    ADVIL/MOTRIN    30 tablet    Take 1 tablet (600 mg) by mouth every 6 hours as needed for moderate pain    Acute right-sided low back pain with right-sided sciatica       order for DME     1 Device    Equipment being ordered: heating pad    Acute right-sided low back pain with right-sided sciatica       oxyCODONE IR 5 MG tablet    ROXICODONE     Take 5 mg by mouth        predniSONE 20 MG tablet    DELTASONE     TK 1 T PO  BID        traMADol 50 MG tablet    ULTRAM    8 tablet    Take 1 tablet (50 mg) by mouth every 8 hours as needed for pain, moderate to severe pain or severe pain    Chronic bilateral low back pain with right-sided sciatica       traZODone 50 MG tablet    DESYREL     Take 50 mg by mouth

## 2018-09-25 NOTE — PATIENT INSTRUCTIONS
Here is the plan from today's visit    1. Chronic bilateral low back pain with right-sided sciatica  Continue with physical therapy as showing some improvement  Recommend scheduling for further OMM at [facility].  - If pain worsens, have new numbness or weakness in legs, loss of bowel or bladder control, please return for reevaluation.    Please call or return to clinic if your symptoms don't go away.    Follow up plan  Please make a clinic appointment for follow up for OMM/OMT with DO provider.    Thank you for coming to [facility] today.  Lab Testing:  **If you had lab testing today and your results are reassuring or normal they will be mailed to you or sent through Erly within 7 days.   **If the lab tests need quick action we will call you with the results.  The phone number we will call with results is # [phone] (home) . If this is not the best number please call our clinic and change the number.  Medication Refills:  If you need any refills please call your pharmacy and they will contact us.   If you need to  your refill at a new pharmacy, please contact the new pharmacy directly. The new pharmacy will help you get your medications transferred faster.   Scheduling:  If you have any concerns about today's visit or wish to schedule another appointment please call our office during normal business hours [phone] (8-5:00 M-F)  If a referral was made to a [facility] and you don't get a call from central scheduling please call [phone].  If a Mammogram was ordered for you at [facility] call [phone] to schedule or change your appointment.  If you had an XRay/CT/Ultrasound/MRI ordered the number is [phone] to schedule or change your radiology appointment.   Medical Concerns:  If you have urgent medical concerns please call [phone] at any time of the day.    Fredy Peña MD

## 2018-09-25 NOTE — NURSING NOTE
Due to patient being non-English speaking/uses sign language, an  was used for this visit. Only for face-to-face interpretation by an external agency, date and length of interpretation can be found on the scanned worksheet.     name: Magalis Yo  Agency: [employer]  Language: Belinda   Telephone number: [phone]  Type of interpretation: Face-to-face, spoken     Citlaly Prasad CMA 10:21 AM September 25, 2018

## 2018-09-25 NOTE — PROGRESS NOTES
COMPA Smith is a 33 year old  who presents for   Chief Complaint   Patient presents with     Back Pain     x 2-3 months low back from R hip down R leg         Back Pain Follow Up    Description:   Location of pain:  Lumbar,  right  Character of pain: sharp  Pain radiation: radiates into the right buttocks and radiates below the knee   Since last visit, pain is:  improved  New numbness or weakness in legs, not attributed to pain:  no  What therapies have you tried? Physical therapy, also taking pain medications, ibuprofen and tylenol and some tramadol prescribed previously  What has worked well? therapy    Intensity: Currently moderate    History:   Pain interferes with job: Yes  Therapies tried without relief: acetaminophen (Tylenol)  Therapies tried with relief: NSAIDs and Physical Therapy       Accompanying Signs & Symptoms:  Risk of Fracture:  None  Risk of Cauda Equina:  None  Risk of Infection:  None  Risk of Cancer:  None     Adherence and Exercise  Medication side effects: no  How often is a medication missed? Never  Exercise: currently only physical therapy and walking       +++++++    Problem, Medication and Allergy Lists were reviewed and updated if needed..    Patient is an established patient of this clinic..         Review of Systems:   Review of Systems   Constitutional: Negative for appetite change, chills and fever.   HENT: Negative for congestion and sore throat.    Eyes: Negative for visual disturbance.   Respiratory: Negative for cough, shortness of breath and wheezing.    Cardiovascular: Negative for chest pain and leg swelling.   Gastrointestinal: Negative for abdominal pain, diarrhea, nausea and vomiting.   Genitourinary: Negative for difficulty urinating and dysuria.   Musculoskeletal: Positive for back pain. Negative for gait problem and neck stiffness.   Skin: Negative for rash.   Neurological: Negative for dizziness and headaches.   Psychiatric/Behavioral: Negative for  agitation and confusion.            Physical Exam:     Vitals:    09/25/18 1021   BP: 126/82   Pulse: 84   Resp: 16   Temp: 97.7  F (36.5  C)   TempSrc: Oral   SpO2: 97%   Weight: 163 lb (73.9 kg)     Body mass index is 30.3 kg/(m^2).  Vitals were reviewed and were normal     Physical Exam   Constitutional: She is oriented to person, place, and time. She appears well-developed and well-nourished. No distress.   HENT:   Head: Normocephalic and atraumatic.   Eyes: EOM are normal. Right eye exhibits no discharge. Left eye exhibits no discharge.   Neck: Normal range of motion. Neck supple.   Cardiovascular: Normal rate and regular rhythm.    No murmur heard.  Pulmonary/Chest: Effort normal and breath sounds normal. No respiratory distress. She has no wheezes. She has no rales.   Musculoskeletal:        Lumbar back: She exhibits tenderness (muscular, worst on R side). She exhibits no bony tenderness.   Lumbar spine:  Intact flexion, decreased extension and lateral motion  Pain worst with extension and twisting   Neurological: She is alert and oriented to person, place, and time.   Skin: Skin is warm and dry. She is not diaphoretic.   Psychiatric: She has a normal mood and affect. Her behavior is normal. Thought content normal.         Results:   Results from last visit:  Admission on 03/01/2018, Discharged on 03/01/2018   Component Date Value Ref Range Status     HCG Qualitative Serum 03/01/2018 Negative  NEG^Negative Final    Comment: This test is for screening purposes.  Results should be interpreted along with   the clinical picture.  Confirmation testing is available if warranted by   ordering HMV980, HCG Quantitative Pregnancy.       Creatinine 03/01/2018 0.42* 0.52 - 1.04 mg/dL Final     GFR Estimate 03/01/2018 >90  >60 mL/min/1.7m2 Final    Non  GFR Calc     GFR Estimate If Black 03/01/2018 >90  >60 mL/min/1.7m2 Final    African American GFR Calc       Assessment and Plan        1. Chronic  bilateral low back pain with right-sided sciatica  Here for follow up of back pain, since last seen has had some improvement in mobility and slight reduction in pain.  Had MRI done and had been reviewed at previous visit.  No leg weakness, no incontinence, no osteo or mass concerns on imaging.  She reports physical therapy is helping and wishes to continue doing exercises, still has therapy visits left.  She had been taking some tramadol previously for pain along with tylenol and NSAIDs, discouraged long-term use of opioids for chronic pain and plan no further prescriptions of opioids for her back pain, none given today.  She had seen Dr. Montesinos previously, had been referred for possible spinal injection/nerve block, she states she no longer desires and wants to continue with therapy.  - Encouraged to continue therapy  - Has done OMM which provided some relief, will continue       There are no discontinued medications.    Options for treatment and follow-up care were reviewed with the patient. Tonia Smith  engaged in the decision making process and verbalized understanding of the options discussed and agreed with the final plan.    Fredy Peña MD

## 2018-09-26 ASSESSMENT — PATIENT HEALTH QUESTIONNAIRE - PHQ9: SUM OF ALL RESPONSES TO PHQ QUESTIONS 1-9: 6

## 2018-09-27 ENCOUNTER — THERAPY VISIT (OUTPATIENT)
Dept: PHYSICAL THERAPY | Facility: CLINIC | Age: 33
End: 2018-09-27
Payer: COMMERCIAL

## 2018-09-27 DIAGNOSIS — M54.41 CHRONIC BILATERAL LOW BACK PAIN WITH RIGHT-SIDED SCIATICA: ICD-10-CM

## 2018-09-27 DIAGNOSIS — G89.29 CHRONIC BILATERAL LOW BACK PAIN WITH RIGHT-SIDED SCIATICA: ICD-10-CM

## 2018-09-27 PROCEDURE — 97530 THERAPEUTIC ACTIVITIES: CPT | Mod: GP | Performed by: PHYSICAL THERAPIST

## 2018-09-27 PROCEDURE — 97110 THERAPEUTIC EXERCISES: CPT | Mod: GP | Performed by: PHYSICAL THERAPIST

## 2018-09-27 ASSESSMENT — ENCOUNTER SYMPTOMS
SORE THROAT: 0
NECK STIFFNESS: 0
WHEEZING: 0
COUGH: 0
NAUSEA: 0
ABDOMINAL PAIN: 0
FEVER: 0
DIZZINESS: 0
VOMITING: 0
CHILLS: 0
SHORTNESS OF BREATH: 0
BACK PAIN: 1
DYSURIA: 0
AGITATION: 0
APPETITE CHANGE: 0
DIARRHEA: 0
DIFFICULTY URINATING: 0
HEADACHES: 0
CONFUSION: 0

## 2018-10-01 ENCOUNTER — OFFICE VISIT (OUTPATIENT)
Dept: FAMILY MEDICINE | Facility: CLINIC | Age: 33
End: 2018-10-01
Payer: COMMERCIAL

## 2018-10-01 VITALS
BODY MASS INDEX: 30.89 KG/M2 | DIASTOLIC BLOOD PRESSURE: 85 MMHG | SYSTOLIC BLOOD PRESSURE: 126 MMHG | HEART RATE: 80 BPM | WEIGHT: 166.2 LBS | OXYGEN SATURATION: 99 % | TEMPERATURE: 98.2 F

## 2018-10-01 DIAGNOSIS — M99.06 SOMATIC DYSFUNCTION OF LOWER EXTREMITIES: ICD-10-CM

## 2018-10-01 DIAGNOSIS — M99.9 NONALLOPATHIC LESION OF LUMBAR REGION: Primary | ICD-10-CM

## 2018-10-01 DIAGNOSIS — M99.05 SOMATIC DYSFUNCTION OF PELVIC REGION: ICD-10-CM

## 2018-10-01 DIAGNOSIS — M99.9 NONALLOPATHIC LESION OF THORACIC REGION: ICD-10-CM

## 2018-10-01 NOTE — NURSING NOTE
Due to patient being non-English speaking/uses sign language, an  was used for this visit. Only for face-to-face interpretation by an external agency, date and length of interpretation can be found on the scanned worksheet.     name: warren kauffman  Agency: [employer]  Language: [facility]   Telephone number: [phone]  Type of interpretation: Face-to-face, spoken

## 2018-10-01 NOTE — PATIENT INSTRUCTIONS
Please call or return to clinic if your symptoms don't go away.    Follow up plan  Please make a clinic appointment for follow up with any DO physician in 2 weeks for repeat OMT.    Thank you for coming to [facility] today.  Lab Testing:  **If you had lab testing today and your results are reassuring or normal they will be mailed to you or sent through Si [employer] within 7 days.   **If the lab tests need quick action we will call you with the results.  The phone number we will call with results is # [phone] (home) . If this is not the best number please call our clinic and change the number.  Medication Refills:  If you need any refills please call your pharmacy and they will contact us.   If you need to  your refill at a new pharmacy, please contact the new pharmacy directly. The new pharmacy will help you get your medications transferred faster.   Scheduling:  If you have any concerns about today's visit or wish to schedule another appointment please call our office during normal business hours [phone] (8-5:00 M-F)  If a referral was made to a [facility] and you don't get a call from central scheduling please call [phone].  If a Mammogram was ordered for you at [facility] call [phone] to schedule or change your appointment.  If you had an XRay/CT/Ultrasound/MRI ordered the number is [phone] to schedule or change your radiology appointment.   Medical Concerns:  If you have urgent medical concerns please call [phone] at any time of the day.    Kathy Augustin,

## 2018-10-01 NOTE — PROGRESS NOTES
COMPA Randall is a 33 year old female who presents today for   Chief Complaint   Patient presents with     RECHECK     follow up     Patient presents for repeat OMT.  One year ago, patient felt back pain.  The pain started again about 3 months ago.  it is located in the lower back, right-sided.  She has been taking tramadol as needed at night for pain, which is helping.  She is also been doing OMT, as well as physical therapy.  She states that her pain currently is 4 out of 10, and when the pain first started and was at its worst it was 9 out of 10.    An [employer]  was used for  this visit.     Patient Active Problem List   Diagnosis     Health Care Home     Female circumcision     S/P  section     Headache     History of severe pre-eclampsia     Lumbago     Bilateral low back pain with right-sided sciatica     Elevated blood pressure reading without diagnosis of hypertension       Current Outpatient Prescriptions   Medication Sig Dispense Refill     acetaminophen (TYLENOL) 500 MG tablet Take 2 tablets (1,000 mg) by mouth 3 times daily 100 tablet 0     cyclobenzaprine (FLEXERIL) 5 MG tablet Take 5 mg by mouth       gabapentin (NEURONTIN) 300 MG capsule Take 300 mg by mouth       ibuprofen (ADVIL/MOTRIN) 600 MG tablet Take 1 tablet (600 mg) by mouth every 6 hours as needed for moderate pain 30 tablet 1     oxyCODONE IR (ROXICODONE) 5 MG tablet Take 5 mg by mouth       predniSONE (DELTASONE) 20 MG tablet TK 1 T PO  BID  0     traMADol (ULTRAM) 50 MG tablet Take 1 tablet (50 mg) by mouth every 8 hours as needed for pain, moderate to severe pain or severe pain 8 tablet 0     traZODone (DESYREL) 50 MG tablet Take 50 mg by mouth       order for DME Equipment being ordered: heating pad (Patient not taking: Reported on 2018) 1 Device 0       Active medical problems and medication list reviewed     No Known Allergies    Social History     Social History     Marital status:      Spouse name:  N/A     Number of children: 0     Years of education: 9th grade     Occupational History     labor [employer]     Social History Main Topics     Smoking status: Never Smoker     Smokeless tobacco: Never Used     Alcohol use No     Drug use: No     Sexual activity: Yes     Partners: Male     Birth control/ protection: None     Other Topics Concern      Service No     Blood Transfusions No     Caffeine Concern No     Occupational Exposure No     Hobby Hazards No     Sleep Concern No     Stress Concern No     Weight Concern No     Special Diet No     Back Care No     Exercise No     Bike Helmet No     Seat Belt No     Self-Exams No     Social History Narrative          Review of Systems:     Review of Systems   Constitutional: Negative for fever.   Musculoskeletal: Positive for back pain. Negative for falls and joint pain.   Neurological: Negative for sensory change, focal weakness, weakness and headaches.               Physical Exam:     Vitals:    10/01/18 1314   BP: 126/85   Pulse: 80   Temp: 98.2  F (36.8  C)   TempSrc: Oral   SpO2: 99%   Weight: 166 lb 3.2 oz (75.4 kg)     Physical Exam   Constitutional: She is well-developed, well-nourished, and in no distress. No distress.   Cardiovascular: Regular rhythm.    Pulmonary/Chest: No respiratory distress.   Musculoskeletal: She exhibits no edema.   Neurological: She is alert. She exhibits normal muscle tone. Coordination normal.   Skin: She is not diaphoretic.   Nursing note and vitals reviewed.    See OMT procedure note below for OMT physical exam    OMT Procedure Note:      Body Region: Lower extremities    Somatic Dysfunction #1: tightness of bilateral hamstrings  Treatment: muscle energy: direct   Outcome: Improved    Body Region: Thoracic    Somatic Dysfunction #1: tightness and tenderness of bilateral paraspinal musculature  Treatment: Myofascial: direct and Soft Tissue   Outcome: Improved    Body Region: Lumbar    Somatic Dysfunction  #1: tightness and tenderness of bilateral paraspinal musculature  Treatment: Myofascial: direct and Soft Tissue   Outcome: Improved    Somatic Dysfunction #2: lumbo-pelvic roll to the right  Treatment: muscle energy: direct   Outcome: difficulty to perform secondary to patient not able to cooperate. No change.    Body Region: Pelvis    Somatic Dysfunction #1: anterior innominate on the right  Treatment: muscle energy: direct   Outcome: Improved    Somatic Dysfunction #2: posterior innominate on the left  Treatment: muscle energy: direct   Outcome: Improved    Assessment and Plan     1. Somatic dysfunction of lower extremitis  2. Nonallopathic lesion of thoracic region  3. Nonallopathic lesion of lumbar region  4. Somatic dysfunction of pelvic region  - OSTEOPATHIC MANIP,3-4 BODY REGN    OMT completed without incident. Patient tolerated treatment  well. Advised that pain is occasionally worse during the first 24 hours after treatment. Patient to return in 2 weeks for repeat OMT. Patient is showing improvement of back pain with medication, PT, and OMT. Patient had some difficulty following some instructions 2/2 protection of acute back pain. Discussed this with the patient. I recommended patient continue with physical therapy and medication, and perform stretches of the hamstrings      Kathy Augustin,

## 2018-10-01 NOTE — PROGRESS NOTES
Preceptor Attestation:   Patient seen, evaluated and discussed with the resident.   I have verified the content of the note, which accurately reflects my assessment of the patient and the plan of care.   Supervising Physician:  Diego Martines MD

## 2018-10-01 NOTE — MR AVS SNAPSHOT
After Visit Summary   10/1/2018    Tonia Smith    MRN: [id number]           Patient Information     Date Of Birth          1985        Visit Information        Provider Department      10/1/2018 1:00 PM Kathy Augustin DO [facility]        Care Instructions            Please call or return to clinic if your symptoms don't go away.    Follow up plan  Please make a clinic appointment for follow up with any DO physician in 2 weeks for repeat OMT.    Thank you for coming to [facility] today.  Lab Testing:  **If you had lab testing today and your results are reassuring or normal they will be mailed to you or sent through [facility] within 7 days.   **If the lab tests need quick action we will call you with the results.  The phone number we will call with results is # [phone] (home) . If this is not the best number please call our clinic and change the number.  Medication Refills:  If you need any refills please call your pharmacy and they will contact us.   If you need to  your refill at a new pharmacy, please contact the new pharmacy directly. The new pharmacy will help you get your medications transferred faster.   Scheduling:  If you have any concerns about today's visit or wish to schedule another appointment please call our office during normal business hours [phone] (8-5:00 M-F)  If a referral was made to a [facility] and you don't get a call from central scheduling please call [phone].  If a Mammogram was ordered for you at [facility] call [phone] to schedule or change your appointment.  If you had an XRay/CT/Ultrasound/MRI ordered the number is [phone] to schedule or change your radiology appointment.   Medical Concerns:  If you have urgent medical concerns please call [phone] at any time of the day.    Kathy Augustin, DO            Follow-ups after your visit        Your next 10 appointments already  scheduled     Oct 02, 2018 10:00 AM CDT   [facility] with Chris Joyce PT   [facility] ([facility]    [address]  [facility] [address]   [phone]            Oct 04, 2018  9:30 AM CDT   [facility] Spine with Chris Joyce PT   [facility] ([facility])    [address]  [facility] [address] [phone]            Oct 08, 2018  9:30 AM CDT   FABIAN Spine with Walter Wright PT   [facility] ([facility]    [address]  [facility] [address] [phone]            Oct 11, 2018  9:30 AM CDT   F[facility] Spine with Walter Wright PT   [facility] ([facility]    [address]  [facility] [address] [phone]            Oct 16, 2018 10:10 AM CDT   FABIAN Spine with Chris Joyce PT   [facility] ([facility])    [address]  [facility] [address] [phone]            Oct 19, 2018  9:30 AM CDT   F[facility] Spine with Chris Joyce PT   [facility] ([facility])    [address]  [facility] [address] [phone]              Who to contact     Please call your clinic at [phone] to:    Ask questions about your health    Make or cancel appointments    Discuss your medicines    Learn about your test results    Speak to your doctor            Additional Information About Your Visit        Care EveryWhere ID     This is your Care EveryWhere ID. This could be used by other organizations to access your [facility] medical records  [id number]        Your Vitals Were     Pulse Temperature Last Period Pulse Oximetry BMI (Body Mass Index)       80 98.2  F (36.8  C) (Oral) 09/26/2018 99% 30.89 kg/m2        Blood Pressure from Last 3 Encounters:   10/01/18 126/85   09/25/18 126/82   09/18/18 (!) 137/97    Weight from Last 3 Encounters:   10/01/18 166 lb 3.2 oz (75.4 kg)    09/25/18 163 lb (73.9 kg)   09/18/18 161 lb 3.2 oz (73.1 kg)              Today, you had the following     No orders found for display       Primary Care Provider Office Phone # Fax #    Baylee Garrido [phone]       [facility] [address]   [facility] [address]        Equal Access to Services     ESPERANZA PEÑA : Hadii aad ku hadasho Soomaali, waaxda luqadaha, qaybta kaalmada adeegyada, waxay idiin hayaan adeeg jazmynsh la'aan ah. So [facility] [phone].    ATENCIÓN: Si habla espmariluz, tiene a gordillo disposición servicios gratuitos de asistencia lingüística. Christineame al [phone].    We comply with applicable federal civil rights laws and [place] laws. We do not discriminate on the basis of race, color, national origin, age, disability, sex, sexual orientation, or gender identity.            Thank you!     Thank you for choosing [facility]  for your care. Our goal is always to provide you with excellent care. Hearing back from our patients is one way we can continue to improve our services. Please take a few minutes to complete the written survey that you may receive in the mail after your visit with us. Thank you!             Your Updated Medication List - Protect others around you: Learn how to safely use, store and throw away your medicines at www.disposemymeds.org.          This list is accurate as of 10/1/18  1:46 PM.  Always use your most recent med list.                   Brand Name Dispense Instructions for use Diagnosis    acetaminophen 500 MG tablet    TYLENOL    100 tablet    Take 2 tablets (1,000 mg) by mouth 3 times daily    Chronic bilateral low back pain with right-sided sciatica       cyclobenzaprine 5 MG tablet    FLEXERIL     Take 5 mg by mouth        gabapentin 300 MG capsule    NEURONTIN     Take 300 mg by mouth        ibuprofen 600 MG tablet    ADVIL/MOTRIN    30 tablet    Take 1 tablet (600 mg) by mouth every 6 hours as needed for  moderate pain    Acute right-sided low back pain with right-sided sciatica       order for DME     1 Device    Equipment being ordered: heating pad    Acute right-sided low back pain with right-sided sciatica       oxyCODONE IR 5 MG tablet    ROXICODONE     Take 5 mg by mouth        predniSONE 20 MG tablet    DELTASONE     TK 1 T PO  BID        traMADol 50 MG tablet    ULTRAM    8 tablet    Take 1 tablet (50 mg) by mouth every 8 hours as needed for pain, moderate to severe pain or severe pain    Chronic bilateral low back pain with right-sided sciatica       traZODone 50 MG tablet    DESYREL     Take 50 mg by mouth

## 2018-10-02 ENCOUNTER — THERAPY VISIT (OUTPATIENT)
Dept: PHYSICAL THERAPY | Facility: CLINIC | Age: 33
End: 2018-10-02
Payer: COMMERCIAL

## 2018-10-02 DIAGNOSIS — M54.41 CHRONIC BILATERAL LOW BACK PAIN WITH RIGHT-SIDED SCIATICA: ICD-10-CM

## 2018-10-02 DIAGNOSIS — G89.29 CHRONIC BILATERAL LOW BACK PAIN WITH RIGHT-SIDED SCIATICA: ICD-10-CM

## 2018-10-02 PROCEDURE — 97110 THERAPEUTIC EXERCISES: CPT | Mod: GP | Performed by: PHYSICAL THERAPIST

## 2018-10-02 PROCEDURE — 97140 MANUAL THERAPY 1/> REGIONS: CPT | Mod: GP | Performed by: PHYSICAL THERAPIST

## 2018-10-04 ENCOUNTER — THERAPY VISIT (OUTPATIENT)
Dept: PHYSICAL THERAPY | Facility: CLINIC | Age: 33
End: 2018-10-04
Payer: COMMERCIAL

## 2018-10-04 DIAGNOSIS — M54.41 CHRONIC BILATERAL LOW BACK PAIN WITH RIGHT-SIDED SCIATICA: ICD-10-CM

## 2018-10-04 DIAGNOSIS — G89.29 CHRONIC BILATERAL LOW BACK PAIN WITH RIGHT-SIDED SCIATICA: ICD-10-CM

## 2018-10-04 PROCEDURE — 97110 THERAPEUTIC EXERCISES: CPT | Mod: GP | Performed by: PHYSICAL THERAPIST

## 2018-10-04 PROCEDURE — 97530 THERAPEUTIC ACTIVITIES: CPT | Mod: GP | Performed by: PHYSICAL THERAPIST

## 2018-10-04 NOTE — MR AVS SNAPSHOT
After Visit Summary   10/4/2018    Tonia Smith    MRN: [id number]           Patient Information     Date Of Birth          1985        Visit Information        Provider Department      10/4/2018 9:30 AM Chris Joyce PT [facility]         Follow-ups after your visit        Your next 10 appointments already scheduled     Oct 01, 2018  1:00 PM CDT   Return Visit with DO [facility] ([facility])    [address] [place] [address],  [address]  [facility] [address]   [phone]            Oct 02, 2018 10:10 AM CDT   [facility] Spine with Chris Joyce PT   [facility] ([facility])    [address]  [facility] [address] [phone]            Oct 04, 2018  9:30 AM CDT   F[facility]IAN Spine with Chris Joyce PT   [facility] ([facility])    [address]  [facility] [address] [phone]            Oct 08, 2018  9:30 AM CDT   F[facility]IAN Spine with Walter Wright PT   [facility] ([facility])    [address]  [facility] [address] [phone]            Oct 11, 2018  9:30 AM CDT   FABIAN Spine with Walter Wright PT   [facility] ([facility])    [address]  [facility] [address] [phone]            Oct 16, 2018 10:10 AM CDT   F[facility] Spine with Chris Joyce PT   [facility] ([facility])    [address]  [facility] [address] [phone]            Oct 19, 2018  9:30 AM CDT   FABIAN Spine with Chris Joyce PT   [facility] ([facility])    [address]  [facility] [address] [phone]              Who to contact     If you have questions or need follow up information about today's clinic visit or your schedule please contact [facility] directly at  [phone].  Normal or non-critical lab and imaging results will be communicated to you by MyChart, letter or phone within 4 business days after the clinic has received the results. If you do not hear from us within 7 days, please contact the clinic through MyChart or phone. If you have a critical or abnormal lab result, we will notify you by phone as soon as possible.  Submit refill requests through [employer] or call your pharmacy and they will forward the refill request to us. Please allow 3 business days for your refill to be completed.          Additional Information About Your Visit        Care EveryWhere ID     This is your Care EveryWhere ID. This could be used by other organizations to access your [facility] medical records  [id number]         Blood Pressure from Last 3 Encounters:   09/25/18 126/82   09/18/18 (!) 137/97   09/13/18 (!) 127/91    Weight from Last 3 Encounters:   09/25/18 73.9 kg (163 lb)   09/18/18 73.1 kg (161 lb 3.2 oz)   09/13/18 73.8 kg (162 lb 12.8 oz)              Today, you had the following     No orders found for display       Primary Care Provider Office Phone # Fax #    Baylee Garrido [phone]       [facility] [address] [place] [address]        Equal Access to Services     ESPERANZA PEÑA : Hadii nakia brewster hadasho Sorubinaali, waaxda luqadaha, qaybta kaalmada sj, briteny clifford. So [facility] [phone].    ATENCIÓN: Si habla español, tiene a gordillo disposición servicios gratuitos de asistencia lingüística. Llame al [phone].    We comply with applicable federal civil rights laws and [place] laws. We do not discriminate on the basis of race, color, national origin, age, disability, sex, sexual orientation, or gender identity.            Thank you!     Thank you for choosing [facility]  for your care. Our goal is always to provide you with excellent care. Hearing back from our  patients is one way we can continue to improve our services. Please take a few minutes to complete the written survey that you may receive in the mail after your visit with us. Thank you!             Your Updated Medication List - Protect others around you: Learn how to safely use, store and throw away your medicines at www.disposemymeds.org.          This list is accurate as of 9/27/18  5:27 PM.  Always use your most recent med list.                   Brand Name Dispense Instructions for use Diagnosis    acetaminophen 500 MG tablet    TYLENOL    100 tablet    Take 2 tablets (1,000 mg) by mouth 3 times daily    Chronic bilateral low back pain with right-sided sciatica       cyclobenzaprine 5 MG tablet    FLEXERIL     Take 5 mg by mouth        gabapentin 300 MG capsule    NEURONTIN     Take 300 mg by mouth        ibuprofen 600 MG tablet    ADVIL/MOTRIN    30 tablet    Take 1 tablet (600 mg) by mouth every 6 hours as needed for moderate pain    Acute right-sided low back pain with right-sided sciatica       order for DME     1 Device    Equipment being ordered: heating pad    Acute right-sided low back pain with right-sided sciatica       oxyCODONE IR 5 MG tablet    ROXICODONE     Take 5 mg by mouth        predniSONE 20 MG tablet    DELTASONE     TK 1 T PO  BID        traMADol 50 MG tablet    ULTRAM    8 tablet    Take 1 tablet (50 mg) by mouth every 8 hours as needed for pain, moderate to severe pain or severe pain    Chronic bilateral low back pain with right-sided sciatica       traZODone 50 MG tablet    DESYREL     Take 50 mg by mouth

## 2018-10-04 NOTE — PROGRESS NOTES
Assessment/Plan:    PROGRESS  REPORT      SUBJECTIVE  Subjective changes noted by patient: : Continued improvement in back and leg pain. States she is only able to do her exercises twice a day as her children like to jump on her when she does them. Walking is getting easier.   Current Pain level: 3/10.      Initial Pain level: 9/10.   Changes in function:  Yes (See Goal flowsheet attached for changes in current functional level)  Adverse reaction to treatment or activity: None    OBJECTIVE  Changes noted in objective findings:  Yes,:    Objective:  -Flexion ROM limited 50%,   -Extension limited 50%.   -R sideglide WNL - decreases symptoms.   -L sideglide 25% limited, some increased pain.   -Centralizes with repeated extension.     Strength: 4-/5 hip flexion, 4/5 knee extension. 5/5 ankle DF/PF.     Outcome Measure: ELENA: 56%  (92% at initial)    ASSESSMENT/PLAN  Updated problem list and treatment plan: Diagnosis 1:  LBP with R radiating pain  Pain -  hot/cold therapy, electric stimulation, mechanical traction, manual therapy, self management, education, directional preference exercise and home program  Decreased ROM/flexibility - manual therapy, therapeutic exercise, therapeutic activity and home program  Decreased joint mobility - manual therapy, therapeutic exercise, therapeutic activity and home program  Decreased strength - therapeutic exercise, therapeutic activities and home program  Impaired gait - gait training and home program  Decreased function - therapeutic activities, home program and functional performance testing  Impaired posture - neuro re-education, therapeutic activities and home program  STG/LTGs have been met or progress has been made towards goals:  Yes (See Goal flow sheet completed today.)  Assessment of Progress: The patient's condition is improving.  Self Management Plans:  Patient has been instructed in a home treatment program.  I have re-evaluated this patient and find that the nature,  scope, duration and intensity of the therapy is appropriate for the medical condition of the patient.  Tonia continues to require the following intervention to meet STG and LTG's:  PT    Recommendations:  This patient would benefit from continued therapy.     Frequency:  2 X week, once daily  Duration:  for 2 weeks tapering to 1 X a week over 6 weeks        Please refer to the daily flowsheet for treatment today, total treatment time and time spent performing 1:1 timed codes.

## 2018-10-07 ASSESSMENT — ENCOUNTER SYMPTOMS
SENSORY CHANGE: 0
HEADACHES: 0
FOCAL WEAKNESS: 0
FALLS: 0
FEVER: 0
WEAKNESS: 0
BACK PAIN: 1

## 2018-10-08 ENCOUNTER — THERAPY VISIT (OUTPATIENT)
Dept: PHYSICAL THERAPY | Facility: CLINIC | Age: 33
End: 2018-10-08
Payer: COMMERCIAL

## 2018-10-08 DIAGNOSIS — M54.41 CHRONIC BILATERAL LOW BACK PAIN WITH RIGHT-SIDED SCIATICA: ICD-10-CM

## 2018-10-08 DIAGNOSIS — G89.29 CHRONIC BILATERAL LOW BACK PAIN WITH RIGHT-SIDED SCIATICA: ICD-10-CM

## 2018-10-08 PROCEDURE — 97112 NEUROMUSCULAR REEDUCATION: CPT | Mod: GP | Performed by: PHYSICAL THERAPIST

## 2018-10-08 PROCEDURE — 97110 THERAPEUTIC EXERCISES: CPT | Mod: GP | Performed by: PHYSICAL THERAPIST

## 2018-10-11 ENCOUNTER — THERAPY VISIT (OUTPATIENT)
Dept: PHYSICAL THERAPY | Facility: CLINIC | Age: 33
End: 2018-10-11
Payer: COMMERCIAL

## 2018-10-11 DIAGNOSIS — M54.41 CHRONIC BILATERAL LOW BACK PAIN WITH RIGHT-SIDED SCIATICA: ICD-10-CM

## 2018-10-11 DIAGNOSIS — G89.29 CHRONIC BILATERAL LOW BACK PAIN WITH RIGHT-SIDED SCIATICA: ICD-10-CM

## 2018-10-11 PROCEDURE — 97112 NEUROMUSCULAR REEDUCATION: CPT | Mod: GP | Performed by: PHYSICAL THERAPIST

## 2018-10-11 PROCEDURE — 97110 THERAPEUTIC EXERCISES: CPT | Mod: GP | Performed by: PHYSICAL THERAPIST

## 2018-10-16 ENCOUNTER — THERAPY VISIT (OUTPATIENT)
Dept: PHYSICAL THERAPY | Facility: CLINIC | Age: 33
End: 2018-10-16
Payer: COMMERCIAL

## 2018-10-16 DIAGNOSIS — M54.41 CHRONIC BILATERAL LOW BACK PAIN WITH RIGHT-SIDED SCIATICA: ICD-10-CM

## 2018-10-16 DIAGNOSIS — G89.29 CHRONIC BILATERAL LOW BACK PAIN WITH RIGHT-SIDED SCIATICA: ICD-10-CM

## 2018-10-16 PROCEDURE — 97112 NEUROMUSCULAR REEDUCATION: CPT | Mod: GP | Performed by: PHYSICAL THERAPIST

## 2018-10-16 PROCEDURE — 97110 THERAPEUTIC EXERCISES: CPT | Mod: GP | Performed by: PHYSICAL THERAPIST

## 2018-10-30 ENCOUNTER — THERAPY VISIT (OUTPATIENT)
Dept: PHYSICAL THERAPY | Facility: CLINIC | Age: 33
End: 2018-10-30
Payer: COMMERCIAL

## 2018-10-30 DIAGNOSIS — G89.29 CHRONIC BILATERAL LOW BACK PAIN WITH RIGHT-SIDED SCIATICA: ICD-10-CM

## 2018-10-30 DIAGNOSIS — M54.41 CHRONIC BILATERAL LOW BACK PAIN WITH RIGHT-SIDED SCIATICA: ICD-10-CM

## 2018-10-30 PROCEDURE — 97112 NEUROMUSCULAR REEDUCATION: CPT | Mod: GP | Performed by: PHYSICAL THERAPIST

## 2018-10-30 PROCEDURE — 97110 THERAPEUTIC EXERCISES: CPT | Mod: GP | Performed by: PHYSICAL THERAPIST

## 2018-11-27 ENCOUNTER — THERAPY VISIT (OUTPATIENT)
Dept: PHYSICAL THERAPY | Facility: CLINIC | Age: 33
End: 2018-11-27
Payer: COMMERCIAL

## 2018-11-27 DIAGNOSIS — G89.29 CHRONIC BILATERAL LOW BACK PAIN WITH RIGHT-SIDED SCIATICA: ICD-10-CM

## 2018-11-27 DIAGNOSIS — M54.41 CHRONIC BILATERAL LOW BACK PAIN WITH RIGHT-SIDED SCIATICA: ICD-10-CM

## 2018-11-27 PROCEDURE — 97112 NEUROMUSCULAR REEDUCATION: CPT | Mod: GP | Performed by: PHYSICAL THERAPIST

## 2018-11-27 PROCEDURE — 97530 THERAPEUTIC ACTIVITIES: CPT | Mod: GP | Performed by: PHYSICAL THERAPIST

## 2018-11-27 PROCEDURE — 97110 THERAPEUTIC EXERCISES: CPT | Mod: GP | Performed by: PHYSICAL THERAPIST

## 2019-03-15 ENCOUNTER — OFFICE VISIT (OUTPATIENT)
Dept: FAMILY MEDICINE | Facility: CLINIC | Age: 34
End: 2019-03-15
Payer: COMMERCIAL

## 2019-03-15 VITALS
BODY MASS INDEX: 32.34 KG/M2 | DIASTOLIC BLOOD PRESSURE: 89 MMHG | SYSTOLIC BLOOD PRESSURE: 123 MMHG | WEIGHT: 174 LBS | OXYGEN SATURATION: 97 % | HEART RATE: 90 BPM

## 2019-03-15 DIAGNOSIS — M54.41 CHRONIC BILATERAL LOW BACK PAIN WITH RIGHT-SIDED SCIATICA: ICD-10-CM

## 2019-03-15 DIAGNOSIS — G89.29 CHRONIC BILATERAL LOW BACK PAIN WITH RIGHT-SIDED SCIATICA: ICD-10-CM

## 2019-03-15 DIAGNOSIS — Z32.00 POSSIBLE PREGNANCY: Primary | ICD-10-CM

## 2019-03-15 LAB — HCG UR QL: NEGATIVE

## 2019-03-15 RX ORDER — PRENATAL VIT/IRON FUM/FOLIC AC 27MG-0.8MG
1 TABLET ORAL DAILY
Qty: 90 TABLET | Refills: 3 | Status: SHIPPED | OUTPATIENT
Start: 2019-03-15 | End: 2019-08-16

## 2019-03-15 RX ORDER — ACETAMINOPHEN 500 MG
1000 TABLET ORAL 3 TIMES DAILY
Qty: 100 TABLET | Refills: 0 | Status: SHIPPED | OUTPATIENT
Start: 2019-03-15 | End: 2019-08-16

## 2019-03-15 ASSESSMENT — ENCOUNTER SYMPTOMS
VOMITING: 0
DYSURIA: 0
LIGHT-HEADEDNESS: 0
FEVER: 0
CONSTIPATION: 0
DIARRHEA: 0
ABDOMINAL PAIN: 1
FATIGUE: 0
NAUSEA: 1
BACK PAIN: 1
DIZZINESS: 0

## 2019-03-15 NOTE — PROGRESS NOTES
HPI       Tonia Smith is a 34 year old  who presents for   Chief Complaint   Patient presents with     Pregnancy Test       Pregnancy Test/Confirmation      oTnia is a34 year old Woman,  with a significant past medical history of CS x 2, preeclampsia with severe features leading to  IOL and  delivery by CS (failed induction), and h/o gestational hypertension who presents requesting a pregnancy test.   Her Patient's last menstrual period was 2019. sure.Previous normal periods: Yes  Last unprotected intercourse on 3/13/2019   Contraceptive method : none  Symptoms of pregnancy: nausea and sensation that there is a baby kicking in side  Any Bleeding since LMP? no    If pregnant, pregnancy is Planned, Desired    Reviewed patients medications on her list right now and is not taking any of the meds.     A [employer]  was used for  this visit.    +++++++    Problem, Medication and Allergy Lists were reviewed and updated if needed..    Patient is an established patient of this clinic..         Review of Systems:   Review of Systems   Constitutional: Negative for fatigue and fever.   Gastrointestinal: Positive for abdominal pain (LUQ) and nausea. Negative for constipation, diarrhea and vomiting.   Genitourinary: Negative for dysuria, menstrual problem, vaginal bleeding and vaginal discharge.   Musculoskeletal: Positive for back pain.   Neurological: Negative for dizziness and light-headedness.            Physical Exam:     Vitals:    03/15/19 0833   BP: 123/89   Pulse: 90   SpO2: 97%   Weight: 78.9 kg (174 lb)     Body mass index is 32.34 kg/m .  Vitals were reviewed and were normal     Physical Exam   Constitutional: No distress.   HENT:   Head: Normocephalic and atraumatic.   Pulmonary/Chest: Effort normal.   Abdominal: Soft. She exhibits no distension. There is no tenderness.   Neurological: She is alert.   Skin: She is not diaphoretic.   Psychiatric: She has a normal mood and  affect.   Slightly flat affect       Results:      Results from this visit  Results for orders placed or performed in visit on 03/15/19   HCG Qualitative Urine (UPT) (Joses)   Result Value Ref Range    HCG Qual Urine NEGATIVE Negative     Assessment and Plan        1. Possible pregnancy  Pregnancy test today is negative. Patient is currently trying to become pregnant. Because of this, I prescribed prenatal vitamins for patient to start taking. I also reviewed her medications and removed medications she is no longer taking. She does have back pain and I recommended she use tylenol for pain control and avoid ibuprofen while trying to become pregnant. She will return to clinic for a pregnancy test if her next period is late. If she were to become pregnant, we discussed that she will be a high risk pregnancy due to h/o  delivery 2/2 IOL for preeclampsia with severe features, h/o gestational hypertension, h/o CS x 2 and AMA.   - HCG Qualitative Urine (UPT) (Amy)  - Prenatal Vit-Fe Fumarate-FA (PRENATAL MULTIVITAMIN W/IRON) 27-0.8 MG tablet; Take 1 tablet by mouth daily  Dispense: 90 tablet; Refill: 3    2. Chronic bilateral low back pain with right-sided sciatica  Ordered acetaminophen for patient to take for pain control while trying to become pregnant.   - acetaminophen (TYLENOL) 500 MG tablet; Take 2 tablets (1,000 mg) by mouth 3 times daily  Dispense: 100 tablet; Refill: 0       Medications Discontinued During This Encounter   Medication Reason     acetaminophen (TYLENOL) 500 MG tablet Reorder     cyclobenzaprine (FLEXERIL) 5 MG tablet Medication Reconciliation Clean Up     gabapentin (NEURONTIN) 300 MG capsule Medication Reconciliation Clean Up     ibuprofen (ADVIL/MOTRIN) 600 MG tablet Medication Reconciliation Clean Up     oxyCODONE IR (ROXICODONE) 5 MG tablet Medication Reconciliation Clean Up     predniSONE (DELTASONE) 20 MG tablet Medication Reconciliation Clean Up     traMADol (ULTRAM) 50 MG  tablet Medication Reconciliation Clean Up     traZODone (DESYREL) 50 MG tablet Medication Reconciliation Clean Up     order for DME Medication Reconciliation Clean Up       Options for treatment and follow-up care were reviewed with the patient. Tonia Smith  engaged in the decision making process and verbalized understanding of the options discussed and agreed with the final plan.    Katiuska Garrido MD  Family Medicine PGY3 Resident

## 2019-03-15 NOTE — PROGRESS NOTES
Preceptor Attestation:   Patient seen, evaluated and discussed with the resident. I have verified the content of the note, which accurately reflects my assessment of the patient and the plan of care.   Supervising Physician:  Radha Cordova MD

## 2019-03-15 NOTE — NURSING NOTE
Due to patient being non-English speaking/uses sign language, an  was used for this visit. Only for face-to-face interpretation by an external agency, date and length of interpretation can be found on the scanned worksheet.     name: warren pepper  Agency: [employer]  Language: Venezuelan   Telephone number: [phone]  Type of interpretation: Face-to-face, spoken

## 2019-05-30 PROBLEM — M54.41 BILATERAL LOW BACK PAIN WITH RIGHT-SIDED SCIATICA: Status: RESOLVED | Noted: 2018-08-24 | Resolved: 2019-05-30

## 2019-05-30 NOTE — PROGRESS NOTES
Patient did not return for further treatment and no additional progress was noted.  Please refer to the progress note and goal flowsheet completed on 10/04/18 for discharge information.

## 2019-08-16 ENCOUNTER — OFFICE VISIT (OUTPATIENT)
Dept: FAMILY MEDICINE | Facility: CLINIC | Age: 34
End: 2019-08-16
Payer: COMMERCIAL

## 2019-08-16 VITALS
DIASTOLIC BLOOD PRESSURE: 85 MMHG | OXYGEN SATURATION: 99 % | SYSTOLIC BLOOD PRESSURE: 134 MMHG | HEART RATE: 83 BPM | TEMPERATURE: 97.8 F | BODY MASS INDEX: 33.76 KG/M2 | WEIGHT: 181.6 LBS

## 2019-08-16 DIAGNOSIS — M99.00 SOMATIC DYSFUNCTION OF HEAD REGION: ICD-10-CM

## 2019-08-16 DIAGNOSIS — J06.9 VIRAL UPPER RESPIRATORY TRACT INFECTION: Primary | ICD-10-CM

## 2019-08-16 DIAGNOSIS — R05.9 COUGH: ICD-10-CM

## 2019-08-16 DIAGNOSIS — M99.01 SOMATIC DYSFUNCTION OF CERVICAL REGION: ICD-10-CM

## 2019-08-16 ASSESSMENT — ENCOUNTER SYMPTOMS
CHILLS: 0
COUGH: 1
NECK STIFFNESS: 1
FEVER: 0

## 2019-08-16 NOTE — NURSING NOTE
Due to patient being non-English speaking/uses sign language, an  was used for this visit. Only for face-to-face interpretation by an external agency, date and length of interpretation can be found on the scanned worksheet.     name: Fe Us  Language: Amaharic  Agency: [employer]  Phone number: [phone]  Type of interpretation: Face-to-face, spoken

## 2019-08-16 NOTE — PROGRESS NOTES
COMPA Smith is a 34 year old  who presents for   Chief Complaint   Patient presents with     Pharyngitis     cough x 1 week and sore throat x 2 days       Acute Illness   Concerns: sore throat  When did it start? 1 week  Is it getting better, worse or staying the same? unchanged    Fatigue/Achiness?:No     Fever?: No     Chills/Sweats?: No     Headache (location?)No     Sinus Pressure?:No     Eye redness/Discharge?: No     Ear Pain?: yes, bilat     Runny nose?: No     Congestion?: No     Sore Throat?:  YES   Respiratory    Cough?:  YES-non-productive    Wheeze?: No   GI/    Decreased Appetite?: No     Nausea?:No     Vomiting?: No     Diarrhea?:  No     Dysuria/Frequency?.:No       Any Illness Exposure?: No     Any foreign travel or contact with anyone ill who travelled abroad? No     Therapies Tried and outcome: Nothing      An [place] PREVENTIVE MEDICINE  was used for  this visit.    +++++++    Problem, Medication and Allergy Lists were reviewed and updated if needed..    Patient is an established patient of this clinic..         Review of Systems:   Review of Systems   Constitutional: Negative for chills and fever.   Respiratory: Positive for cough.    Musculoskeletal: Positive for neck stiffness.            Physical Exam:     Vitals:    08/16/19 1628 08/16/19 1629   BP: (!) 135/91 134/85   Pulse: 83    Temp: 97.8  F (36.6  C)    TempSrc: Oral    SpO2: 99%    Weight: 82.4 kg (181 lb 9.6 oz)      Body mass index is 33.76 kg/m .  Vitals were reviewed and were normal     Physical Exam   Constitutional: She is oriented to person, place, and time. She appears well-developed and well-nourished.   HENT:   Head: Normocephalic and atraumatic.   Mouth/Throat: Oropharynx is clear and moist. No oropharyngeal exudate.   Eyes: EOM are normal. No scleral icterus.   Cardiovascular: Normal rate, regular rhythm, normal heart sounds and intact distal pulses.   Pulmonary/Chest: Effort normal and breath sounds normal. No  respiratory distress.   Neurological: She is alert and oriented to person, place, and time.   Psychiatric: She has a normal mood and affect. Her behavior is normal.         Results:   No testing ordered today    Assessment and Plan        Tonia was seen today for pharyngitis.    Diagnoses and all orders for this visit:    Viral upper respiratory tract infection  Cough  Reassuring exam and vitals. Likely viral URI, no exposures to strep. Given that this has been interfering with the patient's quality of life, after discussion and informed consent, we have together decided to address symptoms with Osteopathic Manipulative Treatment.    Somatic dysfunction of cervical region  Somatic dysfunction of head region  -     OSTEOPATHIC MANIP,1-2 BODY REGN    Please see OMT Procedure Note below for the specifics of treatment.       Medications Discontinued During This Encounter   Medication Reason     acetaminophen (TYLENOL) 500 MG tablet      Prenatal Vit-Fe Fumarate-FA (PRENATAL MULTIVITAMIN W/IRON) 27-0.8 MG tablet        Options for treatment and follow-up care were reviewed with the patient. Tonia Smith  engaged in the decision making process and verbalized understanding of the options discussed and agreed with the final plan.    Gilbert Stallworth, DO       OMT PROCEDURE NOTE    Body Region: Head, Face, and Jaw  Somatic Dysfunction: Lymphatic drainage congestion  Treatment: Lymphatic and (with Thoracic Duct opening and closing) Techniques.  Outcome: Improved    Body Region: C-spine / Neck  Somatic Dysfunction: paraspinal hypertonicity  Treatment: Lymphatic, (with Thoracic Duct opening and closing) and Soft Tissue Techniques.  Outcome: Improved      The patient actively participated in OMT and was able to communicate both positive and negative feedback throughout. OMT completed without incident. Patient tolerated treatment well. Patient reported that ROM, function, and/or pain level were improved.    Advised that pain is  occasionally worse during the first 24 hours after treatment and that drinking more water and taking Tylenol or Ibuprofen often help. Patient to return as needed for additional OMT.     Gilbert Stallworth DO, MA  Family Medicine PGY-3  [facility], \[facility] [place] [facility]\"" Family Medicine   Pager: [phone]

## 2019-08-16 NOTE — PROGRESS NOTES
Preceptor Attestation:   Patient seen, evaluated and discussed with the resident. I have verified the content of the note, which accurately reflects my assessment of the patient and the plan of care.   Supervising Physician:  Felix Solorzano MD

## 2019-11-05 ENCOUNTER — OFFICE VISIT (OUTPATIENT)
Dept: FAMILY MEDICINE | Facility: CLINIC | Age: 34
End: 2019-11-05
Payer: COMMERCIAL

## 2019-11-05 VITALS
OXYGEN SATURATION: 100 % | WEIGHT: 175.6 LBS | DIASTOLIC BLOOD PRESSURE: 85 MMHG | RESPIRATION RATE: 16 BRPM | HEIGHT: 63 IN | HEART RATE: 76 BPM | BODY MASS INDEX: 31.11 KG/M2 | TEMPERATURE: 97.9 F | SYSTOLIC BLOOD PRESSURE: 119 MMHG

## 2019-11-05 DIAGNOSIS — N93.0 PCB (POST COITAL BLEEDING): ICD-10-CM

## 2019-11-05 DIAGNOSIS — N76.0 BV (BACTERIAL VAGINOSIS): ICD-10-CM

## 2019-11-05 DIAGNOSIS — Z31.9 DESIRE FOR PREGNANCY: ICD-10-CM

## 2019-11-05 DIAGNOSIS — L83 ACANTHOSIS NIGRICANS: ICD-10-CM

## 2019-11-05 DIAGNOSIS — N76.0 VAGINITIS AND VULVOVAGINITIS: ICD-10-CM

## 2019-11-05 DIAGNOSIS — R30.0 DYSURIA: ICD-10-CM

## 2019-11-05 DIAGNOSIS — N89.8 VAGINAL ITCHING: ICD-10-CM

## 2019-11-05 DIAGNOSIS — Z00.00 ROUTINE GENERAL MEDICAL EXAMINATION AT A HEALTH CARE FACILITY: Primary | ICD-10-CM

## 2019-11-05 DIAGNOSIS — B96.89 BV (BACTERIAL VAGINOSIS): ICD-10-CM

## 2019-11-05 LAB
BACTERIA: NORMAL
BILIRUBIN UR: NEGATIVE MG/DL
BLOOD UR: ABNORMAL MG/DL
CHOLEST SERPL-MCNC: 144.5 MG/DL (ref 0–200)
CHOLEST/HDLC SERPL: 4.1 {RATIO} (ref 0–5)
CLUE CELLS: NORMAL
GLUCOSE URINE: NEGATIVE
HBA1C MFR BLD: 5.3 % (ref 4.1–5.7)
HDLC SERPL-MCNC: 35.1 MG/DL
KETONES UR QL: NEGATIVE MG/DL
LDLC SERPL CALC-MCNC: 74 MG/DL (ref 0–129)
LEUKOCYTE ESTERASE UR: ABNORMAL
MOTILE TRICHOMONAS: NEGATIVE
NITRITE UR QL STRIP: NEGATIVE MG/DL
ODOR: POSITIVE
PH UR STRIP: 5.5 [PH] (ref 4.5–8)
PH WET PREP: >4.5 (ref 3.8–4.5)
PROTEIN UR: NEGATIVE MG/DL
SP GR UR STRIP: 1.02 (ref 1–1.03)
TRIGL SERPL-MCNC: 175.3 MG/DL (ref 0–150)
UROBILINOGEN UR STRIP-ACNC: ABNORMAL E.U./DL
VLDL CHOLESTEROL: 35.1 MG/DL (ref 7–32)
WBC WET PREP: NORMAL (ref 2–5)
YEAST: NORMAL

## 2019-11-05 RX ORDER — TRIAMCINOLONE ACETONIDE 1 MG/G
CREAM TOPICAL 2 TIMES DAILY
Qty: 15 G | Refills: 0 | Status: SHIPPED | OUTPATIENT
Start: 2019-11-05 | End: 2021-11-02

## 2019-11-05 RX ORDER — PRENATAL VIT/IRON FUM/FOLIC AC 27MG-0.8MG
1 TABLET ORAL DAILY
Qty: 90 TABLET | Refills: 3 | Status: SHIPPED | OUTPATIENT
Start: 2019-11-05 | End: 2020-03-03

## 2019-11-05 ASSESSMENT — ANXIETY QUESTIONNAIRES
6. BECOMING EASILY ANNOYED OR IRRITABLE: NOT AT ALL
2. NOT BEING ABLE TO STOP OR CONTROL WORRYING: NOT AT ALL
IF YOU CHECKED OFF ANY PROBLEMS ON THIS QUESTIONNAIRE, HOW DIFFICULT HAVE THESE PROBLEMS MADE IT FOR YOU TO DO YOUR WORK, TAKE CARE OF THINGS AT HOME, OR GET ALONG WITH OTHER PEOPLE: NOT DIFFICULT AT ALL
7. FEELING AFRAID AS IF SOMETHING AWFUL MIGHT HAPPEN: NOT AT ALL
3. WORRYING TOO MUCH ABOUT DIFFERENT THINGS: NOT AT ALL
GAD7 TOTAL SCORE: 0
5. BEING SO RESTLESS THAT IT IS HARD TO SIT STILL: NOT AT ALL
1. FEELING NERVOUS, ANXIOUS, OR ON EDGE: NOT AT ALL

## 2019-11-05 ASSESSMENT — MIFFLIN-ST. JEOR: SCORE: 1454.26

## 2019-11-05 ASSESSMENT — PATIENT HEALTH QUESTIONNAIRE - PHQ9
5. POOR APPETITE OR OVEREATING: NOT AT ALL
SUM OF ALL RESPONSES TO PHQ QUESTIONS 1-9: 0

## 2019-11-05 ASSESSMENT — PAIN SCALES - GENERAL: PAINLEVEL: MODERATE PAIN (4)

## 2019-11-05 NOTE — PROGRESS NOTES
Preceptor Attestation:   Patient seen, evaluated and discussed with the resident. I have verified the content of the note, which accurately reflects my assessment of the patient and the plan of care.   Supervising Physician:  Casey Guerra MD.

## 2019-11-05 NOTE — PROGRESS NOTES
"Female Physical Note          HPI         Concerns today: {SMI CONCERNS:481323}  {:254202}    Patient Active Problem List   Diagnosis     Health Care Home     Female circumcision     S/P  section     Headache     History of severe pre-eclampsia     Lumbago     Elevated blood pressure reading without diagnosis of hypertension       Past Medical History:   Diagnosis Date     Hypertension      Preeclampsia        Previous Medical Care   ***     Family History   Problem Relation Age of Onset     Family History Negative Other               Review of Systems:     Review of Systems:  {ROS NORMAL:590255::\"CONSTITUTIONAL: NEGATIVE for fever, chills, change in weight\",\"INTEGUMENTARY/SKIN: NEGATIVE for worrisome rashes, moles or lesions\",\"EYES: NEGATIVE for vision changes or irritation\",\"ENT/MOUTH: NEGATIVE for ear, mouth and throat problems\",\"RESP: NEGATIVE for significant cough or SOB\",\"BREAST: NEGATIVE for masses, tenderness or discharge\",\"CV: NEGATIVE for chest pain, palpitations or peripheral edema\",\"GI: NEGATIVE for nausea, abdominal pain, heartburn, or change in bowel habits\",\": NEGATIVE for frequency, dysuria, or hematuria\",\"MUSCULOSKELETAL: NEGATIVE for significant arthralgias or myalgia\",\"NEURO: NEGATIVE for weakness, dizziness or paresthesias\",\"ENDOCRINE: NEGATIVE for temperature intolerance, skin/hair changes\",\"HEME/ALLERGY: NEGATIVE for bleeding problems\",\"PSYCHIATRIC: NEGATIVE for changes in mood or affect\"}  Sleep:   Do you snore most or the night (as reported by a family member)? {NO IS DEFAULT/YES:26229260::\"No\"}  Do you feel sleepy or extremely tired during most of the day? {NO IS DEFAULT/YES:03084581::\"No\"}  {If both questions are answered with \"yes\" consider evaluating the patient for PRIYANKA with the dot phrase \".smics\" either this visit or at a follow up visit }           Social History     Social History     Socioeconomic History     Marital status:      Spouse name: Not on file " "    Number of children: 0     Years of education: 9th grade     Highest education level: Not on file   Occupational History     Occupation: labor     Employer: [employer]   Social Needs     Financial resource strain: Not on file     Food insecurity:     Worry: Not on file     Inability: Not on file     Transportation needs:     Medical: Not on file     Non-medical: Not on file   Tobacco Use     Smoking status: Never Smoker     Smokeless tobacco: Never Used   Substance and Sexual Activity     Alcohol use: No     Drug use: No     Sexual activity: Yes     Partners: Male     Birth control/protection: None   Lifestyle     Physical activity:     Days per week: Not on file     Minutes per session: Not on file     Stress: Not on file   Relationships     Social connections:     Talks on phone: Not on file     Gets together: Not on file     Attends Spiritism service: Not on file     Active member of club or organization: Not on file     Attends meetings of clubs or organizations: Not on file     Relationship status: Not on file     Intimate partner violence:     Fear of current or ex partner: Not on file     Emotionally abused: Not on file     Physically abused: Not on file     Forced sexual activity: Not on file   Other Topics Concern      Service No     Blood Transfusions No     Caffeine Concern No     Occupational Exposure No     Hobby Hazards No     Sleep Concern No     Stress Concern No     Weight Concern No     Special Diet No     Back Care No     Exercise No     Bike Helmet No     Seat Belt No     Self-Exams No     Parent/sibling w/ CABG, MI or angioplasty before 65F 55M? Not Asked   Social History Narrative     Not on file       Marital Status:{MARITAL STATUS:365662}  Who lives in your household? ***    Has anyone hurt you physically, for example by pushing, hitting, slapping or kicking you or forcing you to have sex? {SMI DENIES:385132::\"Denies\"}  Do you feel threatened or controlled by a " "partner, ex-partner or anyone in your life? {[facility] DENIES:419965::\"Denies\"}    Sexual Health     Sexual concerns: {YES / NO:580669::\"Yes\"}   STI History: {NEG/POS-NEG DEFAULT:114212::\"Neg\"}  Pregnancy History:   LMP Patient's last menstrual period was 10/14/2019. {[facility] LMP:660183}  Last Pap Smear Date:   Lab Results   Component Value Date    PAP NIL 10/11/2016    PAP NIL 2013     Abnormal Pap History: {[facility] ABNORMAL PAP:192577}    Recommended Screening     {[facility] USPSTF LEVEL A:685428}  {[facility] USPSTF LEVEL B:100891}  {B-RST BRCA- Risk Tool}         Physical Exam:     Vitals: /85   Pulse 76   Temp 97.9  F (36.6  C) (Oral)   Resp 16   Ht 1.59 m (5' 2.6\")   Wt 79.7 kg (175 lb 9.6 oz)   LMP 10/14/2019   SpO2 100%   Breastfeeding? No   BMI 31.51 kg/m    BMI= Body mass index is 31.51 kg/m .   {EXAM - FEMALE- zebra stripe:056486::\"GENERAL: healthy, alert and no distress\",\"EYES: Eyes grossly normal to inspection, extraocular movements - intact, and PERRL\",\"HENT: ear canals- normal; TMs- normal; Nose- normal; Mouth- no ulcers, no lesions\",\"NECK: no tenderness, no adenopathy, no asymmetry, no masses, no stiffness; thyroid- normal to palpation\",\"RESP: lungs clear to auscultation - no rales, no rhonchi, no wheezes\",\"BREAST: no masses, no tenderness, no nipple discharge, no palpable axillary masses or adenopathy\",\"CV: regular rates and rhythm, normal S1 S2, no S3 or S4 and no murmur, no click or rub -\",\"ABDOMEN: soft, no tenderness, no  hepatosplenomegaly, no masses, normal bowel sounds\",\"MS: extremities- no gross deformities noted, no edema\",\"SKIN: no suspicious lesions, no rashes\",\"NEURO: strength and tone- normal, sensory exam- grossly normal, mentation- intact, speech- normal, reflexes- symmetric\",\"BACK: no CVA tenderness, no paralumbar tenderness\",\"- female: cervix- normal, adnexae- normal; uterus- normal, no masses, no discharge\",\"RECTAL- female: no masses, no hemorrhoids\",\"PSYCH: Alert and oriented " "times 3; speech- coherent , normal rate and volume; able to articulate logical thoughts, able to abstract reason, no tangential thoughts, no hallucinations or delusions, affect- normal\",\"LYMPHATICS: ant. cervical- normal, post. cervical- normal, axillary- normal, supraclavicular- normal, inguinal- normal\"}      Assessment and Plan      There are no diagnoses linked to this encounter.    {[facility] FEMALE ASSESSMENT:963264::\"1. Health Care Maintenance: Normal Physical Exam\"}      1. {[facility] FEMALE PLAN 1:136898}  2.Contraception: {[facility] CONTRACEPTION:912109}    Options for treatment and follow-up care were reviewed with the patient . Tonia Smith and/or guardian engaged in the decision making process and verbalized understanding of the options discussed and agreed with the final plan.    Jillian Chavez MD  "

## 2019-11-05 NOTE — PROGRESS NOTES
Female Physical Note          HPI         Concerns today: vaginal concerns:    Vaginal and pubic itching and burning discomfort. Comes and goes for past month. Also noticed post-coital vaginal bleeding same duration. New never before. No STI history. Pap history:   Lab Results   Component Value Date    PAP NIL 10/11/2016    PAP NIL 2013     LMP 10/7/2019-10/14/19, T1ansor, no changes  Some pain with sex  No new partners,  and monogamous.   Denies chance of pregnancy, would like to become pregnant.   Does shave frequently  No burning with urination.   No discharge, no odor    An [employer]  was used for  this visit.     Patient Active Problem List   Diagnosis     Health Care Home     Female circumcision     S/P  section     Headache     History of severe pre-eclampsia     Lumbago     Elevated blood pressure reading without diagnosis of hypertension       Past Medical History:   Diagnosis Date     Hypertension      Preeclampsia        Previous Medical Care   Here at [facility]     Family History   Problem Relation Age of Onset     Family History Negative Other               Review of Systems:     Review of Systems:  CONSTITUTIONAL: NEGATIVE for fever, chills, change in weight  INTEGUMENTARY/SKIN: NEGATIVE for worrisome rashes, moles or lesions  EYES: NEGATIVE for vision changes or irritation  ENT/MOUTH: NEGATIVE for ear, mouth and throat problems  RESP: NEGATIVE for significant cough or SOB  BREAST: NEGATIVE for masses, tenderness or discharge  CV: NEGATIVE for chest pain, palpitations or peripheral edema  GI: NEGATIVE for nausea, abdominal pain, heartburn, or change in bowel habits  : NEGATIVE for frequency, dysuria, or hematuria. Positive for post-coital vaginal bleeding  MUSCULOSKELETAL: NEGATIVE for significant arthralgias or myalgia  NEURO: NEGATIVE for weakness, dizziness or paresthesias  ENDOCRINE: NEGATIVE for temperature intolerance, skin/hair changes  HEME/ALLERGY: NEGATIVE for  bleeding problems  PSYCHIATRIC: NEGATIVE for changes in mood or affect  Sleep:   Do you snore most or the night (as reported by a family member)? No  Do you feel sleepy or extremely tired during most of the day? No             Social History     Social History     Socioeconomic History     Marital status:      Spouse name: Not on file     Number of children: 0     Years of education: 9th grade     Highest education level: Not on file   Occupational History     Occupation: labor     Employer: [employer]   Social Needs     Financial resource strain: Not on file     Food insecurity:     Worry: Not on file     Inability: Not on file     Transportation needs:     Medical: Not on file     Non-medical: Not on file   Tobacco Use     Smoking status: Never Smoker     Smokeless tobacco: Never Used   Substance and Sexual Activity     Alcohol use: No     Drug use: No     Sexual activity: Yes     Partners: Male     Birth control/protection: None   Lifestyle     Physical activity:     Days per week: Not on file     Minutes per session: Not on file     Stress: Not on file   Relationships     Social connections:     Talks on phone: Not on file     Gets together: Not on file     Attends Voodoo service: Not on file     Active member of club or organization: Not on file     Attends meetings of clubs or organizations: Not on file     Relationship status: Not on file     Intimate partner violence:     Fear of current or ex partner: Not on file     Emotionally abused: Not on file     Physically abused: Not on file     Forced sexual activity: Not on file   Other Topics Concern      Service No     Blood Transfusions No     Caffeine Concern No     Occupational Exposure No     Hobby Hazards No     Sleep Concern No     Stress Concern No     Weight Concern No     Special Diet No     Back Care No     Exercise No     Bike Helmet No     Seat Belt No     Self-Exams No     Parent/sibling w/ CABG, MI or angioplasty  "before 65F 55M? Not Asked   Social History Narrative     Not on file       Marital Status:  Who lives in your household? Her and spouse      Sexual Health     Sexual concerns: see HPI   STI History: Neg  Pregnancy History:   LMP Patient's last menstrual period was 10/14/2019.   Last Pap Smear Date:   Lab Results   Component Value Date    PAP NIL 10/11/2016    PAP NIL 2013     Abnormal Pap History: None    Recommended Screening     Pap/HPV cotest every 5 years for women 30-65   Testing not indicated  and HIV screening:  Previously performed () and negative           Physical Exam:     Vitals: /85   Pulse 76   Temp 97.9  F (36.6  C) (Oral)   Resp 16   Ht 1.59 m (5' 2.6\")   Wt 79.7 kg (175 lb 9.6 oz)   LMP 10/14/2019   SpO2 100%   Breastfeeding? No   BMI 31.51 kg/m    BMI= Body mass index is 31.51 kg/m .   GENERAL: healthy, alert and no distress  EYES: Eyes grossly normal to inspection, extraocular movements - intact  NECK: no tenderness, no adenopathy, no asymmetry, no masses, no stiffness; thyroid- normal to palpation  RESP: lungs clear to auscultation - no rales, no rhonchi, no wheezes  CV: regular rates and rhythm, normal S1 S2, no S3 or S4 and no murmur, no click or rub -  ABDOMEN: soft, no tenderness, no  hepatosplenomegaly, no masses, normal bowel sounds  MS: extremities- no gross deformities noted, no edema  SKIN: no suspicious lesions, no rashes. Acanthosis nigricans bilateral axilla and inguinal folds  NEURO: strength and tone- normal, sensory exam- grossly normal, mentation- intact, speech- normal, reflexes- symmetric  : no labial, vaginal or cervical lacerations or lesions. Does have small amount of dark red blood coming from closed cervical os. No discharge otherwise. No CMT. No lesions or masses.  adnexae- normal. Pubic area shaves, non erythematous, no lesions  PSYCH: Alert and oriented times 3; speech- coherent , normal rate and volume; able to articulate logical " thoughts, able to abstract reason, no tangential thoughts, no hallucinations or delusions, affect- normal      Assessment and Plan      Tonia was seen today for physical.    Diagnoses and all orders for this visit:    Routine general medical examination at a health care facility  -     Lipid Panel ([facility])    PCB (post coital bleeding)  Vaginal itching  Vaginitis and vulvovaginitis  1 month of post coital bleeding in menstruating female with previous normal paps and no STI history in monogamous relationship and otherwise healthy. No lesions on exam, blood from cervical os- likely start of period, but otherwise no discharge. Concern for STI vs BV vs polyp vs friable cervix (although no friability on exam), AUB vs cancer, vs hematospermia (vs other causes of bleeding in partner), etc. Will test for gc/chlam, trich ad BV today. Not due for cytology. Treat pubic itching with topical steroid for now, recommend no shaving until symptoms resolve.   -     triamcinolone (KENALOG) 0.1 % external cream; Apply topically 2 times daily Apply to pubic area for itching  -     Urinalysis (UA) ([facility])  -     Wet Prep ([facility])  -     Neisseria gonorrhoeae PCR  -     Chlamydia trachomatis PCR    Results:   Wet prep with pH >4.5 + positive whiff test. No clue cells. No trich.  GC, Chlam negative.     Despite not meeting amsel criteria, will treat with metronidazole gel for potential BV given symptoms. If persists will need close follow-up for continued work-up      Acanthosis nigricans  -     Hemoglobin A1c ([facility])    Desire for pregnancy  -     Prenatal Vit-Fe Fumarate-FA (PRENATAL MULTIVITAMIN W/IRON) 27-0.8 MG tablet; Take 1 tablet by mouth daily    BV (bacterial vaginosis)  -     metroNIDAZOLE (METROGEL) 0.75 % vaginal gel; Place 1 applicator (5 g) vaginally daily for 5 days      Options for treatment and follow-up care were reviewed with the patient . Tonia Smith and/or guardian engaged in the decision making process and  verbalized understanding of the options discussed and agreed with the final plan.    Jillian Chavez MD

## 2019-11-05 NOTE — NURSING NOTE
Due to patient being non-English speaking/uses sign language, an  was used for this visit. Only for face-to-face interpretation by an external agency, date and length of interpretation can be found on the scanned worksheet.     name: Yuliabdulaziz Espinal  Language: Korean  Agency: [facility]   Phone number: [phone]  Type of interpretation: Face-to-face, spoken        Jayashree Reddy CMA

## 2019-11-06 LAB
C TRACH DNA SPEC QL NAA+PROBE: NEGATIVE
N GONORRHOEA DNA SPEC QL NAA+PROBE: NEGATIVE
SPECIMEN SOURCE: NORMAL
SPECIMEN SOURCE: NORMAL

## 2019-11-06 ASSESSMENT — ANXIETY QUESTIONNAIRES: GAD7 TOTAL SCORE: 0

## 2019-11-08 RX ORDER — METRONIDAZOLE 7.5 MG/G
1 GEL VAGINAL DAILY
Qty: 25 G | Refills: 0 | Status: SHIPPED | OUTPATIENT
Start: 2019-11-08 | End: 2019-11-13

## 2020-03-03 ENCOUNTER — OFFICE VISIT (OUTPATIENT)
Dept: FAMILY MEDICINE | Facility: CLINIC | Age: 35
End: 2020-03-03
Payer: COMMERCIAL

## 2020-03-03 VITALS
HEART RATE: 76 BPM | OXYGEN SATURATION: 100 % | SYSTOLIC BLOOD PRESSURE: 122 MMHG | DIASTOLIC BLOOD PRESSURE: 83 MMHG | HEIGHT: 62 IN | TEMPERATURE: 98.3 F | BODY MASS INDEX: 32.06 KG/M2 | RESPIRATION RATE: 18 BRPM | WEIGHT: 174.2 LBS

## 2020-03-03 DIAGNOSIS — R07.0 THROAT PAIN: Primary | ICD-10-CM

## 2020-03-03 DIAGNOSIS — Z31.9 DESIRE FOR PREGNANCY: ICD-10-CM

## 2020-03-03 DIAGNOSIS — L03.012 PARONYCHIA OF FINGER OF LEFT HAND: ICD-10-CM

## 2020-03-03 LAB — S PYO AG THROAT QL IA.RAPID: NEGATIVE

## 2020-03-03 RX ORDER — DOXYCYCLINE 100 MG/1
100 CAPSULE ORAL 2 TIMES DAILY
Qty: 14 CAPSULE | Refills: 0 | Status: SHIPPED | OUTPATIENT
Start: 2020-03-03 | End: 2020-03-10

## 2020-03-03 RX ORDER — PRENATAL VIT/IRON FUM/FOLIC AC 27MG-0.8MG
1 TABLET ORAL DAILY
Qty: 90 TABLET | Refills: 3 | Status: SHIPPED | OUTPATIENT
Start: 2020-03-03 | End: 2020-08-25

## 2020-03-03 ASSESSMENT — MIFFLIN-ST. JEOR: SCORE: 1433.42

## 2020-03-03 NOTE — NURSING NOTE
Due to patient being non-English speaking/uses sign language, an  was used for this visit. Only for face-to-face interpretation by an external agency, date and length of interpretation can be found on the scanned worksheet.     name: Tanja Juan   Agency: [facility]  Language: Belinda   Telephone number: [phone]  Type of interpretation: Face-to-face, spoken

## 2020-03-03 NOTE — LETTER
March 3, 2020      Tonia Smith  [address]  [place] [address]        Dear Tonia,    Thank you for getting your care at [facility]. Please see below for your test results.    Resulted Orders   Strep Screen Rapid (Group) ([facility])   Result Value Ref Range    Rapid Strep A Screen NEGATIVE Negative     Your recent throat swab is NORMAL.    If you have any concerns about these results please call and leave a message for me or send a MessageParty message to the clinic.    Sincerely,    Kathy Augustin, DO

## 2020-03-03 NOTE — PROGRESS NOTES
"Tonia is a 35 year old  who presents for   Patient presents with:  Pharyngitis: difficult to swollow, 2 weeks,  Thumb Discomfort: Swollen, 1 week, red, painful     Assessment and Plan      1. Throat pain  Patient presented with multiple complaints. Discussed thumb pain as seen below. Patient did mention she had a sore throat and was concerned about strep pharyngitis, so patient had swab done, rapid negative in clinic. We discussed symptom control, and to present if she has any new symptoms. No recent travel nor exposure to others who have traveled.   - Strep Screen Rapid (Group) ([facility])  - Strep Culture ([facility])    2. Paronychia of finger of left hand  Patient complaining of thumb pain, physical exam consistent with paronychia. Recommended drainage in clinic, however patient became fearful just prior to procedure, and asked for antibiotic treatment instead. Discussed risks and benefits of either treatment, and patient wished to proceed with antibiotic treatment, and if no improvement in a couple of days, will return for procedure.  - doxycycline hyclate (VIBRAMYCIN) 100 MG capsule; Take 1 capsule (100 mg) by mouth 2 times daily for 7 days  Dispense: 14 capsule; Refill: 0    3. Desire for pregnancy  Patient is hoping to become pregnant in the future, prenatal vitamins given today.   - Prenatal Vit-Fe Fumarate-FA (PRENATAL MULTIVITAMIN W/IRON) 27-0.8 MG tablet; Take 1 tablet by mouth daily  Dispense: 90 tablet; Refill: 3     No follow-ups on file.    There are no discontinued medications.      Kathy Augustin, DO [facility]       Tonia is a 35 year old  who presents for   Patient presents with:  Pharyngitis: difficult to swollow, 2 weeks,  Thumb Discomfort: Swollen, 1 week, red, painful     Visit [place]  1. Left thumb swelling  - No known injury  - Nail looks \"broken\"  - Pain started 1 week ago, swelling started yesterday with redness  - No fever  - No history of previous issues in thumb   - She does have right big toe " "darkening and thickening of nail, but no surrounding erythema, no swelling    2. Sore throat  - no sick contacts  - no recent travel  - no contact with others who have traveled  - main symptom is sore throat, denies any respiratory symptoms, no runny nose, no abdominal pain, nor nausea/vomiting    An [employer]  was used for  this visit.   Problem, Medication and Allergy Lists were reviewed and updated if needed..  Patient is an established patient of this clinic.  Reviewed and updated as needed this visit by clinical staff  Tobacco  Allergies  Meds  Med Hx  Surg Hx  Fam Hx  Soc Hx      Reviewed and updated as needed this visit by Provider              Review of Systems:   Review of Systems   Constitutional: Negative for activity change, appetite change, chills, fatigue and fever.   HENT: Positive for sore throat. Negative for rhinorrhea, sinus pressure and sinus pain.    Respiratory: Negative.    Cardiovascular: Negative.    Gastrointestinal: Negative.    Genitourinary: Negative.    Musculoskeletal:        Swelling of left thumb   Skin: Positive for color change.          Physical Exam:     Vitals:    03/03/20 1046 03/03/20 1050   BP: (!) 128/90 122/83   Pulse: 76    Resp: 18    Temp: 98.3  F (36.8  C)    SpO2: 100%    Weight: 79 kg (174 lb 3.2 oz)    Height: 1.575 m (5' 2\")      Body mass index is 31.86 kg/m .  Vitals were reviewed and were normal     Physical Exam  Vitals signs reviewed.   Constitutional:       General: She is not in acute distress.     Appearance: She is not ill-appearing, toxic-appearing or diaphoretic.   HENT:      Nose: No congestion.      Mouth/Throat:      Mouth: Mucous membranes are moist.      Pharynx: Oropharynx is clear. Posterior oropharyngeal erythema present. No oropharyngeal exudate.   Eyes:      Conjunctiva/sclera: Conjunctivae normal.   Cardiovascular:      Rate and Rhythm: Normal rate and regular rhythm.   Pulmonary:      Effort: Pulmonary effort is normal.      " Breath sounds: Normal breath sounds.   Skin:     Capillary Refill: Capillary refill takes less than 2 seconds.      Comments: Swelling and tender to palpation of left thumb at base of nail   Neurological:      Mental Status: She is alert. Mental status is at baseline.   Psychiatric:         Mood and Affect: Mood normal.         Behavior: Behavior normal.         Thought Content: Thought content normal.         Judgment: Judgment normal.           Results:      Results from this visit  Results for orders placed or performed in visit on 03/03/20   Strep Screen Rapid (Group) ([facility])     Status: None   Result Value Ref Range    Rapid Strep A Screen NEGATIVE Negative   Strep Culture ([facility])     Status: None    Specimen: Throat   Result Value Ref Range    Specimen Description Throat     Special Requests Specimen collected in eSwab transport (white cap)     Culture Micro No Beta Streptococcus isolated        Options for treatment and follow-up care were reviewed with the patient. Tonia Smith  engaged in the decision making process and verbalized understanding of the options discussed and agreed with the final plan.  DO [facility]

## 2020-03-05 LAB
BACTERIA SPEC CULT: NORMAL
Lab: NORMAL
SPECIMEN SOURCE: NORMAL

## 2020-03-16 ASSESSMENT — ENCOUNTER SYMPTOMS
SINUS PAIN: 0
COLOR CHANGE: 1
ACTIVITY CHANGE: 0
APPETITE CHANGE: 0
CHILLS: 0
GASTROINTESTINAL NEGATIVE: 1
SORE THROAT: 1
RHINORRHEA: 0
FEVER: 0
RESPIRATORY NEGATIVE: 1
SINUS PRESSURE: 0
CARDIOVASCULAR NEGATIVE: 1
FATIGUE: 0

## 2020-08-17 ENCOUNTER — OFFICE VISIT (OUTPATIENT)
Dept: FAMILY MEDICINE | Facility: CLINIC | Age: 35
End: 2020-08-17
Payer: COMMERCIAL

## 2020-08-17 VITALS
WEIGHT: 174 LBS | HEIGHT: 63 IN | OXYGEN SATURATION: 99 % | BODY MASS INDEX: 30.83 KG/M2 | HEART RATE: 92 BPM | SYSTOLIC BLOOD PRESSURE: 132 MMHG | DIASTOLIC BLOOD PRESSURE: 87 MMHG

## 2020-08-17 DIAGNOSIS — M54.16 LUMBAR RADICULOPATHY: Primary | ICD-10-CM

## 2020-08-17 DIAGNOSIS — R00.2 PALPITATIONS: ICD-10-CM

## 2020-08-17 DIAGNOSIS — R09.81 NASAL CONGESTION: ICD-10-CM

## 2020-08-17 LAB
HCT VFR BLD AUTO: 45.6 % (ref 35–47)
HEMOGLOBIN: 13.8 G/DL (ref 11.7–15.7)
MCH RBC QN AUTO: 28.6 PG (ref 26.5–35)
MCHC RBC AUTO-ENTMCNC: 30.3 G/DL (ref 32–36)
MCV RBC AUTO: 94.5 FL (ref 78–100)
PLATELET # BLD AUTO: 275 K/UL (ref 150–450)
RBC # BLD AUTO: 4.83 M/UL (ref 3.8–5.2)
TSH SERPL DL<=0.005 MIU/L-ACNC: 1.96 MU/L (ref 0.4–4)
WBC # BLD AUTO: 6.4 K/UL (ref 4–11)

## 2020-08-17 RX ORDER — MULTIVITAMIN
1 TABLET ORAL DAILY
Qty: 90 TABLET | Refills: 3 | Status: SHIPPED | OUTPATIENT
Start: 2020-08-17 | End: 2021-07-27

## 2020-08-17 RX ORDER — FLUTICASONE PROPIONATE 50 MCG
1 SPRAY, SUSPENSION (ML) NASAL DAILY
Qty: 9.9 ML | Refills: 1 | Status: SHIPPED | OUTPATIENT
Start: 2020-08-17 | End: 2020-09-16

## 2020-08-17 ASSESSMENT — MIFFLIN-ST. JEOR: SCORE: 1442

## 2020-08-17 NOTE — PROGRESS NOTES
Tonia is a 35 year old  who presents for   Patient presents with:  Physical  Back Pain: Lower back pain x1week       Assessment and Plan      Tonia was seen today for physical and back pain.    Diagnoses and all orders for this visit:    Lumbar radiculopathy  Back pain triggered by recent change in exercise. No red flag symptoms. No radicular symptoms on exam. PT was helpul to patient in the past, will refer back today. Recommend avoiding adding new exercises until evaluated by PT. RTC PRN.   -     [facility], PT, HAND AND CHIROPRACTIC REFERRAL - [facility]; Future    Nasal congestion  Unclear etiology of the odor from her nose. Turbinates are swollen, but NOS to suggest allergic or infectious rhinitis. Will do trial of fluticasone for 2 weeks; if no improvement, RTC for reevaluation.  -     fluticasone (FLONASE) 50 MCG/ACT nasal spray; Spray 1 spray into both nostrils daily    Palpitations  Patient with occasional and self-resolving palpitations. She does have history of elevated blood pressures, but is otherwise free of known cardiovascular disease. TSH and CBC WNL. Most likely this is related to her caffeine consumption. I recommended reducing caffeine intake to 1-2 cups of coffee or tea per day. Return to clinic if palpitations worsen or do not improve with decreased caffeine intake.   -     CBC with Plt ([facility])  -     TSH with free T4 reflex  -     multivitamin (ONE-DAILY) tablet; Take 1 tablet by mouth daily         Return if symptoms worsen or fail to improve.    Options for treatment and follow-up care were reviewed with the patient. Tonia Smith  engaged in the decision making process and verbalized understanding of the options discussed and agreed with the final plan.    Angi Galvan MD         HPI       Tonia is a 35 year old  who presents for   Patient presents with:  Physical  Back Pain: Lower back pain x1week       Visit [place]  Patient initially presenting for CPE, but with multiple complaints that she  and  wanted to address. Visit was transitioned to problem-based visit and I recommended that the patient make a follow up soon for a complete physical exam.    1. Back pain for one week, triggered after starting to use a piece of exercise equipment at home. Has a history of low back pain. Radiates down L leg. Has done PT before and it improved. No bowel or bladder symptoms, no fevers, chills, or weight changes.     2. Nose has smell coming FROM it: Patient and spouse report that she has a strong smell coming from her nose.  reports that sometimes when she is sleeping, he can smell the air coming from her nose and it is somewhat unpleasant. Not foul, but not what he expects. He does not think its her breath. She has also noted this, but has not always been able to tell if its coming from her nose. She has no congestion, sinus drainage, difficulty breathing through the nose.     3. Heart palpitations: Notices at night sometimes. Happens 1-2/week. Does not bother her very much, she just notices it. It lasts a few seconds to a minute and then stops. No chest pain, shortness of breath, dizziness. No recent weight gain, diarrhea or constipation. Does drink a lot of caffeine throughout the day.     4. R thumb: concered about fungus. Had in on L thumb and had thumbnail removed. Worried that that needs to be done on R thumb now.       Patient's  interpreted in [place].     Patient is an established patient of this clinic..  Patient Active Problem List   Diagnosis     Health Care Home     Female circumcision     S/P  section     Headache     History of severe pre-eclampsia     Lumbago     Elevated blood pressure reading without diagnosis of hypertension     Past Surgical History:   Procedure Laterality Date      SECTION  10/5/2013    Procedure:  SECTION;;  Surgeon: Noemy Fowler MD;  Location: [facility] L+[facility]      SECTION N/A 2016    Procedure:  SECTION;   "Surgeon: Leah Patiño MD;  Location: [facility]     GYN SURGERY         Social History     Tobacco Use     Smoking status: Never Smoker     Smokeless tobacco: Never Used   Substance Use Topics     Alcohol use: No     Family History   Problem Relation Age of Onset     Family History Negative Other          Reviewed and updated as needed this visit by clinical staff  Tobacco  Allergies  Meds  Med Hx       Reviewed and updated as needed this visit by Provider              Review of Systems:   Review of Systems  All ROS was negative except those noted in HPI       Physical Exam:     Vitals:    08/17/20 1426   BP: 132/87   BP Location: Left arm   Patient Position: Sitting   Cuff Size: Adult Regular   Pulse: 92   SpO2: 99%   Weight: 78.9 kg (174 lb)   Height: 1.59 m (5' 2.6\")     Body mass index is 31.22 kg/m .  Vitals were reviewed and were normal  Physical Exam   GEN: Alert, oriented in no apparent distress  ENT: Nares with slightly erythematous and edematous turbinates bilaterally. No drainage or FB seen. No odor noted, but provider was masked and wearing shield.   NECK:   No cerivcal lympadenopathy, no thyromegaly  RESPIRATORY: Speaking in full sentences, no extra WOB, CTAB  CVS: RRR nl S1/S2, no murmurs or rubs.   ABDOMEN: soft, non-distended, non-tender. No organomegaly, no peritoneal signs. + bs  MSK: R thumb: discoloration at base of thumbnail extending distally for one third of nail. No lifting or thickening noted. No other fingerails involved.     Tender:  left para lumbar muscles  Range of Motion:  lumbar flexion  full, lumbar extension  full, left lateral lumbar bending  full, right lateral lumbar bending  full, left lateral lumbar rotation  full, right lateral lumbar rotation  full  Special tests:  negative straight leg raises        Results:      Results from this visit  Results for orders placed or performed in visit on 08/17/20   CBC with Plt ([facility])     Status: Abnormal   Result Value Ref " Range    WBC 6.4 4.0 - 11.0 K/uL    RBC 4.83 3.80 - 5.20 M/uL    Hemoglobin 13.8 11.7 - 15.7 g/dL    Hematocrit 45.6 35.0 - 47.0 %    MCV 94.5 78.0 - 100.0 fL    MCH 28.6 26.5 - 35.0 pg    MCHC 30.3 (L) 32.0 - 36.0 g/dL    Platelets 275.0 150.0 - 450.0 K/uL   TSH with free T4 reflex     Status: None   Result Value Ref Range    TSH 1.96 0.40 - 4.00 mU/L       Angi Galvan MD  [facility]

## 2020-08-17 NOTE — LETTER
August 19, 2020      Tonia Smith  [address]  [place] [address]        Dear Tonia,    Thank you for getting your care at [facility]. Your lab results are below. They are normal (Your blood counts look great and your thyroid is working well) and show no sign of the cause of your fast heart beat. I recommend that you reduce your caffeine intake to 1-2 cups of coffee OR tea per day (not 1-2 of each). If your fast heart beat worsens or if it does not get better when you lower the amount of caffeine that you drink, please come back to see us in clinic.     Resulted Orders   CBC with Plt ([facility])   Result Value Ref Range    WBC 6.4 4.0 - 11.0 K/uL    RBC 4.83 3.80 - 5.20 M/uL    Hemoglobin 13.8 11.7 - 15.7 g/dL    Hematocrit 45.6 35.0 - 47.0 %    MCV 94.5 78.0 - 100.0 fL    MCH 28.6 26.5 - 35.0 pg    MCHC 30.3 (L) 32.0 - 36.0 g/dL    Platelets 275.0 150.0 - 450.0 K/uL   TSH with free T4 reflex   Result Value Ref Range    TSH 1.96 0.40 - 4.00 mU/L       If you have any concerns about these results please call and leave a message for me or send a MyChart message to the clinic.    Sincerely,    Angi Galvan MD

## 2020-08-17 NOTE — PATIENT INSTRUCTIONS
Here is the plan from today's visit      Back pain  - [facility], PT, HAND AND CHIROPRACTIC REFERRAL - [facility]; Future    Nasal congestion  - fluticasone (FLONASE) 50 MCG/ACT nasal spray; Spray 1 spray into both nostrils daily  Dispense: 9.9 mL; Refill: 1    4. Palpitations  - CBC with Plt ([facility])  - TSH with free T4 reflex      Thank you for coming to [facility] today.  Lab Testing:  **If you had lab testing today and your results are reassuring or normal they will be mailed to you or sent through [facility] within 7 days.   **If the lab tests need quick action we will call you with the results.  The phone number we will call with results is # [phone] (home) . If this is not the best number please call our clinic and change the number.  Medication Refills:  If you need any refills please call your pharmacy and they will contact us.   If you need to  your refill at a new pharmacy, please contact the new pharmacy directly. The new pharmacy will help you get your medications transferred faster.   Scheduling:  If you have any concerns about today's visit or wish to schedule another appointment please call our office during normal business hours [phone] (8-5:00 M-F)  If a referral was made to a [facility] and you don't get a call from central scheduling please call [phone].  If a Mammogram was ordered for you at [facility] call [phone] to schedule or change your appointment.  If you had an XRay/CT/Ultrasound/MRI ordered the number is [phone] to schedule or change your radiology appointment.   Medical Concerns:  If you have urgent medical concerns please call [phone] at any time of the day.    Angi Galvan MD

## 2020-08-25 ENCOUNTER — OFFICE VISIT (OUTPATIENT)
Dept: FAMILY MEDICINE | Facility: CLINIC | Age: 35
End: 2020-08-25
Payer: COMMERCIAL

## 2020-08-25 ENCOUNTER — TELEPHONE (OUTPATIENT)
Dept: FAMILY MEDICINE | Facility: CLINIC | Age: 35
End: 2020-08-25

## 2020-08-25 VITALS
SYSTOLIC BLOOD PRESSURE: 125 MMHG | HEIGHT: 63 IN | HEART RATE: 82 BPM | BODY MASS INDEX: 30.65 KG/M2 | TEMPERATURE: 97.3 F | RESPIRATION RATE: 16 BRPM | WEIGHT: 173 LBS | DIASTOLIC BLOOD PRESSURE: 88 MMHG | OXYGEN SATURATION: 99 %

## 2020-08-25 DIAGNOSIS — B35.1 ONYCHOMYCOSIS: Primary | ICD-10-CM

## 2020-08-25 DIAGNOSIS — Z31.9 DESIRE FOR PREGNANCY: ICD-10-CM

## 2020-08-25 LAB
ALBUMIN SERPL-MCNC: 4.4 MG/DL (ref 3.8–5)
ALP SERPL-CCNC: 51 U/L (ref 31.7–110.5)
ALT SERPL-CCNC: 13.2 U/L (ref 0–45)
AST SERPL-CCNC: 12.1 U/L (ref 0–45)
BILIRUB SERPL-MCNC: 1.4 MG/DL (ref 0.2–1.3)
BILIRUBIN DIRECT: 0.4 MG/DL (ref 0.1–0.3)
PROT SERPL-MCNC: 6.9 G/DL (ref 6.8–8.8)

## 2020-08-25 RX ORDER — CICLOPIROX 80 MG/ML
SOLUTION TOPICAL
Qty: 6 ML | Refills: 5 | Status: SHIPPED | OUTPATIENT
Start: 2020-08-25 | End: 2021-03-10

## 2020-08-25 RX ORDER — TERBINAFINE HYDROCHLORIDE 250 MG/1
250 TABLET ORAL DAILY
Qty: 90 TABLET | Refills: 0 | Status: SHIPPED | OUTPATIENT
Start: 2020-08-25 | End: 2020-08-25

## 2020-08-25 RX ORDER — PRENATAL VIT/IRON FUM/FOLIC AC 27MG-0.8MG
1 TABLET ORAL DAILY
Qty: 90 TABLET | Refills: 3 | Status: SHIPPED | OUTPATIENT
Start: 2020-08-25 | End: 2021-03-10

## 2020-08-25 ASSESSMENT — MIFFLIN-ST. JEOR: SCORE: 1443.85

## 2020-08-25 NOTE — PATIENT INSTRUCTIONS
Here is the plan from today's visit    1. Onychomycosis  - Hepatic Panel ([facility])  - terbinafine (LAMISIL) 250 MG tablet; Take 1 tablet (250 mg) by mouth daily  Dispense: 90 tablet; Refill: 0  - Hepatic Panel ([facility]); Future    Please call or return to clinic if your symptoms don't go away.    Follow up plan  Please make a clinic appointment for follow up with me (FREDY BAILEY) in 3  months for recheck of toe and finger.    Thank you for coming to [facility] today.  Lab Testing:  **If you had lab testing today and your results are reassuring or normal they will be mailed to you or sent through [facility] within 7 days.   **If the lab tests need quick action we will call you with the results.  The phone number we will call with results is # [phone] (home) . If this is not the best number please call our clinic and change the number.  Medication Refills:  If you need any refills please call your pharmacy and they will contact us.   If you need to  your refill at a new pharmacy, please contact the new pharmacy directly. The new pharmacy will help you get your medications transferred faster.   Scheduling:  If you have any concerns about today's visit or wish to schedule another appointment please call our office during normal business hours [phone] (8-5:00 M-F)  If a referral was made to a [facility] and you don't get a call from central scheduling please call [phone].  If a Mammogram was ordered for you at [facility] call [phone] to schedule or change your appointment.  If you had an XRay/CT/Ultrasound/MRI ordered the number is [phone] to schedule or change your radiology appointment.   Medical Concerns:  If you have urgent medical concerns please call [phone] at any time of the day.    Fredy Bailey MD

## 2020-08-25 NOTE — PROGRESS NOTES
"  Assessment and Plan      Tonia was seen today for fungal infection and headache.    Diagnoses and all orders for this visit:    Onychomycosis  Exam consistent with onychomycosis, but covering proximal 50% of fingernail she would qualify for oral terbinafine treatment, so hepatic panel was drawn. However, on further review she is trying to get pregnant and terbinafine is not known to be safe in pregnancy, so will continue with topical treatment for now. Pt is aware of this change and she did not start the oral terbinafine.  -     Hepatic Panel ([facility])  -     ciclopirox (PENLAC) 8 % external solution; Apply to adjacent skin and affected nails daily.  Remove with alcohol every 7 days, then repeat.    Desire for pregnancy  -     Prenatal Vit-Fe Fumarate-FA (PRENATAL MULTIVITAMIN W/IRON) 27-0.8 MG tablet; Take 1 tablet by mouth daily           HPI     Tonia Smith is a 35 year old year old female with a history of  has a past medical history of Hypertension and Preeclampsia. who presents for Fungal Infection and Headache    Has had fingernail and toenail changes for the last 2 weeks-1 month. Occurring on the first finger of her right hand and first and fourth toes of left foot. No other rash or skin changes. Has never had this before. She wants something to help get rid of this.    An [facility]  was used for  this visit.      +++++++    Problem, Medication and Allergy Lists were reviewed and updated if needed.    Patient is an established patient of this clinic.         Review of Systems:   Review of Systems  A 6 point review of systems was performed and was negative except as noted above.         Physical Exam:   /88   Pulse 82   Temp 97.3  F (36.3  C) (Tympanic)   Resp 16   Ht 1.6 m (5' 3\")   Wt 78.5 kg (173 lb)   LMP 07/17/2020 (Exact Date)   SpO2 99%   Breastfeeding No   BMI 30.65 kg/m    Body mass index is 30.65 kg/m .    Vitals were reviewed and were normal     Physical " Exam  Constitutional:       General: She is not in acute distress.     Appearance: She is well-developed.   HENT:      Head: Normocephalic and atraumatic.   Eyes:      General: No scleral icterus.     Conjunctiva/sclera: Conjunctivae normal.   Cardiovascular:      Rate and Rhythm: Normal rate.   Pulmonary:      Effort: Pulmonary effort is normal. No respiratory distress.   Musculoskeletal:      Comments: Nail of right pointer finger with pale discoloration on the proximal half of the fingernail  1st and 4th toenails of left foot hyperkeratotic and raised with pale-yellow discoloration   Skin:     General: Skin is warm and dry.      Findings: No erythema.   Neurological:      Mental Status: She is alert and oriented to person, place, and time.           Results:   Results are ordered and pending     There are no discontinued medications.    Options for treatment and follow-up care were reviewed with the patient. Tonia Smith engaged in the decision making process and verbalized understanding of the options discussed and agreed with the final plan.    Yuniel Bailey MD

## 2020-08-25 NOTE — PROGRESS NOTES
Preceptor Attestation:   Patient seen, evaluated and discussed with the resident. I have verified the content of the note, which accurately reflects my assessment of the patient and the plan of care.   Supervising Physician:  Audrey Bartlett MD

## 2020-08-25 NOTE — TELEPHONE ENCOUNTER
Per Dr. Bailey RN called pt via language line to ask if pt is planning on getting pregnancy in the next 3 months. If so the lamisal should be switched to cream form    Pt stated she is trying to getting pregnant in the next 3 month. Pt has not picked up the pills yet. RN said we will send cream instead. Pt requested [facility] pharmacy again     Jessica Yousif RN

## 2020-08-27 ENCOUNTER — THERAPY VISIT (OUTPATIENT)
Dept: PHYSICAL THERAPY | Facility: CLINIC | Age: 35
End: 2020-08-27
Attending: FAMILY MEDICINE
Payer: COMMERCIAL

## 2020-08-27 DIAGNOSIS — M54.16 LUMBAR RADICULOPATHY: ICD-10-CM

## 2020-08-27 PROBLEM — M54.50 LUMBAGO: Status: RESOLVED | Noted: 2017-04-20 | Resolved: 2020-08-27

## 2020-08-27 PROCEDURE — 97530 THERAPEUTIC ACTIVITIES: CPT | Mod: GP | Performed by: PHYSICAL THERAPIST

## 2020-08-27 PROCEDURE — 97161 PT EVAL LOW COMPLEX 20 MIN: CPT | Mod: GP | Performed by: PHYSICAL THERAPIST

## 2020-08-27 PROCEDURE — 97110 THERAPEUTIC EXERCISES: CPT | Mod: GP | Performed by: PHYSICAL THERAPIST

## 2020-08-27 NOTE — PROGRESS NOTES
[facility] Initial Evaluation  Subjective:  The history is provided by the patient and a caregiver. The history is limited by a language barrier. A  was used.                       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  Pain quality (sharp/shooting/stabbing/aching/burning/cramping):  At night it is a burning sensation   Paresthesia (yes/no):  no    Present since (onset date): 7/27/2020.     Symptoms (improving/unchanging/worsening):  improving.     Symptoms commenced as a result of: unknown   Condition occurred in the following environment:   unknown     Symptoms at onset (back/thigh/leg): back and leg  Constant symptoms (back/thigh/leg): back  Intermittent symptoms (back/thigh/leg): leg    Symptoms are made worse with the following: bending, sitting, walking  Symptoms are made better with the following: nothing    Disturbed sleep (yes/no):  yes     Previous episodes (0/1-5/6-10/11+): 2     Previous history: Similar symptoms responsive to PT    Medications (nil/NSAIDS/analg/steroids/anticoag/other): see Epic  General health (excellent/good/fair/poor):  fair  Imaging (None/Xray/MRI/Other):  None current  Recent or major surgery (yes/no):  no  Night pain (yes/no): no  Accidents (yes/no): no  Unexplained weight loss (yes/no): no  Barriers at home: no  Other red flags: no    EXAMINATION    Posture:   Sitting (good/fair/poor): fair  Standing (good/fair/poor):fair  Lordosis (red/acc/normal): acc  Correction of posture (better/worse/no effect): no    Lateral Shift (right/left/nil): no  Relevant (yes/no):  na  Other Observations:  na    Neurological:      Movement Loss:   Jesús Mod Min Nil Pain   Flexion x    +++   Extension    -    Side Gliding R   x  + leg   Side Gliding L   x  +     Test Movements:   During: produces, abolishes, increases, decreases, no effect, centralizing, peripheralizing   After: better, worse, no better, no worse, no effect, centralized, peripheralized      Pretest symptoms lying: LBP, left leg pain    Symptoms During Symptoms After ROM increased ROM decreased No Effect   PETER        Rep PETER Inc back NE Inc flexion     EIL        Rep EIL            Static Tests:    Other Tests: na    Provisional Classification:  Derangement - Bilateral, symmetrical, symptoms above knee    Principle of Management:  Education: (Therapeutic Exercise): Pt education regarding four stages of healing: pain reduction, maintenance through exercise, recovery of function, and prevention of re-occurrence. Utilized prescription dosage of exercise theory, cut finger analogy, and scientific method to help patient understand purpose of exercise specificity and importance of compliance with exercise.  Pt also educated in traffic light response to exercise, and self assessment to guide home exercise program.    Equipment provided:  Postural correction with instruct in use of lumbar roll and sitting posture when unsupported, w education provided re: impact of posture and body mechanics on symptom production. Pt encouraged to monitor this correlation as part of overall self management.  Mechanical therapy (Y/N):  y   Extension principle:  n  Lateral Principle:  n  Flexion principle:  y  Other:  n    ASSESSMENT/PLAN:    Patient is a 35 year old female with LBP complaints.    Patient has the following significant findings with corresponding treatment plan.                Diagnosis 1:  LBP  Pain -  manual therapy, self management, education, directional preference exercise and home program  Decreased ROM/flexibility - manual therapy and therapeutic  exercise  Decreased joint mobility - manual therapy and therapeutic exercise  Decreased strength - therapeutic exercise and therapeutic activities  Decreased proprioception - neuro re-education and therapeutic activities  Impaired gait - gait training  Impaired muscle performance - neuro re-education  Decreased function - therapeutic activities  Impaired posture - neuro re-education    Previous and current functional limitations:  (See Goal Flow Sheet for this information)    Short term and Long term goals: (See Goal Flow Sheet for this information)     Communication ability:  Patient appears to be able to clearly communicate and understand verbal and written communication and follow directions correctly.  Treatment Explanation - The following has been discussed with the patient:   RX ordered/plan of care  Anticipated outcomes  Possible risks and side effects  This patient would benefit from PT intervention to resume normal activities.   Rehab potential is good.    Frequency:  1X week, once daily  Duration:  for 6 weeks  Discharge Plan:  Achieve all LTG.  Independent in home treatment program.  Reach maximal therapeutic benefit.    Please refer to the daily flowsheet for treatment today, total treatment time and time spent performing 1:1 timed codes.

## 2020-09-03 ENCOUNTER — THERAPY VISIT (OUTPATIENT)
Dept: PHYSICAL THERAPY | Facility: CLINIC | Age: 35
End: 2020-09-03
Payer: COMMERCIAL

## 2020-09-03 DIAGNOSIS — M54.16 LUMBAR RADICULOPATHY: Primary | ICD-10-CM

## 2020-09-03 PROCEDURE — 97110 THERAPEUTIC EXERCISES: CPT | Mod: GP | Performed by: PHYSICAL THERAPIST

## 2020-09-03 PROCEDURE — 97140 MANUAL THERAPY 1/> REGIONS: CPT | Mod: GP | Performed by: PHYSICAL THERAPIST

## 2020-09-14 ENCOUNTER — THERAPY VISIT (OUTPATIENT)
Dept: PHYSICAL THERAPY | Facility: CLINIC | Age: 35
End: 2020-09-14
Payer: COMMERCIAL

## 2020-09-14 DIAGNOSIS — M54.16 LUMBAR RADICULOPATHY: Primary | ICD-10-CM

## 2020-09-14 PROCEDURE — 97140 MANUAL THERAPY 1/> REGIONS: CPT | Mod: GP | Performed by: PHYSICAL THERAPIST

## 2020-09-14 PROCEDURE — 97110 THERAPEUTIC EXERCISES: CPT | Mod: GP | Performed by: PHYSICAL THERAPIST

## 2020-12-15 NOTE — PROGRESS NOTES
Discharge Note    Progress reporting period is from initial evaluation date (please see noted date below) to Sep 14, 2020.  Linked Episodes   Type: Episode: Status: Noted: Resolved: Last update: Updated by:   PHYSICAL THERAPY LBP Active 8/27/2020 9/14/2020 10:48 AM Meliza Benton, PT      Comments:       Tonia failed to follow up and current status is unknown.  Please see information below for last relevant information on current status.  Patient seen for 3 visits.    SUBJECTIVE  Subjective changes noted by patient:  No change; cont c/o leg pain adiel at night  .  Current pain level is 4/10.     Previous pain level was   .   Changes in function:  Yes (See Goal flowsheet attached for changes in current functional level)  Adverse reaction to treatment or activity: None    OBJECTIVE  Changes noted in objective findings: Baseline: Central LBP only; FIS mod limit w inc LBP.  REIL imp [facility] baseline, no leg pain.     ASSESSMENT/PLAN  Diagnosis: LBP   Updated problem list and treatment plan:   Pain - HEP  STG/LTGs have been met or progress has been made towards goals:  Yes, please see goal flowsheet for most current information  Assessment of Progress: current status is unknown.    Last current status: Pt is progressing as expected   Self Management Plans:  HEP  I have re-evaluated this patient and find that the nature, scope, duration and intensity of the therapy is appropriate for the medical condition of the patient.  Tonia continues to require the following intervention to meet STG and LTG's:  HEP.    Recommendations:  Discharge with current home program.  Patient to follow up with MD as needed.    Please refer to the daily flowsheet for treatment today, total treatment time and time spent performing 1:1 timed codes.

## 2021-01-06 NOTE — DISCHARGE INSTRUCTIONS
Dr. Tim rosenberg, Discussed patient's condition, needs, plan of care. Please keep your appointment with [place] tomorrow at 2 PM.

## 2021-03-10 ENCOUNTER — OFFICE VISIT (OUTPATIENT)
Dept: FAMILY MEDICINE | Facility: CLINIC | Age: 36
End: 2021-03-10
Payer: COMMERCIAL

## 2021-03-10 VITALS
BODY MASS INDEX: 30.55 KG/M2 | HEART RATE: 83 BPM | OXYGEN SATURATION: 100 % | RESPIRATION RATE: 14 BRPM | SYSTOLIC BLOOD PRESSURE: 124 MMHG | DIASTOLIC BLOOD PRESSURE: 90 MMHG | TEMPERATURE: 97.5 F | WEIGHT: 172.4 LBS | HEIGHT: 63 IN

## 2021-03-10 DIAGNOSIS — B35.1 ONYCHOMYCOSIS: ICD-10-CM

## 2021-03-10 DIAGNOSIS — Z31.9 DESIRE FOR PREGNANCY: ICD-10-CM

## 2021-03-10 DIAGNOSIS — Z00.00 ROUTINE GENERAL MEDICAL EXAMINATION AT A HEALTH CARE FACILITY: Primary | ICD-10-CM

## 2021-03-10 DIAGNOSIS — Z23 NEED FOR PROPHYLACTIC VACCINATION AND INOCULATION AGAINST INFLUENZA: ICD-10-CM

## 2021-03-10 DIAGNOSIS — Z00.00 ROUTINE HISTORY AND PHYSICAL EXAMINATION OF ADULT: ICD-10-CM

## 2021-03-10 LAB
BUN SERPL-MCNC: 11 MG/DL (ref 7–19)
CALCIUM SERPL-MCNC: 8.9 MG/DL (ref 8.5–10.1)
CHLORIDE SERPLBLD-SCNC: 100.7 MMOL/L (ref 98–110)
CHOLEST SERPL-MCNC: 157.8 MG/DL (ref 0–200)
CHOLEST/HDLC SERPL: 3.9 {RATIO} (ref 0–5)
CO2 SERPL-SCNC: 27.9 MMOL/L (ref 20–32)
CREAT SERPL-MCNC: 0.5 MG/DL (ref 0.5–1)
GFR SERPL CREATININE-BSD FRML MDRD: >90 ML/MIN/1.7 M2
GLUCOSE SERPL-MCNC: 106.3 MG'DL (ref 70–99)
HCT VFR BLD AUTO: 41.9 % (ref 35–47)
HDLC SERPL-MCNC: 40.9 MG/DL
HEMOGLOBIN: 13.5 G/DL (ref 11.7–15.7)
LDLC SERPL CALC-MCNC: 90 MG/DL (ref 0–129)
MCH RBC QN AUTO: 29.6 PG (ref 26.5–35)
MCHC RBC AUTO-ENTMCNC: 32.2 G/DL (ref 32–36)
MCV RBC AUTO: 91.8 FL (ref 78–100)
PLATELET # BLD AUTO: 229 10E9/L (ref 150–450)
POTASSIUM SERPL-SCNC: 4 MMOL/L (ref 3.3–4.5)
RBC # BLD AUTO: 4.56 10E12/L (ref 3.8–5.2)
SODIUM SERPL-SCNC: 134.4 MMOL/L (ref 132.6–141.4)
TRIGL SERPL-MCNC: 133.6 MG/DL (ref 0–150)
VLDL CHOLESTEROL: 26.7 MG/DL (ref 7–32)
WBC # BLD AUTO: 4.7 10E9/L (ref 4–11)

## 2021-03-10 PROCEDURE — 99395 PREV VISIT EST AGE 18-39: CPT | Mod: 25 | Performed by: STUDENT IN AN ORGANIZED HEALTH CARE EDUCATION/TRAINING PROGRAM

## 2021-03-10 PROCEDURE — 86803 HEPATITIS C AB TEST: CPT | Performed by: FAMILY MEDICINE

## 2021-03-10 PROCEDURE — 90686 IIV4 VACC NO PRSV 0.5 ML IM: CPT | Performed by: STUDENT IN AN ORGANIZED HEALTH CARE EDUCATION/TRAINING PROGRAM

## 2021-03-10 PROCEDURE — 85027 COMPLETE CBC AUTOMATED: CPT | Performed by: STUDENT IN AN ORGANIZED HEALTH CARE EDUCATION/TRAINING PROGRAM

## 2021-03-10 PROCEDURE — 36415 COLL VENOUS BLD VENIPUNCTURE: CPT | Performed by: FAMILY MEDICINE

## 2021-03-10 PROCEDURE — 90471 IMMUNIZATION ADMIN: CPT | Performed by: STUDENT IN AN ORGANIZED HEALTH CARE EDUCATION/TRAINING PROGRAM

## 2021-03-10 PROCEDURE — 80048 BASIC METABOLIC PNL TOTAL CA: CPT | Performed by: STUDENT IN AN ORGANIZED HEALTH CARE EDUCATION/TRAINING PROGRAM

## 2021-03-10 PROCEDURE — 80061 LIPID PANEL: CPT | Performed by: STUDENT IN AN ORGANIZED HEALTH CARE EDUCATION/TRAINING PROGRAM

## 2021-03-10 RX ORDER — CICLOPIROX 80 MG/ML
SOLUTION TOPICAL
Qty: 6 ML | Refills: 5 | Status: SHIPPED | OUTPATIENT
Start: 2021-03-10 | End: 2021-07-27

## 2021-03-10 RX ORDER — MULTIVITAMIN
1 TABLET ORAL DAILY
Qty: 90 TABLET | Refills: 3 | Status: CANCELLED | OUTPATIENT
Start: 2021-03-10

## 2021-03-10 RX ORDER — PRENATAL VIT/IRON FUM/FOLIC AC 27MG-0.8MG
1 TABLET ORAL DAILY
Qty: 90 TABLET | Refills: 3 | Status: SHIPPED | OUTPATIENT
Start: 2021-03-10 | End: 2021-07-27

## 2021-03-10 ASSESSMENT — MIFFLIN-ST. JEOR: SCORE: 1430.38

## 2021-03-10 ASSESSMENT — PATIENT HEALTH QUESTIONNAIRE - PHQ9: SUM OF ALL RESPONSES TO PHQ QUESTIONS 1-9: 1

## 2021-03-10 NOTE — PATIENT INSTRUCTIONS
Patient Education       Hello,     I know you are trying to get pregnant and we are getting you as healthy as we can to make that happen. The only medications you have been on for pregnancy in the past are the prenatal vitamin you are on now. Please take this daily.     If you are still having difficulty getting pregnant this fall we can approach other options to increase pregnancy, but until that time we are within the normal time range for trying to achieve pregnancy.     I will reach out to you with the results of your labs if there is anything we need to optimize.     You should be seen in October for a PAP smear. Please reach out sooner if you have any other questions or concerns.      Prevention Guidelines, Women Ages 18 to 39  Screening tests and vaccines are an important part of managing your health. A screening test is done to find possible disorders or diseases in people who don't have any symptoms. The goal is to find a disease early so lifestyle changes can be made and you can be watched more closely to reduce the risk of disease, or to detect it early enough to treat it most effectively. Screening tests are not considered diagnostic, but are used to determine if more testing is needed. Health counseling is essential, too. Below are guidelines for these, for women ages 18 to 39. Talk with your healthcare provider to make sure you re up-to-date on what you need.   Screening Who needs it How often   Alcohol misuse All women in this age group  At routine exams   Blood pressure All women in this age group  Yearly checkup if your blood pressure is normal   Normal blood pressure is less than 120/80 mm Hg   If your blood pressure reading is higher than normal, follow the advice of your healthcare provider    Breast cancer All women in this age group should talk with their healthcare providers about the need for clinical breast exams (CBE)1  Clinical breast exam every 3 years1    Cervical cancer Women ages 21  and older  Women between ages 21 and 29 should have a Pap test every 3 years; women between ages 30 and 65 are advised to have a Pap test plus an HPV test every 5 years    Chlamydia Sexually active women ages 24 and younger, and women at increased risk for infection  Every 3 years if you're at risk or have symptoms    Depression All women in this age group  At routine exams   Diabetes mellitus, type 2  Adults with no symptoms who are overweight or obese and have 1 or more other risk factors for diabetes  At least every 3 years. Also, testing for diabetes during pregnancy after the 24th week.     Gonorrhea Sexually active women at increased risk for infection  At routine exams   Hepatitis C Anyone at increased risk  At routine exams   HIV All women At routine exams3     Obesity All women in this age group  At routine exams   Syphilis Women at increased risk for infection should talk with their healthcare provider  At routine exams   Tuberculosis Women at increased risk for infection should talk with their healthcare provider  Ask your healthcare provider    Vision All women in this age group  At least 1 complete exam in your 20s, and 2 in your 30s    Vaccine Who needs it How often   Chickenpox (varicella)  All women in this age group who have no record of this infection or vaccine  2 doses; the second dose should be given 4 to 8 weeks after the first dose    Hepatitis A Women at increased risk for infection should talk with their healthcare provider  2 doses given at least 6 months apart    Hepatitis B Women at increased risk for infection should talk with their healthcare provider  3 doses over 6 months; second dose should be given 1 month after the first dose; the third dose should be given at least 2 months after the second dose and at least 4 months after the first dose    Haemophilus influenzae  Type B (HIB)  Women at increased risk for infection should talk with their healthcare provider  1 to 3 doses   Human  papillomavirus (HPV)  All women in this age group up to age 26  3 doses; the second dose should be given 1 to 2 months after the first dose and the third dose given 6 months after the first dose    Influenza (flu) All women in this age group  Once a year   Measles, mumps, rubella (MMR)  All women in this age group who have no record of these infections or vaccines  1 or 2 doses   Meningococcal Women at increased risk for infection should talk with their healthcare provider  1 or more doses   Pneumococcal conjugate vaccine (PCV13) and pneumococcal polysaccharide vaccine (PPSV23)  Women at increased risk for infection should talk with their healthcare provider  PCV13: 1 dose ages 19 to 65 (protects against 13 types of pneumococcal bacteria)   PPSV23: 1 to 2 doses through age 64, or 1 dose at 65 or older (protects against 23 types of pneumococcal bacteria)    Tetanus/diphtheria/pertussis (Td/Tdap) booster All women in this age group  Td every 10 years, or a one-time dose of Tdap instead of a Td booster after age 18, then Td every 10 years    Counseling Who needs it How often   BRCA gene mutation testing for breast and ovarian cancer susceptibility  Women with increased risk for having gene mutation  When your risk is known    Breast cancer and chemoprevention  Women at high risk for breast cancer  When your risk is known    Diet and exercise Women who are overweight or obese  When diagnosed, and then at routine exams    Domestic violence Women at the age in which they are able to have children  At routine exams   Sexually transmitted infection prevention  Women who are sexually active  At routine exams   Skin cancer Prevention of skin cancer in fair-skinned adults  At routine exams   Use of tobacco and the health effects it can cause  All women in this age group  Every visit   1 According to the ACS, women ages 20 to 39 years should have a clinical breast exam (CBE) as part of their routine health exam every 3 years.  Breast self-exams are an option for women starting in their 20s. But the U.S. Preventive Services Task Force (USPSTF) does not recommend CBE.   2 Those who are 18 years old and not up-to-date on their childhood vaccines should get all appropriate catch-up vaccines recommended by the CDC.   3 The USPSTF recommends that all people ages 15 to 65 years be screened for HIV and those younger or older people at increased risk. The CDC recommends that everyone between the ages of 13 and 64 get tested for HIV at least once as part of routine health care.   [employer] last reviewed this educational content on 10/1/2017    9021-9383 [employer]. All rights reserved. This information is not intended as a substitute for professional medical care. Always follow your healthcare professional's instructions.           Preventive Health Recommendations  Female Ages 26 - 39  Yearly exam:   See your health care provider every year in order to    Review health changes.     Discuss preventive care.      Review your medicines if you your doctor has prescribed any.    Until age 30: Get a Pap test every three years (more often if you have had an abnormal result).    After age 30: Talk to your doctor about whether you should have a Pap test every 3 years or have a Pap test with HPV screening every 5 years.   You do not need a Pap test if your uterus was removed (hysterectomy) and you have not had cancer.  You should be tested each year for STDs (sexually transmitted diseases), if you're at risk.   Talk to your provider about how often to have your cholesterol checked.  If you are at risk for diabetes, you should have a diabetes test (fasting glucose).  Shots: Get a flu shot each year. Get a tetanus shot every 10 years.   Nutrition:     Eat at least 5 servings of fruits and vegetables each day.    Eat whole-grain bread, whole-wheat pasta and brown rice instead of white grains and rice.    Get adequate Calcium and Vitamin D.      Lifestyle    Exercise at least 150 minutes a week (30 minutes a day, 5 days of the week). This will help you control your weight and prevent disease.    Limit alcohol to one drink per day.    No smoking.     Wear sunscreen to prevent skin cancer.    See your dentist every six months for an exam and cleaning.

## 2021-03-10 NOTE — PROGRESS NOTES
Female Physical Note          HPI         Concerns today: Patient very much desires to become pregnant.  She stopped all chemical birth control about 2 years ago, she and her partner stopped using other preventative measures and started actively trying to have a baby this fall.  She has not yet become pregnant, her periods are irregular but they have been since she discontinued her birth control.  She was previously prescribed prenatal vitamins however had not yet picked them up, is requesting that those be reset.  She continues to have fungal infection in her toenails that she is using a topical for as orally as well as are not safe in pregnancy.  An [employer]  was used for  this visit.     Patient Active Problem List   Diagnosis     Health Care Home     Female circumcision     S/P  section     Headache     History of severe pre-eclampsia     Elevated blood pressure reading without diagnosis of hypertension       Past Medical History:   Diagnosis Date     Hypertension      Preeclampsia        Previous Medical Care   [facility]     Family History   Problem Relation Age of Onset     Family History Negative Other               Review of Systems:     Review of Systems:  CONSTITUTIONAL: NEGATIVE for fever, chills, change in weight  EYES: NEGATIVE for vision changes or irritation  ENT/MOUTH: NEGATIVE for ear, mouth and throat problems  RESP: NEGATIVE for significant cough or SOB  CV: NEGATIVE for chest pain, palpitations or peripheral edema  GI: NEGATIVE for nausea, abdominal pain, heartburn, or change in bowel habits  : NEGATIVE for frequency, dysuria, or hematuria  MUSCULOSKELETAL: NEGATIVE for significant arthralgias or myalgia  NEURO: NEGATIVE for weakness, dizziness or paresthesias  ENDOCRINE: NEGATIVE for temperature intolerance, skin/hair changes  HEME/ALLERGY: NEGATIVE for bleeding problems  PSYCHIATRIC: NEGATIVE for changes in mood or affect           Social History     Social History      Socioeconomic History     Marital status:      Spouse name: Not on file     Number of children: 0     Years of education: 9th grade     Highest education level: Not on file   Occupational History     Occupation: labor     Employer: [employer]   Social Needs     Financial resource strain: Not on file     Food insecurity     Worry: Not on file     Inability: Not on file     Transportation needs     Medical: Not on file     Non-medical: Not on file   Tobacco Use     Smoking status: Never Smoker     Smokeless tobacco: Never Used   Substance and Sexual Activity     Alcohol use: No     Drug use: No     Sexual activity: Yes     Partners: Male     Birth control/protection: None   Lifestyle     Physical activity     Days per week: Not on file     Minutes per session: Not on file     Stress: Not on file   Relationships     Social connections     Talks on phone: Not on file     Gets together: Not on file     Attends [employer]ism service: Not on file     Active member of club or organization: Not on file     Attends meetings of clubs or organizations: Not on file     Relationship status: Not on file     Intimate partner violence     Fear of current or ex partner: Not on file     Emotionally abused: Not on file     Physically abused: Not on file     Forced sexual activity: Not on file   Other Topics Concern      Service No     Blood Transfusions No     Caffeine Concern No     Occupational Exposure No     Hobby Hazards No     Sleep Concern No     Stress Concern No     Weight Concern No     Special Diet No     Back Care No     Exercise No     Bike Helmet No     Seat Belt No     Self-Exams No     Parent/sibling w/ CABG, MI or angioplasty before 65F 55M? Not Asked   Social History Narrative     Not on file       Marital Status:  Who lives in your household?  Herself, , 2 children 7 and 4 years of age    Patient feels safe and well supported at home, is not afraid of any partner at  "home    Sexual Health     Sexual concerns: No   STI History: Neg  Pregnancy History:   LMP Patient's last menstrual period was 2021. LMP 3/8/2021  Last Pap Smear Date:   Lab Results   Component Value Date    PAP NIL 10/11/2016    PAP NIL 2013     Due for Pap smear in 2021, patient is aware and will return at that time  Abnormal Pap History: None    Recommended Screening     Pap/HPV cotest every 5 years for women 30-65   Testing not indicated          Physical Exam:     Vitals: BP (!) 124/90 (BP Location: Left arm, Patient Position: Sitting, Cuff Size: Adult Regular)   Pulse 83   Temp 97.5  F (36.4  C) (Oral)   Resp 14   Ht 1.591 m (5' 2.64\")   Wt 78.2 kg (172 lb 6.4 oz)   LMP 2021   SpO2 100%   Breastfeeding No   BMI 30.89 kg/m    BMI= Body mass index is 30.89 kg/m .   GENERAL: healthy, alert and no distress  EYES: Eyes grossly normal to inspection, extraocular movements - intact, and PERRL  HENT: ear canals- normal; TMs- normal; Nose- normal; Mouth- no ulcers, no lesions  NECK: no tenderness, no adenopathy, no asymmetry, no masses, no stiffness; thyroid- normal to palpation  RESP: lungs clear to auscultation - no rales, no rhonchi, no wheezes  CV: regular rates and rhythm, normal S1 S2, no S3 or S4 and no murmur, no click or rub -  ABDOMEN: soft, no tenderness, no  hepatosplenomegaly, no masses, normal bowel sounds  MS: extremities- no gross deformities noted, no edema  NEURO: strength and tone- normal, sensory exam- grossly normal, mentation- intact, speech-clearly understood by , reflexes- symmetric  BACK: no CVA tenderness, no paralumbar tenderness  PSYCH: Alert and oriented times 3; well-groomed, interacts appropriately with provider      Assessment and Plan      Tonia was seen today for physical and imm/inj.    Diagnoses and all orders for this visit:    Need for prophylactic vaccination and inoculation against influenza  -     INFLUENZA VACCINE IM > 6 " MONTHS VALENT IIV4 [59277]    Onychomycosis  -     ciclopirox (PENLAC) 8 % external solution; Apply to adjacent skin and affected nails daily.  Remove with alcohol every 7 days, then repeat.    Palpitations    Desire for pregnancy  -     Prenatal Vit-Fe Fumarate-FA (PRENATAL MULTIVITAMIN W/IRON) 27-0.8 MG tablet; Take 1 tablet by mouth daily    Routine history and physical examination of adult  -     CBC with Plt ([facility])  -     Basic Metabolic Panel ([facility])  -     Lipid Cascade ([facility])  -     Hepatitis C antibody    Routine general medical examination at a health care facility        1. Health Care Maintenance: Normal Physical Exam  2.  Desires pregnancy, is taking prenatal vitamin and avoiding teratogenic medications.  We discussed that it can take over a year of continuous delivery trying to conceive, if she is continuing to have difficulty conceiving we can discuss fertility options in the follow-up.      Options for treatment and follow-up care were reviewed with the patient . Tonia Luis and/or guardian engaged in the decision making process and verbalized understanding of the options discussed and agreed with the final plan.    Olga Chow MD

## 2021-03-10 NOTE — NURSING NOTE
Due to patient being non-English speaking/uses sign language, an  was used for this visit. Only for face-to-face interpretation by an external agency, date and length of interpretation can be found on the scanned worksheet.     name: Pushpa  Agency: [employer]  Language: Belinda   Telephone number: [phone]  Type of interpretation: Telephone, spoken

## 2021-03-11 LAB — HCV AB SERPL QL IA: NONREACTIVE

## 2021-03-15 NOTE — PROGRESS NOTES
Preceptor Attestation:   Patient seen, evaluated and discussed with the resident. I have verified the content of the note, which accurately reflects my assessment of the patient and the plan of care.   Supervising Physician:  Barb Quigley, DO

## 2021-04-16 ENCOUNTER — IMMUNIZATION (OUTPATIENT)
Dept: NURSING | Facility: CLINIC | Age: 36
End: 2021-04-16
Payer: COMMERCIAL

## 2021-04-16 PROCEDURE — 0001A PR COVID VAC PFIZER DIL RECON 30 MCG/0.3 ML IM: CPT

## 2021-04-16 PROCEDURE — 91300 PR COVID VAC PFIZER DIL RECON 30 MCG/0.3 ML IM: CPT

## 2021-05-07 ENCOUNTER — IMMUNIZATION (OUTPATIENT)
Dept: NURSING | Facility: CLINIC | Age: 36
End: 2021-05-07
Attending: INTERNAL MEDICINE
Payer: COMMERCIAL

## 2021-05-07 PROCEDURE — 0002A PR COVID VAC PFIZER DIL RECON 30 MCG/0.3 ML IM: CPT

## 2021-05-07 PROCEDURE — 91300 PR COVID VAC PFIZER DIL RECON 30 MCG/0.3 ML IM: CPT

## 2021-07-27 ENCOUNTER — OFFICE VISIT (OUTPATIENT)
Dept: FAMILY MEDICINE | Facility: CLINIC | Age: 36
End: 2021-07-27
Payer: COMMERCIAL

## 2021-07-27 VITALS
RESPIRATION RATE: 16 BRPM | BODY MASS INDEX: 31.11 KG/M2 | HEART RATE: 84 BPM | TEMPERATURE: 98 F | DIASTOLIC BLOOD PRESSURE: 84 MMHG | OXYGEN SATURATION: 98 % | SYSTOLIC BLOOD PRESSURE: 117 MMHG | WEIGHT: 173.6 LBS

## 2021-07-27 DIAGNOSIS — M54.16 LUMBAR RADICULOPATHY: Primary | ICD-10-CM

## 2021-07-27 DIAGNOSIS — Z31.9 DESIRE FOR PREGNANCY: ICD-10-CM

## 2021-07-27 DIAGNOSIS — Z00.00 HEALTH CARE MAINTENANCE: ICD-10-CM

## 2021-07-27 DIAGNOSIS — B35.1 ONYCHOMYCOSIS: ICD-10-CM

## 2021-07-27 DIAGNOSIS — M54.50 BILATERAL LOW BACK PAIN WITHOUT SCIATICA, UNSPECIFIED CHRONICITY: ICD-10-CM

## 2021-07-27 PROCEDURE — 99214 OFFICE O/P EST MOD 30 MIN: CPT | Performed by: FAMILY MEDICINE

## 2021-07-27 RX ORDER — MULTIVITAMIN
1 TABLET ORAL DAILY
Qty: 90 TABLET | Refills: 3 | Status: SHIPPED | OUTPATIENT
Start: 2021-07-27 | End: 2021-07-27 | Stop reason: ALTCHOICE

## 2021-07-27 RX ORDER — PRENATAL VIT/IRON FUM/FOLIC AC 27MG-0.8MG
1 TABLET ORAL DAILY
Qty: 90 TABLET | Refills: 3 | Status: SHIPPED | OUTPATIENT
Start: 2021-07-27 | End: 2021-11-02

## 2021-07-27 RX ORDER — CICLOPIROX 80 MG/ML
SOLUTION TOPICAL
Qty: 6 ML | Refills: 5 | Status: SHIPPED | OUTPATIENT
Start: 2021-07-27 | End: 2021-11-02

## 2021-07-27 RX ORDER — CYCLOBENZAPRINE HCL 5 MG
5 TABLET ORAL
Qty: 15 TABLET | Refills: 0 | Status: SHIPPED | OUTPATIENT
Start: 2021-07-27 | End: 2021-11-02

## 2021-07-27 RX ORDER — IBUPROFEN 600 MG/1
600 TABLET, FILM COATED ORAL EVERY 6 HOURS PRN
Qty: 90 TABLET | Refills: 3 | Status: SHIPPED | OUTPATIENT
Start: 2021-07-27 | End: 2021-11-02

## 2021-07-27 NOTE — PATIENT INSTRUCTIONS
Patient Education     Here is the plan from today's visit    1. Bilateral low back pain without sciatica, unspecified chronicity  PT at [facility]--Farheenwendy Benton--[phone]  - [facility], PT, HAND AND CHIROPRACTIC REFERRAL - [facility]; Future  - ibuprofen (ADVIL/MOTRIN) 600 MG tablet; Take 1 tablet (600 mg) by mouth every 6 hours as needed for moderate pain  Dispense: 90 tablet; Refill: 3  - cyclobenzaprine (FLEXERIL) 5 MG tablet; Take 1 tablet (5 mg) by mouth nightly as needed for muscle spasms  Dispense: 15 tablet; Refill: 0    2. Health care maintenance  - multivitamin (ONE-DAILY) tablet; Take 1 tablet by mouth daily  Dispense: 90 tablet; Refill: 3      Please call or return to clinic if your symptoms don't go away.    Follow up plan  Return in about 6 weeks (around 9/7/2021) for f/u back pain.      Thank you for coming to [facility] today.  Lab Testing:  **If you had lab testing today and your results are reassuring or normal they will be mailed to you or sent through [employer] within 7 days.   **If the lab tests need quick action we will call you with the results.  The phone number we will call with results is # [phone] (home) . If this is not the best number please call our clinic and change the number.  Medication Refills:  If you need any refills please call your pharmacy and they will contact us.   If you need to  your refill at a new pharmacy, please contact the new pharmacy directly. The new pharmacy will help you get your medications transferred faster.   Scheduling:  If you have any concerns about today's visit or wish to schedule another appointment please call our office during normal business hours [phone] (8-5:00 M-F)  If a referral was made to a [facility] and you don't get a call from central scheduling please call [phone].  If a Mammogram was ordered for you at [facility] call [phone] to schedule or change your appointment.  If you had an  XRay/CT/Ultrasound/MRI ordered the number is [phone] to schedule or change your radiology appointment.   Medical Concerns:  If you have urgent medical concerns please call [phone] at any time of the day.    Arianne Montesinos MD

## 2021-07-27 NOTE — PROGRESS NOTES
SUBJECTIVE: Tonia Smith  a 36 year old female who is here for back pain.     XR lumbar spine negative 2018. Completed a short course of PT in 8/2020.  Patient reports was better after therapy and was better for a long time.   Trying to do exercises but not helping as much.   Pain now is back and is the same as before. Pain is in the middle of the lumbar spine    Not taking medication for the pain. Has not   Denies falls or trauma.       PAST MEDICAL, SOCIAL, SURGICAL AND FAMILY HISTORY: Past medical, surgical, family and social history was reviewed and unchanged since last visit.    ALLERGIES: She has No Known Allergies.    CURRENT MEDICATIONS: She has a current medication list which includes the following prescription(s): ciclopirox, multivitamin, prenatal multivitamin w/iron, and triamcinolone.     REVIEW OF SYSTEMS: 9 point review of systems is negative except as noted above.    EXAM:  There were no vitals taken for this visit.  CONSTITUTIONIAL: healthy, alert and no distress  HEAD: Normocephalic. No masses, lesions, tenderness or abnormalities  ENT: ENT exam normal, no neck nodes or sinus tenderness  SKIN: no suspicious lesions or rashes  GAIT: antalgic  Stance: normal  NEUROLOGIC: Normal muscle tone and strength, reflexes normal, sensation grossly normal.  PSYCHIATRIC: affect normal/bright and mentation appears normal.    MUSCULOSKELETAL:   Back:  Lumbosacral spine area with diffuse tenderness to palpation and fullness of paraspinal muscles. Normal lumbosacral range of motion with pain. Straight leg raise negative. RAEANN negative.        ASSESSMENT/PLAN:  1. Bilateral low back pain without sciatica, unspecified chronicity  Patient with diffuse lumbar back pain without sciatica over the last 2 weeks without related injury or trauma. Recommend ibu and flexeril fo rpain and spasm and return to PT for additional therapy. Return to care in 6 weeks for evaluation of treatment plan.  - [facility], PT, HAND AND  CHIROPRACTIC REFERRAL - [facility]; Future  - ibuprofen (ADVIL/MOTRIN) 600 MG tablet; Take 1 tablet (600 mg) by mouth every 6 hours as needed for moderate pain  Dispense: 90 tablet; Refill: 3  - cyclobenzaprine (FLEXERIL) 5 MG tablet; Take 1 tablet (5 mg) by mouth nightly as needed for muscle spasms  Dispense: 15 tablet; Refill: 0    Chris Richards MD         X-RAY INTERPRETATION:   9/4/2018, lumbar spine XR  Impression:  1.  No acute osseous abnormality.  2.  No substantial degenerative changes.

## 2021-07-27 NOTE — NURSING NOTE
Due to patient being non-English speaking/uses sign language, an  was used for this visit. Only for face-to-face interpretation by an external agency, date and length of interpretation can be found on the scanned worksheet.     name: Rani  Agency: [employer] - telephone  Language: Belinda   Telephone number: [phone]  Type of interpretation: Telephone, spoken

## 2021-08-06 ENCOUNTER — THERAPY VISIT (OUTPATIENT)
Dept: PHYSICAL THERAPY | Facility: CLINIC | Age: 36
End: 2021-08-06
Payer: COMMERCIAL

## 2021-08-06 DIAGNOSIS — M54.42 ACUTE LEFT-SIDED LOW BACK PAIN WITH LEFT-SIDED SCIATICA: ICD-10-CM

## 2021-08-06 PROCEDURE — 97161 PT EVAL LOW COMPLEX 20 MIN: CPT | Mod: GP | Performed by: PHYSICAL THERAPIST

## 2021-08-06 PROCEDURE — 97110 THERAPEUTIC EXERCISES: CPT | Mod: GP | Performed by: PHYSICAL THERAPIST

## 2021-08-06 NOTE — PROGRESS NOTES
[facility] Initial Evaluation     Present: yes     Subjective:  Tonia Smith is a 36 year old female with a lumbar condition. Pt reports that having lower back pain recently without known cause. Pain starts in the low back and travels down the left leg worse with walking and certain positions. Denies other vague symptoms. Would like to get back to prior level pain free.      Symptoms commenced as a result of: unsure. Condition occurred in the following environment: home. Onset of symptoms: 3 weeks ago. Location of symptoms: lower lumbar left and down the left leg below. Pain level on number scale: 7/10. Quality of pain: sharp, shooting. Associated symptoms: none. Pain frequency (constant/intermittent): intermittent. Symptoms are exacerbated by: walking. Symptoms are relieved by: medications. Progression of symptoms since onset (same/better/worse): slightly better. Special tests (x-ray, MRI, CT scan, EMG, bone scan): none recently. Previous treatment: PT. Improvement with previous treatment: some. General health as reported by patient is good. Pertinent medical history includes: See Epic. Medical allergies: see Epic. Other pertinent surgeries: see Epic. Current medications: See Epic. Occupation: housewife. Patient is (working in normal job without restrictions/working in normal job with restrictions/working in an alternate job/not working due to present treatment problem): normal with pain pain. Primary job tasks: home tasks. Barriers at home/work: None reported by patient. Red flags: None reported by patient.  Posture: forward head, rounded shoulders    Gait: mod limp on the left    Screening: negative hip    Flexibility: hamstring tight left    Lumbar Movement Loss Response   Flexion Severely limited pain and with repeated motion worse pain in the back and down the left leg   Extension Severely limited and with repeated motion stiffness in the lower lumbar   Side  bending/glide L Moderately limited   Side bending/glide R Moderately limited    Rotation L Slightly limited   Rotation R Slightly limited    Quadrants (if applicable)      Hip PROM/Strength: ROM WFL hip ROM miguel pain free    Neurological:    Myotomes L R   L1-2 (hip flexion) 4/5 5/5   L3 (knee extension) 4/5 5/5   L4 (ankle DF) 5/5 5/5   L5 (g. toe ext) 5/5 5/5   S1 (ankle PF or knee flex) 5/5 5/5     Sensory Deficit: unremarkable    Dural Signs L R   Slump + -   SLR + -     UMN Tests: Babinski negative    Palpation: unremarkable    Prone Assessment: JERMAIN sharp pain in the back and left leg     Accessory Motion: CPA L4-L5 tender and worse with repeated, press ups severely limited and with repeated motion improves pain response and decreased leg pain    Functional Squat and Balance: poor squat limited by pain, fair SLS miguel    Assessment/Plan:    Patient is a 36 year old female with lumbar complaints.    Patient has the following significant findings with corresponding treatment plan.                Diagnosis 1:  Acute lumbar pain with left sided radiculopathy  Pain -  manual therapy, self management, education, directional preference exercise and home program  Decreased ROM/flexibility - manual therapy and therapeutic exercise  Decreased joint mobility - manual therapy and therapeutic exercise  Decreased strength - therapeutic exercise and therapeutic activities  Impaired gait - gait training  Impaired muscle performance - neuro re-education  Decreased function - therapeutic activities  Impaired posture - neuro re-education    Therapy Evaluation Codes:   1) History comprised of:   Personal factors that impact the plan of care:      Social history/culture.    Comorbidity factors that impact the plan of care are:      None.     Medications impacting care: Pain.  2) Examination of Body Systems comprised of:   Body structures and functions that impact the plan of care:      Hip and Lumbar spine.   Activity limitations that  impact the plan of care are:      Bathing, Bending, Cooking, Driving, Dressing, Lifting, Reading/Computer work, Sitting, Squatting/kneeling, Standing, Walking, Working, Sleeping and Laying down.  3) Clinical presentation characteristics are:   Stable/Uncomplicated.  4) Decision-Making    Low complexity using standardized patient assessment instrument and/or measureable assessment of functional outcome.  Cumulative Therapy Evaluation is: Low complexity.    Previous and current functional limitations:  (See Goal Flow Sheet for this information)    Short term and Long term goals: (See Goal Flow Sheet for this information)     Communication ability:  Patient appears to be able to clearly communicate and understand verbal and written communication and follow directions correctly.  Treatment Explanation - The following has been discussed with the patient:   RX ordered/plan of care  Anticipated outcomes  Possible risks and side effects  This patient would benefit from PT intervention to resume normal activities.   Rehab potential is good.    Frequency:  1 X week, once daily  Duration:  for 6 weeks  Discharge Plan:  Achieve all LTG.  Independent in home treatment program.  Reach maximal therapeutic benefit.    Please refer to the daily flowsheet for treatment today, total treatment time and time spent performing 1:1 timed codes.     Please Contact me with any questions or concerns. Thank you for for patience and cooperation.     Maxim Fox PT, DPT, [facility]  Physical Therapist  [facility] [place]  [phone]

## 2021-08-19 ENCOUNTER — THERAPY VISIT (OUTPATIENT)
Dept: PHYSICAL THERAPY | Facility: CLINIC | Age: 36
End: 2021-08-19
Payer: COMMERCIAL

## 2021-08-19 DIAGNOSIS — M54.42 ACUTE LEFT-SIDED LOW BACK PAIN WITH LEFT-SIDED SCIATICA: ICD-10-CM

## 2021-08-19 PROCEDURE — 97110 THERAPEUTIC EXERCISES: CPT | Mod: GP | Performed by: PHYSICAL THERAPIST

## 2021-08-19 PROCEDURE — 97140 MANUAL THERAPY 1/> REGIONS: CPT | Mod: GP | Performed by: PHYSICAL THERAPIST

## 2021-09-02 ENCOUNTER — THERAPY VISIT (OUTPATIENT)
Dept: PHYSICAL THERAPY | Facility: CLINIC | Age: 36
End: 2021-09-02
Payer: COMMERCIAL

## 2021-09-02 DIAGNOSIS — M54.42 ACUTE LEFT-SIDED LOW BACK PAIN WITH LEFT-SIDED SCIATICA: Primary | ICD-10-CM

## 2021-09-02 PROCEDURE — 97140 MANUAL THERAPY 1/> REGIONS: CPT | Mod: GP | Performed by: PHYSICAL THERAPIST

## 2021-09-02 PROCEDURE — 97110 THERAPEUTIC EXERCISES: CPT | Mod: GP | Performed by: PHYSICAL THERAPIST

## 2021-09-22 ENCOUNTER — THERAPY VISIT (OUTPATIENT)
Dept: PHYSICAL THERAPY | Facility: CLINIC | Age: 36
End: 2021-09-22
Payer: COMMERCIAL

## 2021-09-22 DIAGNOSIS — M54.42 ACUTE LEFT-SIDED LOW BACK PAIN WITH LEFT-SIDED SCIATICA: Primary | ICD-10-CM

## 2021-09-22 PROCEDURE — 97140 MANUAL THERAPY 1/> REGIONS: CPT | Mod: GP | Performed by: PHYSICAL THERAPIST

## 2021-09-22 PROCEDURE — 97110 THERAPEUTIC EXERCISES: CPT | Mod: GP | Performed by: PHYSICAL THERAPIST

## 2021-10-20 ENCOUNTER — THERAPY VISIT (OUTPATIENT)
Dept: PHYSICAL THERAPY | Facility: CLINIC | Age: 36
End: 2021-10-20
Payer: COMMERCIAL

## 2021-10-20 DIAGNOSIS — M54.42 ACUTE LEFT-SIDED LOW BACK PAIN WITH LEFT-SIDED SCIATICA: ICD-10-CM

## 2021-10-20 PROCEDURE — 97110 THERAPEUTIC EXERCISES: CPT | Mod: GP | Performed by: PHYSICAL THERAPIST

## 2021-10-20 NOTE — PROGRESS NOTES
DISCHARGE REPORT    Progress reporting period is from 8/6/21 to 10/20/21.       SUBJECTIVE  Subjective changes noted by patient:  Subjective: Tonia reports that she is feeling bett overall. Will continue HEP, ready for discharge.    Current pain level is 0/10 Current Pain level: 0/10.     Previous pain level was  7/10 Initial Pain level: 7/10.   Changes in function:  Yes (See Goal flowsheet attached for changes in current functional level)  Adverse reaction to treatment or activity: None    OBJECTIVE  Changes noted in objective findings:  Yes, improved motion, function, strength, and control  Objective: Lumbar AROM: Flexion near full no pain and with repeated motion no change, extension near full no and pain tolerates well with repeated motion, SB near full miguel pain free, rotation near full pain free. CPA L1-L5 relief with repeated, JERMAIN no pain, pressups slightly limited and with repeated motion gets bett and no pain.      ASSESSMENT/PLAN  Updated problem list and treatment plan: Diagnosis 1:  Lumbar pain  Pain -  manual therapy, self management, education, directional preference exercise and home program  Decreased strength - therapeutic exercise and therapeutic activities  Impaired muscle performance - neuro re-education  STG/LTGs have been met or progress has been made towards goals:  Yes (See Goal flow sheet completed today.)  Assessment of Progress: The patient has met all of their long term goals.  The patient's progress has plateaued.  Self Management Plans:  Patient has been instructed in a home treatment program.  Patient is independent in a home treatment program.  Patient  has been instructed in self management of symptoms.  Patient is independent in self management of symptoms.  I have re-evaluated this patient and find that the nature, scope, duration and intensity of the therapy is appropriate for the medical condition of the patient.  Tonia continues to require the following intervention to meet STG  and LTG's:  PT intervention is no longer required to meet STG/LTG.    Recommendations:  This patient is ready to be discharged from therapy and continue their home treatment program.    Please refer to the daily flowsheet for treatment today, total treatment time and time spent performing 1:1 timed codes.    Please Contact me with any questions or concerns. Thank you for for patience and cooperation.     Maxim Fox PT, DPT, [facility]  Physical Therapist  [facility] [place]  [phone]

## 2021-11-02 ENCOUNTER — OFFICE VISIT (OUTPATIENT)
Dept: FAMILY MEDICINE | Facility: CLINIC | Age: 36
End: 2021-11-02
Payer: COMMERCIAL

## 2021-11-02 VITALS
DIASTOLIC BLOOD PRESSURE: 89 MMHG | HEIGHT: 61 IN | WEIGHT: 169.6 LBS | HEART RATE: 100 BPM | TEMPERATURE: 98.2 F | OXYGEN SATURATION: 98 % | SYSTOLIC BLOOD PRESSURE: 130 MMHG | BODY MASS INDEX: 32.02 KG/M2 | RESPIRATION RATE: 16 BRPM

## 2021-11-02 DIAGNOSIS — Z11.51 SCREENING FOR HUMAN PAPILLOMAVIRUS: ICD-10-CM

## 2021-11-02 DIAGNOSIS — Z00.00 ROUTINE GENERAL MEDICAL EXAMINATION AT A HEALTH CARE FACILITY: ICD-10-CM

## 2021-11-02 DIAGNOSIS — Z31.9 DESIRE FOR PREGNANCY: Primary | ICD-10-CM

## 2021-11-02 LAB
ALBUMIN SERPL-MCNC: 4 G/DL (ref 3.4–5)
ALP SERPL-CCNC: 57 U/L (ref 40–150)
ALT SERPL W P-5'-P-CCNC: 21 U/L (ref 0–50)
ANION GAP SERPL CALCULATED.3IONS-SCNC: 5 MMOL/L (ref 3–14)
AST SERPL W P-5'-P-CCNC: 20 U/L (ref 0–45)
BILIRUB SERPL-MCNC: 1.1 MG/DL (ref 0.2–1.3)
BUN SERPL-MCNC: 9 MG/DL (ref 7–30)
CALCIUM SERPL-MCNC: 8.5 MG/DL (ref 8.5–10.1)
CHLORIDE BLD-SCNC: 107 MMOL/L (ref 94–109)
CO2 SERPL-SCNC: 27 MMOL/L (ref 20–32)
CREAT SERPL-MCNC: 0.53 MG/DL (ref 0.52–1.04)
ERYTHROCYTE [DISTWIDTH] IN BLOOD BY AUTOMATED COUNT: 12.2 % (ref 10–15)
GFR SERPL CREATININE-BSD FRML MDRD: >90 ML/MIN/1.73M2
GLUCOSE BLD-MCNC: 101 MG/DL (ref 70–99)
HCT VFR BLD AUTO: 41 %
HGB BLD-MCNC: 13.4 G/DL (ref 11.7–15.7)
MCH RBC QN AUTO: 28.8 PG (ref 26.5–33)
MCHC RBC AUTO-ENTMCNC: 32.7 G/DL (ref 31.5–36.5)
MCV RBC AUTO: 88 FL (ref 78–100)
PLATELET # BLD AUTO: 238 10E3/UL (ref 150–450)
POTASSIUM BLD-SCNC: 3.8 MMOL/L (ref 3.4–5.3)
PROT SERPL-MCNC: 7.5 G/DL (ref 6.8–8.8)
RBC # BLD AUTO: 4.65 10E6/UL (ref 3.8–5.2)
SODIUM SERPL-SCNC: 139 MMOL/L (ref 133–144)
WBC # BLD AUTO: 5 10E3/UL (ref 4–11)

## 2021-11-02 PROCEDURE — 80053 COMPREHEN METABOLIC PANEL: CPT | Performed by: STUDENT IN AN ORGANIZED HEALTH CARE EDUCATION/TRAINING PROGRAM

## 2021-11-02 PROCEDURE — 87624 HPV HI-RISK TYP POOLED RSLT: CPT | Performed by: STUDENT IN AN ORGANIZED HEALTH CARE EDUCATION/TRAINING PROGRAM

## 2021-11-02 PROCEDURE — 69209 REMOVE IMPACTED EAR WAX UNI: CPT | Performed by: STUDENT IN AN ORGANIZED HEALTH CARE EDUCATION/TRAINING PROGRAM

## 2021-11-02 PROCEDURE — 93000 ELECTROCARDIOGRAM COMPLETE: CPT | Performed by: STUDENT IN AN ORGANIZED HEALTH CARE EDUCATION/TRAINING PROGRAM

## 2021-11-02 PROCEDURE — 36415 COLL VENOUS BLD VENIPUNCTURE: CPT | Performed by: STUDENT IN AN ORGANIZED HEALTH CARE EDUCATION/TRAINING PROGRAM

## 2021-11-02 PROCEDURE — 85027 COMPLETE CBC AUTOMATED: CPT | Performed by: STUDENT IN AN ORGANIZED HEALTH CARE EDUCATION/TRAINING PROGRAM

## 2021-11-02 PROCEDURE — G0145 SCR C/V CYTO,THINLAYER,RESCR: HCPCS | Performed by: STUDENT IN AN ORGANIZED HEALTH CARE EDUCATION/TRAINING PROGRAM

## 2021-11-02 PROCEDURE — 99395 PREV VISIT EST AGE 18-39: CPT | Mod: 25 | Performed by: STUDENT IN AN ORGANIZED HEALTH CARE EDUCATION/TRAINING PROGRAM

## 2021-11-02 RX ORDER — PRENATAL VIT/IRON FUM/FOLIC AC 27MG-0.8MG
1 TABLET ORAL DAILY
Qty: 90 TABLET | Refills: 3 | Status: SHIPPED | OUTPATIENT
Start: 2021-11-02 | End: 2022-02-24

## 2021-11-02 ASSESSMENT — MIFFLIN-ST. JEOR: SCORE: 1391.68

## 2021-11-02 NOTE — PROGRESS NOTES
Female Physical Note          HPI         Concerns today: Prenatal vitamins, patient is interested in becoming pregnant in the relatively near future, would like to start  Would like ears cleaned out  Palpitations-present about once a month, accompanied by shortness of breath in the evening, nothing makes them better or worse, no provoking factors that she can think of  An [employer]  was used for  this visit.     Patient Active Problem List   Diagnosis     Health Care Home     Female circumcision     S/P  section     Headache     History of severe pre-eclampsia     Elevated blood pressure reading without diagnosis of hypertension       Past Medical History:   Diagnosis Date     Hypertension      Preeclampsia        Previous Medical Care   -[facility]     Family History   Problem Relation Age of Onset     Family History Negative Other               Review of Systems:     Review of Systems:  CONSTITUTIONAL: NEGATIVE for fever, chills, change in weight  INTEGUMENTARY/SKIN: NEGATIVE for worrisome rashes, moles or lesions  EYES: NEGATIVE for vision changes or irritation  ENT/MOUTH: NEGATIVE for ear, mouth and throat problems  RESP: NEGATIVE for significant cough or SOB  BREAST: NEGATIVE for masses, tenderness or discharge  CV: NEGATIVE for chest pain, palpitations or peripheral edema  GI: NEGATIVE for nausea, abdominal pain, heartburn, or change in bowel habits  : NEGATIVE for frequency, dysuria, or hematuria  MUSCULOSKELETAL: NEGATIVE for significant arthralgias or myalgia  NEURO: NEGATIVE for weakness, dizziness or paresthesias  ENDOCRINE: NEGATIVE for temperature intolerance, skin/hair changes  HEME/ALLERGY: NEGATIVE for bleeding problems  PSYCHIATRIC: NEGATIVE for changes in mood or affect    Sleep:   Do you snore most or the night (as reported by a family member)? No  Do you feel sleepy or extremely tired during most of the day? No           Social History     Social History     Socioeconomic  History     Marital status:      Spouse name: Not on file     Number of children: 0     Years of education: 9th grade     Highest education level: Not on file   Occupational History     Occupation: labor     Employer: [employer]   Tobacco Use     Smoking status: Never Smoker     Smokeless tobacco: Never Used   Substance and Sexual Activity     Alcohol use: No     Drug use: No     Sexual activity: Yes     Partners: Male     Birth control/protection: None   Other Topics Concern      Service No     Blood Transfusions No     Caffeine Concern No     Occupational Exposure No     Hobby Hazards No     Sleep Concern No     Stress Concern No     Weight Concern No     Special Diet No     Back Care No     Exercise No     Bike Helmet No     Seat Belt No     Self-Exams No     Parent/sibling w/ CABG, MI or angioplasty before 65F 55M? Not Asked   Social History Narrative     Not on file     Social Determinants of Health     Financial Resource Strain:      Difficulty of Paying Living Expenses:    Food Insecurity:      Worried About Running Out of Food in the Last Year:      Ran Out of Food in the Last Year:    Transportation Needs:      Lack of Transportation (Medical):      Lack of Transportation (Non-Medical):    Physical Activity:      Days of Exercise per Week:      Minutes of Exercise per Session:    Stress:      Feeling of Stress :    Social Connections:      Frequency of Communication with Friends and Family:      Frequency of Social Gatherings with Friends and Family:      Attends [employer] Services:      Active Member of Clubs or Organizations:      Attends Club or Organization Meetings:      Marital Status:    Intimate Partner Violence:      Fear of Current or Ex-Partner:      Emotionally Abused:      Physically Abused:      Sexually Abused:        Marital Status:  Who lives in your household?  Children,     Has anyone hurt you physically, for example by pushing, hitting,  "slapping or kicking you or forcing you to have sex? Denies  Do you feel threatened or controlled by a partner, ex-partner or anyone in your life? Denies    Sexual Health     Sexual concerns: No   STI History: Neg  Pregnancy History:   LMP No LMP recorded.  Last menstrual period , lasts approximately 7 days regular  Last Pap Smear Date:   Lab Results   Component Value Date    PAP NIL 10/11/2016    PAP NIL 2013     Abnormal Pap History: None    Recommended Screening     Pap/HPV cotest every 5 years for women 30-65   Recommended and patient accepted testing.         Physical Exam:     Vitals: /89   Pulse 100   Temp 98.2  F (36.8  C) (Oral)   Resp 16   Ht 1.549 m (5' 1\")   Wt 76.9 kg (169 lb 9.6 oz)   SpO2 98%   BMI 32.05 kg/m    BMI= Body mass index is 32.05 kg/m .   GENERAL: healthy, alert and no distress  EYES: Eyes grossly normal to inspection, extraocular movements - intact, and PERRL  HENT: ear canals- normal, small amount wax present bilaterally; TMs- normal; Nose- normal; Mouth- no ulcers, no lesions  NECK: no tenderness, no adenopathy, no asymmetry, no masses, no stiffness; thyroid- normal to palpation  RESP: lungs clear to auscultation - no rales, no rhonchi, no wheezes  BREAST: no masses, no tenderness, no nipple discharge, no palpable axillary masses or adenopathy  CV: regular rates and rhythm, normal S1 S2, no S3 or S4 and no murmur, no click or rub -  ABDOMEN: soft, no tenderness, no  hepatosplenomegaly, no masses, normal bowel sounds  MS: extremities- no gross deformities noted, no edema  SKIN: no suspicious lesions, no rashes  NEURO: strength and tone- normal, sensory exam- grossly normal, mentation- intact, speech- normal, reflexes- symmetric  BACK: no CVA tenderness, no paralumbar tenderness  - female: cervix- normal, adnexae- normal; uterus- normal, no masses, no discharge  RECTAL- female: no masses, no hemorrhoids  PSYCH: Alert and oriented times 3; speech- " coherent , normal rate and volume; able to articulate logical thoughts, able to abstract reason, no tangential thoughts, no hallucinations or delusions, affect- normal  LYMPHATICS: ant. cervical- normal, post. cervical- normal, axillary- normal, supraclavicular- normal, inguinal- normal      Assessment and Plan      Tonia was seen today for physical.    Diagnoses and all orders for this visit:    Desire for pregnancy  -     Prenatal Vit-Fe Fumarate-FA (PRENATAL MULTIVITAMIN W/IRON) 27-0.8 MG tablet; Take 1 tablet by mouth daily  -     Comprehensive metabolic panel; Future  -     CBC with platelets; Future  -     Comprehensive metabolic panel  -     CBC with platelets    Routine general medical examination at a health care facility  -     Pap screen with HPV - recommended age 30 - 65 years; Future  -     EKG 12-lead complete w/read - Clinics   -EKG read, sinus regular rate and rhythm, intervals appropriate, normal axis, no signs WPW Q waves.  Normal EKG  -     Pap screen with HPV - recommended age 30 - 65 years  -     Comprehensive metabolic panel; Future  -     CBC with platelets; Future  -     Comprehensive metabolic panel  -     CBC with platelets    Screening for human papillomavirus        1. Health Care Maintenance: Normal Physical Exam  Patient did report palpitations that have occurred about once a month are accompanied by shortness of breath.  No chest pain, sweatiness, lightheadedness or dizziness.  Screening EKG ordered, was with read as normal by this provider and attending.  Ordered CBC to check for anemia, ordered CMP to look at liver function, electrolytes.      1. Routine follow up in one year.  2.Contraception: not needed, patient intending to become pregnant  History of preeclampsia, ordered CMP CBC for baseline.  Ordered prenatal vitamins and sent them to patient's preferred pharmacy.    Options for treatment and follow-up care were reviewed with the patient . Tonia Luis and/or guardian engaged  in the decision making process and verbalized understanding of the options discussed and agreed with the final plan.    Olga Chow MD

## 2021-11-02 NOTE — NURSING NOTE
Ear Wash Report    left  and right  ear wash completed.300 mL of water with hydrogen peroxide were irrigated in left ear and 300mL of water with hydrogen peroxide were irrigated in right ear. Patient tolerated well.       Rachel Hudson CMA

## 2021-11-02 NOTE — PATIENT INSTRUCTIONS
Preventive Health Recommendations  Female Ages 26 - 39  Yearly exam:   See your health care provider every year in order to    Review health changes.     Discuss preventive care.      Review your medicines if you your doctor has prescribed any.    For your congested nose: you can use a neti pot, or rinse out your nose with warm water when you are feeling congested    Until age 30: Get a Pap test every three years (more often if you have had an abnormal result).    After age 30: Talk to your doctor about whether you should have a Pap test every 3 years or have a Pap test with HPV screening every 5 years.   You do not need a Pap test if your uterus was removed (hysterectomy) and you have not had cancer.  You should be tested each year for STDs (sexually transmitted diseases), if you're at risk.   Talk to your provider about how often to have your cholesterol checked.  If you are at risk for diabetes, you should have a diabetes test (fasting glucose).  Shots: Get a flu shot each year. Get a tetanus shot every 10 years.   Nutrition:     Eat at least 5 servings of fruits and vegetables each day.    Eat whole-grain bread, whole-wheat pasta and brown rice instead of white grains and rice.    Get adequate Calcium and Vitamin D.     Lifestyle    Exercise at least 150 minutes a week (30 minutes a day, 5 days of the week). This will help you control your weight and prevent disease.    Limit alcohol to one drink per day.    No smoking.     Wear sunscreen to prevent skin cancer.    See your dentist every six months for an exam and cleaning.

## 2021-11-03 NOTE — PROGRESS NOTES
Preceptor Attestation:   Patient seen, evaluated and discussed with the resident. I have verified the content of the note, which accurately reflects my assessment of the patient and the plan of care.   Supervising Physician:  Frederick Cunningham MD

## 2021-11-04 LAB
BKR LAB AP GYN ADEQUACY: NORMAL
BKR LAB AP GYN INTERPRETATION: NORMAL
BKR LAB AP HPV REFLEX: NORMAL
BKR LAB AP LMP: NORMAL
BKR LAB AP PREVIOUS ABNORMAL: NORMAL
PATH REPORT.COMMENTS IMP SPEC: NORMAL
PATH REPORT.RELEVANT HX SPEC: NORMAL

## 2021-11-08 LAB
HUMAN PAPILLOMA VIRUS 16 DNA: NEGATIVE
HUMAN PAPILLOMA VIRUS 18 DNA: NEGATIVE
HUMAN PAPILLOMA VIRUS FINAL DIAGNOSIS: NORMAL
HUMAN PAPILLOMA VIRUS OTHER HR: NEGATIVE

## 2022-02-24 ENCOUNTER — OFFICE VISIT (OUTPATIENT)
Dept: FAMILY MEDICINE | Facility: CLINIC | Age: 37
End: 2022-02-24
Payer: COMMERCIAL

## 2022-02-24 VITALS
RESPIRATION RATE: 16 BRPM | DIASTOLIC BLOOD PRESSURE: 84 MMHG | SYSTOLIC BLOOD PRESSURE: 126 MMHG | BODY MASS INDEX: 30.91 KG/M2 | WEIGHT: 168 LBS | HEART RATE: 98 BPM | OXYGEN SATURATION: 100 % | TEMPERATURE: 97.9 F | HEIGHT: 62 IN

## 2022-02-24 DIAGNOSIS — N93.9 ABNORMAL UTERINE BLEEDING: Primary | ICD-10-CM

## 2022-02-24 DIAGNOSIS — J30.2 SEASONAL ALLERGIC RHINITIS, UNSPECIFIED TRIGGER: ICD-10-CM

## 2022-02-24 DIAGNOSIS — R03.0 ELEVATED BLOOD PRESSURE READING WITHOUT DIAGNOSIS OF HYPERTENSION: ICD-10-CM

## 2022-02-24 DIAGNOSIS — Z32.01 PREGNANCY TEST POSITIVE: ICD-10-CM

## 2022-02-24 LAB — HCG UR QL: POSITIVE

## 2022-02-24 PROCEDURE — 99213 OFFICE O/P EST LOW 20 MIN: CPT | Mod: GC | Performed by: STUDENT IN AN ORGANIZED HEALTH CARE EDUCATION/TRAINING PROGRAM

## 2022-02-24 PROCEDURE — 81025 URINE PREGNANCY TEST: CPT | Performed by: STUDENT IN AN ORGANIZED HEALTH CARE EDUCATION/TRAINING PROGRAM

## 2022-02-24 RX ORDER — FLUTICASONE PROPIONATE 50 MCG
1-2 SPRAY, SUSPENSION (ML) NASAL DAILY
Qty: 18.2 ML | Refills: 4 | Status: SHIPPED | OUTPATIENT
Start: 2022-02-24 | End: 2023-01-10

## 2022-02-24 RX ORDER — PRENATAL VIT/IRON FUM/FOLIC AC 27MG-0.8MG
1 TABLET ORAL DAILY
Qty: 90 TABLET | Refills: 3 | Status: SHIPPED | OUTPATIENT
Start: 2022-02-24 | End: 2022-03-29

## 2022-02-24 NOTE — PATIENT INSTRUCTIONS
Patient Education   Here is the plan from today's visit      Prolonged Bleeding:  - since you've only had one episode of prolonged bleeding, we'll start with a pregnancy test. If you have more abnormal bleeding, then we can get an ultrasound.    For your nasal congestion:  - start flonase nasal spray 1-2 a day for 1-2 weeks.    Conception:  You are most likely to get to pregnant if you have sex about 14 days prior to your next period.  - keep taking the prenatal    Please call or return to clinic if your symptoms don't go away.    Follow up plan  No follow-ups on file.    Thank you for coming to [facility] today.  Lab Testing:  **If you had lab testing today and your results are reassuring or normal they will be mailed to you or sent through [employer] within 7 days.   **If the lab tests need quick action we will call you with the results.  **If you are having labs done on a different day, please call [phone] to schedule at [facility] or [phone] for other [facility]patient [facility] locations. Labs do not offer walk-in appointments.  The phone number we will call with results is # [phone] (home) . If this is not the best number please call our clinic and change the number.  Medication Refills:  If you need any refills please call your pharmacy and they will contact us.   If you need to  your refill at a new pharmacy, please contact the new pharmacy directly. The new pharmacy will help you get your medications transferred faster.   Scheduling:  If you have any concerns about today's visit or wish to schedule another appointment please call our office during normal business hours [phone] (8-5:00 M-F)  If a referral was made to an [facility] specialty provider and you do not get a call from central scheduling, please refer to directions on your visit summary or call our office during normal business hours for assistance.   If a Mammogram was ordered for you at the [facility]  call [phone] to schedule or change your appointment.  If you had an XRay/CT/Ultrasound/MRI ordered the number is [phone] to schedule or change your radiology appointment.   [facility] has limited ultrasound appointments available on Wednesdays, if you would like your ultrasound at [facility], please call [phone] to schedule.   Medical Concerns:  If you have urgent medical concerns please call [phone] at any time of the day.    Mana Schneider MD

## 2022-02-24 NOTE — Clinical Note
DANII to PCP: you saw Tonia for preconception counseling in November, and her pregnancy test was positive today! The less good news is that she had 2 weeks of bleeding a month ago, so unclear right now if this is a viable pregnancy or not. Planning for ultrasound and initial OB appointments after that if everything looks good. Nothing for you to do; just wanted to let you know!  - Mana

## 2022-02-24 NOTE — NURSING NOTE
Due to patient being non-English speaking/uses sign language, an  was used for this visit. Only for face-to-face interpretation by an external agency, date and length of interpretation can be found on the scanned worksheet.     name:   Agency: [facility]   Language: [facility]   Telephone number: [phone]  Type of interpretation: Telephone, spoken

## 2022-02-24 NOTE — Clinical Note
Ashlie Coombs,    Just saw Tonia today for abnormal bleeding. She has been attempting conception and her pregnancy test today is positive! I just ordered her dating USN. LMP is 1/27/22, so 8 weeks out is 3/24/22. She prefers to have the USN here at [facility]. Can you please help her schedule the USN and initial OB appointments? She speaks [place]. Thanks!  -Mana

## 2022-02-24 NOTE — PROGRESS NOTES
Assessment & Plan     Tonia was seen today for recheck.    Diagnoses and all orders for this visit:    Abnormal uterine bleeding  Pregnancy test positive  Patient presenting with 2 weeks of vaginal bleeding without cramping when she normally has a 3 day period every month. Pregnancy test resulted as positive after patient left clinic today. This is a planned pregnancy. Called to relay results with [place] .  EGA 4w0d by LMP today and not having pregnancy symptoms. She may have a viable intrauterine pregnancy or the bleeding may indicate an early spontaneous  (and bHCG is still present). Advised to continue prenatal vitamins, avoid ibuprofen (tylenol recommended for pain), and call for bleeding/cramping or for nausea management. Plan for 8 week dating ultrasound and initial OB appointments following the ultrasound. Plan for aspirin for preeclampsia prophylaxis due to AMA and h/o preeclampsia.  -     Prenatal Vit-Fe Fumarate-FA (PRENATAL MULTIVITAMIN W/IRON) 27-0.8 MG tablet; Take 1 tablet by mouth daily  -     US OB <14 Weeks w Transvaginal Single; Future  -     HCG qualitative urine; Future    Seasonal allergic rhinitis, unspecified trigger  Chronic episodic congestion without sore throat, fever, chills, lymphadenopathy, watery/itchy eyes. Has not tried any medication in the past. Start course of fluticasone nasal spray.    -     fluticasone (FLONASE) 50 MCG/ACT nasal spray; Spray 1-2 sprays into both nostrils daily    Elevated BP reading without a diagnosis of HTN  History of some elevated BPs in clinic. 126/84 on recheck today. No additional workup at this time.    Return in about 1 month (around 3/24/2022) for Follow up with me or Dr. Jeffers for abnormal bleeding.     Mana Schneider MD  [facility] CORKY Smith is a 37 year old  who presents for the following health issues     Patient presents with:  RECHECK: bp f/u     Period:  - last one lasted 2  "weeks  - period is regular every month  - usually lasts 3 days  - not on hormonal medications, just taking prenatal vitamin  - not having pregnancy symptoms  -   - has been attempting conception since November  - heavy, not cramping  - on the heaviest day, changed pad 3 times  - h/o LTCS x2  - no h/o fibroids  - otherwise feeling well, no fever, chills, weight loss  - no change in discharge, no itching, vaginal discomfort  - no concerns for STI  - no dysuria, hematuria  - LMP: 22    Hard to breath through nose  - used to come and go, but has been going on for 1 month  - hasn't tried anything  - has never tried allergy medicine  - sometimes coughs - dry  - eyes not watery or itchy    Conception:  - no plan for sex; 2-3x a week    Chart Review:  - last saw Dr. Jeffers 21 for preconception counseling  - h/o elevated BP without diagnosis of HTN  - h/o preeclampsia    Review of Systems   Constitutional, HEENT, cardiovascular, pulmonary, GI, and  systems are negative, except as otherwise noted.      Objective    /84   Pulse 98   Temp 97.9  F (36.6  C) (Oral)   Resp 16   Ht 1.575 m (5' 2\")   Wt 76.2 kg (168 lb)   LMP 2022   SpO2 100%   BMI 30.73 kg/m    Body mass index is 30.73 kg/m .    Physical Exam   Physical Exam  Constitutional:       Appearance: Normal appearance.   HENT:      Head: Normocephalic.      Right Ear: Tympanic membrane, ear canal and external ear normal.      Left Ear: Tympanic membrane, ear canal and external ear normal.      Nose: Mucosal edema and rhinorrhea present. Rhinorrhea is clear.   Eyes:      Conjunctiva/sclera: Conjunctivae normal.   Cardiovascular:      Rate and Rhythm: Normal rate and regular rhythm.      Heart sounds: Normal heart sounds.   Pulmonary:      Effort: Pulmonary effort is normal.      Breath sounds: Normal breath sounds.   Abdominal:      General: Abdomen is flat. There is no distension.      Palpations: Abdomen is soft. There is no mass. "      Tenderness: There is no abdominal tenderness. There is no guarding or rebound.   Skin:     General: Skin is warm and dry.   Neurological:      Mental Status: She is alert.   Psychiatric:         Mood and Affect: Mood normal.         Behavior: Behavior normal.         Thought Content: Thought content normal.         Judgment: Judgment normal.          ----- Service Performed and Documented by Resident  ------

## 2022-02-28 ENCOUNTER — TELEPHONE (OUTPATIENT)
Dept: FAMILY MEDICINE | Facility: CLINIC | Age: 37
End: 2022-02-28
Payer: COMMERCIAL

## 2022-02-28 DIAGNOSIS — O20.9 BLEEDING IN EARLY PREGNANCY: Primary | ICD-10-CM

## 2022-02-28 NOTE — TELEPHONE ENCOUNTER
RN called pt via [facility] ([id number]) to schedule first OB per dr peñaloza. Pt states she was here Thursday but then on Friday got her period and is still bleeding.    Routing to dr peñaloza to advise if another pregnancy test warranted    Jessica Oquendo RN

## 2022-03-01 ENCOUNTER — APPOINTMENT (OUTPATIENT)
Dept: INTERPRETER SERVICES | Facility: CLINIC | Age: 37
End: 2022-03-01
Payer: COMMERCIAL

## 2022-03-01 NOTE — TELEPHONE ENCOUNTER
"Per dr peñaloza \"  Ashlie Coombs,     I just put in orders for a quantitative HCG and OB ultrasound. With her positive pregnancy test and bleeding, we need to evaluate if there a pregnancy in her uterus, and exclude ectopic pregnancy. Can you schedule her for an ultrasound for this week (Wednesday in clinic, or another day at a different location is okay), and help her schedule a lab only appointment for the [facility]?     Thanks!   Mana    Message text      RN called pt to relay via language services to relay and scheduled for tomorrow    Jessica Oquendo RN    "

## 2022-03-01 NOTE — PROGRESS NOTES
"Patient endorsed having a \"period\" over the weekend to RN when she called to schedule OB appointments. Orders placed for quantitative HCG and USN to evaluate location andviability of pregnancy. USN scheduled for 3/2/22.    Mana Schneider MD   "

## 2022-03-02 ENCOUNTER — ANCILLARY PROCEDURE (OUTPATIENT)
Dept: ULTRASOUND IMAGING | Facility: CLINIC | Age: 37
End: 2022-03-02
Attending: STUDENT IN AN ORGANIZED HEALTH CARE EDUCATION/TRAINING PROGRAM
Payer: COMMERCIAL

## 2022-03-02 ENCOUNTER — LAB (OUTPATIENT)
Dept: LAB | Facility: CLINIC | Age: 37
End: 2022-03-02

## 2022-03-02 DIAGNOSIS — O20.9 BLEEDING IN EARLY PREGNANCY: ICD-10-CM

## 2022-03-02 LAB — B-HCG SERPL-ACNC: 483 IU/L (ref 0–5)

## 2022-03-02 PROCEDURE — 36415 COLL VENOUS BLD VENIPUNCTURE: CPT

## 2022-03-02 PROCEDURE — 76817 TRANSVAGINAL US OBSTETRIC: CPT | Performed by: RADIOLOGY

## 2022-03-02 PROCEDURE — 84702 CHORIONIC GONADOTROPIN TEST: CPT

## 2022-03-02 PROCEDURE — 76801 OB US < 14 WKS SINGLE FETUS: CPT | Performed by: RADIOLOGY

## 2022-03-02 NOTE — NURSING NOTE
Due to patient being non-English speaking/uses sign language, an  was used for this visit. Only for face-to-face interpretation by an external agency, date and length of interpretation can be found on the scanned worksheet.     name: Brenda  Agency: [employer]  Language: [place]   Telephone number: [phone]  Type of interpretation: Telephone, spoken     Note: Pushpa Chambers with [employer] originally scheduled, patient declined that  due to being family.

## 2022-03-05 DIAGNOSIS — O20.9 BLEEDING IN EARLY PREGNANCY: Primary | ICD-10-CM

## 2022-03-05 NOTE — PROGRESS NOTES
Hcg quantitative 483 on 3/2/22. Transvaginal USN 3/2/22 without gestational sac. Corpus luteum present. No ectopic pregnancy noted. Presentation most consistent with an early spontaneous . Repeat quantitative hcg to verify that it is decreasing.    Mana Schneider MD

## 2022-03-07 NOTE — PROGRESS NOTES
RN called pt and left VM with name and callback number via language services ([id number])    Please transfer to RN when pt calls back    Jessica Benja Oquendo RN

## 2022-03-08 NOTE — PROGRESS NOTES
RN called pt again via [employer] services (ID [id number]) to relay and schedule    Jessica Oquendo RN

## 2022-03-09 ENCOUNTER — LAB (OUTPATIENT)
Dept: LAB | Facility: CLINIC | Age: 37
End: 2022-03-09
Payer: COMMERCIAL

## 2022-03-09 DIAGNOSIS — O20.9 BLEEDING IN EARLY PREGNANCY: ICD-10-CM

## 2022-03-09 LAB — B-HCG SERPL-ACNC: 60 IU/L (ref 0–5)

## 2022-03-09 PROCEDURE — 36415 COLL VENOUS BLD VENIPUNCTURE: CPT

## 2022-03-09 PROCEDURE — 84702 CHORIONIC GONADOTROPIN TEST: CPT

## 2022-03-29 ENCOUNTER — OFFICE VISIT (OUTPATIENT)
Dept: FAMILY MEDICINE | Facility: CLINIC | Age: 37
End: 2022-03-29
Payer: COMMERCIAL

## 2022-03-29 VITALS
BODY MASS INDEX: 29.06 KG/M2 | TEMPERATURE: 98 F | RESPIRATION RATE: 16 BRPM | DIASTOLIC BLOOD PRESSURE: 86 MMHG | HEART RATE: 74 BPM | SYSTOLIC BLOOD PRESSURE: 134 MMHG | HEIGHT: 64 IN | OXYGEN SATURATION: 100 % | WEIGHT: 170.2 LBS

## 2022-03-29 DIAGNOSIS — O03.9 COMPLETE MISCARRIAGE: Primary | ICD-10-CM

## 2022-03-29 DIAGNOSIS — J30.2 SEASONAL ALLERGIC RHINITIS, UNSPECIFIED TRIGGER: ICD-10-CM

## 2022-03-29 DIAGNOSIS — Z71.85 VACCINE COUNSELING: ICD-10-CM

## 2022-03-29 PROCEDURE — 99214 OFFICE O/P EST MOD 30 MIN: CPT | Mod: GC | Performed by: STUDENT IN AN ORGANIZED HEALTH CARE EDUCATION/TRAINING PROGRAM

## 2022-03-29 RX ORDER — PRENATAL VIT/IRON FUM/FOLIC AC 27MG-0.8MG
1 TABLET ORAL DAILY
Qty: 90 TABLET | Refills: 3 | Status: SHIPPED | OUTPATIENT
Start: 2022-03-29 | End: 2023-01-10

## 2022-03-29 NOTE — PATIENT INSTRUCTIONS
Patient Education   Here is the plan from today's visit    Attempting Pregnancy:  You are most likely to get pregnant if you have intercourse about 14 days before your next period. This is about 10-18 days after the first day of your period.  - Prenatal Vit-Fe Fumarate-FA (PRENATAL MULTIVITAMIN W/IRON) 27-0.8 MG tablet; Take 1 tablet by mouth daily  Dispense: 90 tablet; Refill: 3    Congestion:  Use the nasal spray every day for 1-2 weeks. If you don't get better after that time, then come back in and we can start another medication.      Please call or return to clinic if your symptoms don't go away.    Follow up plan  Return in 6 months (on 9/29/2022) for Follow up for trying to conceive.    Thank you for coming to [facility] today.  Lab Testing:  **If you had lab testing today and your results are reassuring or normal they will be mailed to you or sent through [employer] within 7 days.   **If the lab tests need quick action we will call you with the results.  **If you are having labs done on a different day, please call [phone] to schedule at [facility] or [phone] for other [facility] Outpatient Lab locations. Labs do not offer walk-in appointments.  The phone number we will call with results is # [phone] (home) . If this is not the best number please call our clinic and change the number.  Medication Refills:  If you need any refills please call your pharmacy and they will contact us.   If you need to  your refill at a new pharmacy, please contact the new pharmacy directly. The new pharmacy will help you get your medications transferred faster.   Scheduling:  If you have any concerns about today's visit or wish to schedule another appointment please call our office during normal business hours [phone] (8-5:00 M-F)  If a referral was made to an [facility] specialty provider and you do not get a call from central scheduling, please refer to directions on your visit  summary or call our office during normal business hours for assistance.   If a Mammogram was ordered for you at the Breast Center call [phone] to schedule or change your appointment.  If you had an XRay/CT/Ultrasound/MRI ordered the number is [phone] to schedule or change your radiology appointment.   [facility] has limited ultrasound appointments available on Wednesdays, if you would like your ultrasound at [facility], please call [phone] to schedule.   Medical Concerns:  If you have urgent medical concerns please call [phone] at any time of the day.    Mana Schneider MD

## 2022-03-29 NOTE — PROGRESS NOTES
Assessment & Plan     Tonia was seen today for recheck and refill request.    Diagnoses and all orders for this visit:    Complete miscarriage  Now  with positive UPT in clinic  with subsequent ultrasound for bleeding 3/2 without a gestational sac. bHCG declined from 483 3 to 60 3. Consistent with an early miscarriage. Patient has been attempting conception. Continue prenatal vitamin. Advised to wait until after her next period before attempting to conceive again. Discussed optimal timing of intercourse for conception. Plan to refer to [facility] if no pregnancy after 6 months of trying. Advised to return to clinic if she has a missed period or if she develops pregnancy symptoms.  -     Prenatal Vit-Fe Fumarate-FA (PRENATAL MULTIVITAMIN W/IRON) 27-0.8 MG tablet; Take 1 tablet by mouth daily    Seasonal allergic rhinitis, unspecified trigger  Patient was prescribed fluticasone nasal spray at last visit , but has only been using the spray intermittently rather than for a 1-2 week course. Advised to use it daily for 1-2 weeks. If no improvement following daily use, advised to return to discuss additional options. Would consider adding an oral antihistamine if no improvement.     Vaccine counseling  Discussed COVID and flu vaccination. Patient due for COVID booster, but she is skeptical because of people being paid to get vaccines. She doesn't have any specific concerns about side effects. Declined vaccination after in depth discussion.       Diagnosis or treatment significantly limited by social determinants of health - language barrier even with     Return in 6 months (on 2022) for Follow up for trying to conceive.     Mana Schneider MD  [facility]   Tonia Smith is a 37 year old  who presents for the following health issues     Patient presents with:  RECHECK:  Follow up Bleeding. No specific concerns or questions.  Refill Request: Prenatal  "Multivamin    Miscarriage:  - bleeding stopped on the day of the ultrasound 3/2  - no bleeding since ultrasound  - last night and today had some cramping. Feels like my period is about to start  - still taking prenatal vitamins    Nasal congestion  - somewhat improved, but persists  - using fluticasone occasionally to help with congestion, maybe once a week  - no itchy/watery eyes    Chart Review:  - saw me . Prolonged bleeding, positive upt. Early usn with no gestational sac. 3/2 [facility] 483. 3/9 60  - now     Review of Systems   Constitutional, HEENT, cardiovascular, pulmonary, GI, and  systems are negative, except as otherwise noted.      Objective    /86   Pulse 74   Temp 98  F (36.7  C) (Oral)   Resp 16   Ht 1.626 m (5' 4\")   Wt 77.2 kg (170 lb 3.2 oz)   SpO2 100%   BMI 29.21 kg/m    Body mass index is 29.21 kg/m .    Physical Exam   Physical Exam  Constitutional:       Appearance: Normal appearance.   HENT:      Head: Normocephalic.      Nose: Congestion present.   Eyes:      Conjunctiva/sclera: Conjunctivae normal.   Cardiovascular:      Rate and Rhythm: Normal rate and regular rhythm.      Heart sounds: Normal heart sounds.   Pulmonary:      Effort: Pulmonary effort is normal.      Breath sounds: Normal breath sounds.   Skin:     General: Skin is dry.   Neurological:      Mental Status: She is alert.   Psychiatric:         Mood and Affect: Mood normal.         Behavior: Behavior normal.         Thought Content: Thought content normal.         Judgment: Judgment normal.          ----- Service Performed and Documented by Resident  ------      "

## 2022-03-29 NOTE — NURSING NOTE
Due to patient being non-English speaking/uses sign language, an  was used for this visit. Only for face-to-face interpretation by an external agency, date and length of interpretation can be found on the scanned worksheet.     name: [id number]   Agency: [employer]  Language: [place]   Telephone number: [phone]  Type of interpretation: Telephone, spoken

## 2022-03-29 NOTE — Clinical Note
Ashlie Alcaraz,    Can you please look into transportation resources for Tonia? I referred her for PT at her last appointment, but she has not been able to go due to lack of transportation.    Thanks!  Mana

## 2022-06-30 ENCOUNTER — OFFICE VISIT (OUTPATIENT)
Dept: FAMILY MEDICINE | Facility: CLINIC | Age: 37
End: 2022-06-30
Payer: COMMERCIAL

## 2022-06-30 VITALS
BODY MASS INDEX: 31.32 KG/M2 | TEMPERATURE: 98 F | HEIGHT: 62 IN | DIASTOLIC BLOOD PRESSURE: 88 MMHG | SYSTOLIC BLOOD PRESSURE: 131 MMHG | HEART RATE: 81 BPM | OXYGEN SATURATION: 100 % | WEIGHT: 170.2 LBS

## 2022-06-30 DIAGNOSIS — R03.0 ELEVATED BLOOD PRESSURE READING WITHOUT DIAGNOSIS OF HYPERTENSION: Primary | ICD-10-CM

## 2022-06-30 DIAGNOSIS — G44.209 TENSION HEADACHE: ICD-10-CM

## 2022-06-30 DIAGNOSIS — L60.2 HYPERTROPHIC TOENAIL: ICD-10-CM

## 2022-06-30 PROCEDURE — 99213 OFFICE O/P EST LOW 20 MIN: CPT | Performed by: FAMILY MEDICINE

## 2022-06-30 RX ORDER — ACETAMINOPHEN 500 MG
500-1000 TABLET ORAL EVERY 6 HOURS PRN
Qty: 30 TABLET | Refills: 1 | Status: SHIPPED | OUTPATIENT
Start: 2022-06-30 | End: 2023-01-10

## 2022-06-30 NOTE — PROGRESS NOTES
"  Assessment & Plan     Elevated blood pressure reading without diagnosis of hypertension  BP wnl today. Discussed that I would recommend starting medication if >140/90. Gave BP cuff for home monitoring.   - Home Blood Pressure Monitor Order    Tension headache  Will try Tylenol to see if helpful. No warning signs on exam or history.   - acetaminophen (TYLENOL) 500 MG tablet  Dispense: 30 tablet; Refill: 1    Hypertrophic toenail  Discussed that this could be nail fungus - to evaluate would need to take nail clipping today and send for culture. Pt declined this (just had nails done) - can return at any time and we could send nails for culture.        BMI:   Estimated body mass index is 31.13 kg/m  as calculated from the following:    Height as of this encounter: 1.575 m (5' 2\").    Weight as of this encounter: 77.2 kg (170 lb 3.2 oz).       Return in about 2 weeks (around 7/14/2022), or if symptoms worsen or fail to improve.    Maria C Patrick [facility] CORKY Randall is a 37 year old, presenting for the following health issues:  RECHECK (Was at dental clinic and was informed of high BP) and Headache (Just today, headache not taking any med )      HPI     1) Blood pressure:   138/90 at dental office, was told she needed evaluation at her PCP for this.    Does not have BP cuff at home.     2) Headache:   Frontal and occipital headache.   Started today  No nausea, vision changes, photophobia or phonophobia.     3) Thick toenails.   \"Is there a cream I can put on them?\"    Review of Systems   Constitutional, HEENT, cardiovascular, pulmonary, gi and gu systems are negative, except as otherwise noted.      Objective    /88   Pulse 81   Temp 98  F (36.7  C) (Oral)   Ht 1.575 m (5' 2\")   Wt 77.2 kg (170 lb 3.2 oz)   LMP  (LMP Unknown)   SpO2 100%   Breastfeeding Unknown   BMI 31.13 kg/m    Body mass index is 31.13 kg/m .  Physical Exam  Constitutional:       Appearance: She is " well-developed.   HENT:      Head: Normocephalic and atraumatic.   Eyes:      Conjunctiva/sclera: Conjunctivae normal.   Pulmonary:      Effort: Pulmonary effort is normal.   Musculoskeletal:         General: Normal range of motion.      Cervical back: Neck supple.      Comments: Tenderness to cervical paraspinal muscles.   Unable to evaluate toenails - wearing nail polish   Skin:     General: Skin is warm and dry.   Neurological:      General: No focal deficit present.      Mental Status: She is alert and oriented to person, place, and time.   Psychiatric:         Behavior: Behavior normal.         Thought Content: Thought content normal.                .  ..  DME (Durable Medical Equipment) Orders and Documentation  Orders Placed This Encounter   Procedures     Home Blood Pressure Monitor Order      The patient was assessed and it was determined the patient is in need of the following listed DME Supplies/Equipment. Please complete supporting documentation below to demonstrate medical necessity.      DME All Other Item(s) Documentation    List reason for need and supporting documentation for medical necessity below for each DME item.     1. Elevated blood pressure

## 2022-06-30 NOTE — NURSING NOTE
Due to patient being non-English speaking/uses sign language, an  was used for this visit. Only for face-to-face interpretation by an external agency, date and length of interpretation can be found on the scanned worksheet.     name: [id number]  Agency:  [employer]   Language: [facility]   Telephone number: [phone]  Type of interpretation: Telephone, spoken

## 2022-06-30 NOTE — PATIENT INSTRUCTIONS
Patient Education   Here is the plan from today's visit    1. Elevated blood pressure reading without diagnosis of hypertension  Please check your blood pressure twice a week at home and return if it is higher than 140/90.     - Home Blood Pressure Monitor Order    2. Tension headache  Try tylenol to treat your headache.   Return if not improved in 2 weeks.     - acetaminophen (TYLENOL) 500 MG tablet; Take 1-2 tablets (500-1,000 mg) by mouth every 6 hours as needed for mild pain  Dispense: 30 tablet; Refill: 1      Please call or return to clinic if your symptoms don't go away.    Follow up plan  No follow-ups on file.    Thank you for coming to [facility] today.  Lab Testing:  **If you had lab testing today and your results are reassuring or normal they will be mailed to you or sent through [employer] within 7 days.   **If the lab tests need quick action we will call you with the results.  **If you are having labs done on a different day, please call [phone] to schedule at [facility] or [phone] for other [facility] Outpatient Lab locations. Labs do not offer walk-in appointments.  The phone number we will call with results is # [phone] (home) . If this is not the best number please call our clinic and change the number.  Medication Refills:  If you need any refills please call your pharmacy and they will contact us.   If you need to  your refill at a new pharmacy, please contact the new pharmacy directly. The new pharmacy will help you get your medications transferred faster.   Scheduling:  If you have any concerns about today's visit or wish to schedule another appointment please call our office during normal business hours [phone] (8-5:00 M-F)  If a referral was made to an [facility] specialty provider and you do not get a call from central scheduling, please refer to directions on your visit summary or call our office during normal business hours for assistance.   If a  Mammogram was ordered for you at the [facility] call [phone] to schedule or change your appointment.  If you had an XRay/CT/Ultrasound/MRI ordered the number is [phone] to schedule or change your radiology appointment.   [facility] has limited ultrasound appointments available on Wednesdays, if you would like your ultrasound at [facility], please call [phone] to schedule.   Medical Concerns:  If you have urgent medical concerns please call [phone] at any time of the day.    Maria C Patrick, DO

## 2022-08-26 ENCOUNTER — LAB REQUISITION (OUTPATIENT)
Dept: LAB | Facility: CLINIC | Age: 37
End: 2022-08-26

## 2022-08-26 LAB
HBV SURFACE AB SERPL IA-ACNC: 0 M[IU]/ML
HBV SURFACE AB SERPL IA-ACNC: NONREACTIVE M[IU]/ML
HBV SURFACE AG SERPL QL IA: NONREACTIVE

## 2022-08-26 PROCEDURE — 87340 HEPATITIS B SURFACE AG IA: CPT | Performed by: INTERNAL MEDICINE

## 2022-08-26 PROCEDURE — 86481 TB AG RESPONSE T-CELL SUSP: CPT | Performed by: INTERNAL MEDICINE

## 2022-08-26 PROCEDURE — 86706 HEP B SURFACE ANTIBODY: CPT | Performed by: INTERNAL MEDICINE

## 2022-08-29 LAB
GAMMA INTERFERON BACKGROUND BLD IA-ACNC: 0.01 IU/ML
M TB IFN-G BLD-IMP: NEGATIVE
M TB IFN-G CD4+ BCKGRND COR BLD-ACNC: 9.99 IU/ML
MITOGEN IGNF BCKGRD COR BLD-ACNC: -0.01 IU/ML
MITOGEN IGNF BCKGRD COR BLD-ACNC: 0 IU/ML
QUANTIFERON MITOGEN: 10 IU/ML
QUANTIFERON NIL TUBE: 0.01 IU/ML
QUANTIFERON TB1 TUBE: 0 IU/ML
QUANTIFERON TB2 TUBE: 0.01

## 2023-01-10 ENCOUNTER — LAB (OUTPATIENT)
Dept: LAB | Facility: CLINIC | Age: 38
End: 2023-01-10
Payer: COMMERCIAL

## 2023-01-10 ENCOUNTER — OFFICE VISIT (OUTPATIENT)
Dept: FAMILY MEDICINE | Facility: CLINIC | Age: 38
End: 2023-01-10
Payer: COMMERCIAL

## 2023-01-10 VITALS
WEIGHT: 165.4 LBS | TEMPERATURE: 98.1 F | DIASTOLIC BLOOD PRESSURE: 86 MMHG | OXYGEN SATURATION: 99 % | SYSTOLIC BLOOD PRESSURE: 131 MMHG | HEART RATE: 88 BPM | BODY MASS INDEX: 30.44 KG/M2 | HEIGHT: 62 IN

## 2023-01-10 DIAGNOSIS — N96 HISTORY OF MULTIPLE MISCARRIAGES: ICD-10-CM

## 2023-01-10 DIAGNOSIS — O03.9 COMPLETE MISCARRIAGE: ICD-10-CM

## 2023-01-10 DIAGNOSIS — Z00.00 ENCOUNTER FOR ANNUAL PHYSICAL EXAM: Primary | ICD-10-CM

## 2023-01-10 DIAGNOSIS — J30.2 SEASONAL ALLERGIC RHINITIS, UNSPECIFIED TRIGGER: ICD-10-CM

## 2023-01-10 DIAGNOSIS — Z31.9 DESIRE FOR PREGNANCY: ICD-10-CM

## 2023-01-10 DIAGNOSIS — G44.209 TENSION HEADACHE: ICD-10-CM

## 2023-01-10 LAB
ALBUMIN SERPL BCG-MCNC: 4.3 G/DL (ref 3.5–5.2)
ALP SERPL-CCNC: 57 U/L (ref 35–104)
ALT SERPL W P-5'-P-CCNC: 25 U/L (ref 10–35)
ANION GAP SERPL CALCULATED.3IONS-SCNC: 11 MMOL/L (ref 7–15)
APTT PPP: 30 SECONDS (ref 22–38)
AST SERPL W P-5'-P-CCNC: 32 U/L (ref 10–35)
BILIRUB SERPL-MCNC: 0.8 MG/DL
BUN SERPL-MCNC: 15.8 MG/DL (ref 6–20)
CALCIUM SERPL-MCNC: 8.5 MG/DL (ref 8.6–10)
CHLORIDE SERPL-SCNC: 104 MMOL/L (ref 98–107)
CHOLEST SERPL-MCNC: 159 MG/DL
CREAT SERPL-MCNC: 0.78 MG/DL (ref 0.51–0.95)
DEPRECATED HCO3 PLAS-SCNC: 23 MMOL/L (ref 22–29)
ERYTHROCYTE [DISTWIDTH] IN BLOOD BY AUTOMATED COUNT: 12.4 % (ref 10–15)
ERYTHROCYTE [SEDIMENTATION RATE] IN BLOOD BY WESTERGREN METHOD: 5 MM/HR (ref 0–20)
GFR SERPL CREATININE-BSD FRML MDRD: >90 ML/MIN/1.73M2
GLUCOSE SERPL-MCNC: 96 MG/DL (ref 70–99)
HCT VFR BLD AUTO: 40 % (ref 35–47)
HDLC SERPL-MCNC: 41 MG/DL
HGB BLD-MCNC: 12.8 G/DL (ref 11.7–15.7)
HOLD SPECIMEN: NORMAL
INR PPP: 1.03 (ref 0.85–1.15)
LDLC SERPL CALC-MCNC: 87 MG/DL
MCH RBC QN AUTO: 29.2 PG (ref 26.5–33)
MCHC RBC AUTO-ENTMCNC: 32 G/DL (ref 31.5–36.5)
MCV RBC AUTO: 91 FL (ref 78–100)
NONHDLC SERPL-MCNC: 118 MG/DL
PLATELET # BLD AUTO: 244 10E3/UL (ref 150–450)
POTASSIUM SERPL-SCNC: 4 MMOL/L (ref 3.4–5.3)
PROLACTIN SERPL 3RD IS-MCNC: 10 NG/ML (ref 5–23)
PROT SERPL-MCNC: 6.7 G/DL (ref 6.4–8.3)
RBC # BLD AUTO: 4.39 10E6/UL (ref 3.8–5.2)
SODIUM SERPL-SCNC: 138 MMOL/L (ref 136–145)
TRIGL SERPL-MCNC: 155 MG/DL
TSH SERPL DL<=0.005 MIU/L-ACNC: 1.19 UIU/ML (ref 0.3–4.2)
WBC # BLD AUTO: 5.7 10E3/UL (ref 4–11)

## 2023-01-10 PROCEDURE — 86147 CARDIOLIPIN ANTIBODY EA IG: CPT

## 2023-01-10 PROCEDURE — 99395 PREV VISIT EST AGE 18-39: CPT | Mod: GC | Performed by: STUDENT IN AN ORGANIZED HEALTH CARE EDUCATION/TRAINING PROGRAM

## 2023-01-10 PROCEDURE — 85730 THROMBOPLASTIN TIME PARTIAL: CPT

## 2023-01-10 PROCEDURE — 83036 HEMOGLOBIN GLYCOSYLATED A1C: CPT

## 2023-01-10 PROCEDURE — 82728 ASSAY OF FERRITIN: CPT

## 2023-01-10 PROCEDURE — 86146 BETA-2 GLYCOPROTEIN ANTIBODY: CPT

## 2023-01-10 PROCEDURE — 80053 COMPREHEN METABOLIC PANEL: CPT

## 2023-01-10 PROCEDURE — 99214 OFFICE O/P EST MOD 30 MIN: CPT | Mod: 25 | Performed by: STUDENT IN AN ORGANIZED HEALTH CARE EDUCATION/TRAINING PROGRAM

## 2023-01-10 PROCEDURE — 80061 LIPID PANEL: CPT

## 2023-01-10 PROCEDURE — 84403 ASSAY OF TOTAL TESTOSTERONE: CPT

## 2023-01-10 PROCEDURE — 85652 RBC SED RATE AUTOMATED: CPT

## 2023-01-10 PROCEDURE — 85027 COMPLETE CBC AUTOMATED: CPT

## 2023-01-10 PROCEDURE — 84443 ASSAY THYROID STIM HORMONE: CPT

## 2023-01-10 PROCEDURE — 84146 ASSAY OF PROLACTIN: CPT

## 2023-01-10 PROCEDURE — 85613 RUSSELL VIPER VENOM DILUTED: CPT

## 2023-01-10 PROCEDURE — 85610 PROTHROMBIN TIME: CPT

## 2023-01-10 PROCEDURE — 85390 FIBRINOLYSINS SCREEN I&R: CPT | Performed by: PATHOLOGY

## 2023-01-10 PROCEDURE — 83498 ASY HYDROXYPROGESTERONE 17-D: CPT

## 2023-01-10 PROCEDURE — 83550 IRON BINDING TEST: CPT

## 2023-01-10 PROCEDURE — 36415 COLL VENOUS BLD VENIPUNCTURE: CPT

## 2023-01-10 PROCEDURE — 83540 ASSAY OF IRON: CPT

## 2023-01-10 RX ORDER — SOD CHLOR,SOD BICARB/NETI POT
PACKET, WITH RINSE DEVICE NASAL
Qty: 40 EACH | Refills: 4 | Status: SHIPPED | OUTPATIENT
Start: 2023-01-10

## 2023-01-10 RX ORDER — FLUTICASONE PROPIONATE 50 MCG
1-2 SPRAY, SUSPENSION (ML) NASAL DAILY
Qty: 18.2 ML | Refills: 4 | Status: SHIPPED | OUTPATIENT
Start: 2023-01-10

## 2023-01-10 RX ORDER — BENZONATATE 100 MG/1
100 CAPSULE ORAL 3 TIMES DAILY PRN
Qty: 50 CAPSULE | Refills: 0 | Status: SHIPPED | OUTPATIENT
Start: 2023-01-10

## 2023-01-10 RX ORDER — ACETAMINOPHEN 500 MG
500-1000 TABLET ORAL EVERY 6 HOURS PRN
Qty: 30 TABLET | Refills: 1 | Status: SHIPPED | OUTPATIENT
Start: 2023-01-10 | End: 2023-03-21

## 2023-01-10 RX ORDER — PRENATAL VIT/IRON FUM/FOLIC AC 27MG-0.8MG
1 TABLET ORAL DAILY
Qty: 90 TABLET | Refills: 3 | Status: SHIPPED | OUTPATIENT
Start: 2023-01-10

## 2023-01-10 ASSESSMENT — ANXIETY QUESTIONNAIRES
IF YOU CHECKED OFF ANY PROBLEMS ON THIS QUESTIONNAIRE, HOW DIFFICULT HAVE THESE PROBLEMS MADE IT FOR YOU TO DO YOUR WORK, TAKE CARE OF THINGS AT HOME, OR GET ALONG WITH OTHER PEOPLE: NOT DIFFICULT AT ALL
6. BECOMING EASILY ANNOYED OR IRRITABLE: NOT AT ALL
1. FEELING NERVOUS, ANXIOUS, OR ON EDGE: NOT AT ALL
7. FEELING AFRAID AS IF SOMETHING AWFUL MIGHT HAPPEN: NOT AT ALL
GAD7 TOTAL SCORE: 0
GAD7 TOTAL SCORE: 0
2. NOT BEING ABLE TO STOP OR CONTROL WORRYING: NOT AT ALL
5. BEING SO RESTLESS THAT IT IS HARD TO SIT STILL: NOT AT ALL
3. WORRYING TOO MUCH ABOUT DIFFERENT THINGS: NOT AT ALL

## 2023-01-10 ASSESSMENT — ENCOUNTER SYMPTOMS
PALPITATIONS: 1
ABDOMINAL PAIN: 1
FREQUENCY: 0
PARESTHESIAS: 0
BREAST MASS: 0
NAUSEA: 0
HEADACHES: 0
FEVER: 0
HEMATOCHEZIA: 0
COUGH: 1
JOINT SWELLING: 0
CONSTIPATION: 0
SORE THROAT: 1
DYSURIA: 0
SHORTNESS OF BREATH: 1
WEAKNESS: 0
MYALGIAS: 0
HEARTBURN: 0
EYE PAIN: 0
ARTHRALGIAS: 0
NERVOUS/ANXIOUS: 0
HEMATURIA: 0
DIZZINESS: 0
CHILLS: 0
DIARRHEA: 0

## 2023-01-10 ASSESSMENT — PATIENT HEALTH QUESTIONNAIRE - PHQ9
5. POOR APPETITE OR OVEREATING: NOT AT ALL
SUM OF ALL RESPONSES TO PHQ QUESTIONS 1-9: 0

## 2023-01-10 NOTE — PATIENT INSTRUCTIONS
I refilled your allergy medications and your prenatal vitamins    I will reach out with lab results    We will see you at 2pm!      OB-Gyn referral  [facility]  [phone]  Scheduling to contact patient directly.

## 2023-01-10 NOTE — PROGRESS NOTES
SUBJECTIVE:   CC: Tonia is an 38 year old who presents for preventive health visit.   Patient has been advised of split billing requirements and indicates understanding: Yes     Healthy Habits:     Getting at least 3 servings of Calcium per day:  NO    Bi-annual eye exam:  Yes    Dental care twice a year:  Yes    Sleep apnea or symptoms of sleep apnea:  None    Diet:  Regular (no restrictions)    Frequency of exercise:  2-3 days/week    Duration of exercise:  30-45 minutes    Taking medications regularly:  Yes    Medication side effects:  None    PHQ-2 Total Score: 0    Additional concerns today:  Yes    Patient has been having sex regularly without protecting while using prenatal vitamins and has had multiple miscarriages, unable to bear another child. Still having regular menstrual cycles at the beginning of the month that have not changed. No other hormonal use. No other medication use consistently. Has had multiple miscarriages late enough to be confirmed complete miscarriages.     Today's PHQ-2 Score:   PHQ-2 ( 1999 [employer]) 1/10/2023   Q1: Little interest or pleasure in doing things 0   Q2: Feeling down, depressed or hopeless 0   PHQ-2 Score 0   PHQ-2 Total Score (12-17 Years)- Positive if 3 or more points; Administer PHQ-A if positive -   Q1: Little interest or pleasure in doing things Not at all   Q2: Feeling down, depressed or hopeless Not at all   PHQ-2 Score 0       Have you ever done Advance Care Planning? (For example, a Health Directive, POLST, or a discussion with a medical provider or your loved ones about your wishes):     Social History     Tobacco Use     Smoking status: Never     Smokeless tobacco: Never   Substance Use Topics     Alcohol use: No       Alcohol Use 1/10/2023   Prescreen: >3 drinks/day or >7 drinks/week? No     Reviewed orders with patient.  Reviewed health maintenance and updated orders accordingly - Yes  Lab work is in process    Breast Cancer Screening:  Any new diagnosis of  family breast, ovarian, or bowel cancer? No    FHS-7: No flowsheet data found.  click delete button to remove this line now  Patient under 40 years of age: Routine Mammogram Screening not recommended.   Pertinent mammograms are reviewed under the imaging tab.    History of abnormal Pap smear:   NO - age 30-65 PAP every 5 years with negative HPV co-testing recommended  Last 3 Pap and HPV Results:   PAP / HPV Latest Ref Rng & Units 2021 10/11/2016 2013   PAP   Negative for Intraepithelial Lesion or Malignancy (NILM) - -   PAP (Historical) - - NIL NIL   HPV16 Negative Negative Negative -   HPV18 Negative Negative Negative -   HRHPV Negative Negative Negative -     PAP / HPV Latest Ref Rng & Units 2021 10/11/2016 2013   PAP   Negative for Intraepithelial Lesion or Malignancy (NILM) - -   PAP (Historical) - - NIL NIL   HPV16 Negative Negative Negative -   HPV18 Negative Negative Negative -   HRHPV Negative Negative Negative -     Reviewed and updated as needed this visit by clinical staff   Tobacco  Allergies  Meds              Reviewed and updated as needed this visit by Provider                 Past Medical History:   Diagnosis Date     Hypertension      Preeclampsia       Past Surgical History:   Procedure Laterality Date      SECTION  10/5/2013    Procedure:  SECTION;;  Surgeon: Noemy Fowler MD;  Location: [facility]      SECTION N/A 2016    Procedure:  SECTION;  Surgeon: Leah Patiño MD;  Location: [facility] L[facility]     GYN SURGERY       OB History    Para Term  AB Living   3 2 1 1 1 2   SAB IAB Ectopic Multiple Live Births   1 0 0 0 2      # Outcome Date GA Lbr Hitesh/2nd Weight Sex Delivery Anes PTL Lv   3 SAB 22 4w0d    AB, COMPLETE      2 Term 16 40w4d  4.224 kg (9 lb 5 oz) F CS-LTranv   BESSIE      Name: SANDRA GRIMES      Apgar1: 8  Apgar5: 9   1  10/05/13 35w5d  1.77 kg (3 lb 14.4 oz) F CS-LTranv Spinal   "BESSIE      Name: WILY GRIMES      Apgar1: 8  Apgar5: 9       Review of Systems   Constitutional: Negative for chills and fever.   HENT: Positive for congestion and sore throat. Negative for ear pain and hearing loss.    Eyes: Negative for pain and visual disturbance.   Respiratory: Positive for cough and shortness of breath.    Cardiovascular: Positive for palpitations. Negative for chest pain and peripheral edema.   Gastrointestinal: Positive for abdominal pain. Negative for constipation, diarrhea, heartburn, hematochezia and nausea.   Breasts:  Negative for tenderness, breast mass and discharge.   Genitourinary: Negative for dysuria, frequency, genital sores, hematuria, pelvic pain, urgency, vaginal bleeding and vaginal discharge.   Musculoskeletal: Negative for arthralgias, joint swelling and myalgias.   Skin: Negative for rash.   Neurological: Negative for dizziness, weakness, headaches and paresthesias.   Psychiatric/Behavioral: Negative for mood changes. The patient is not nervous/anxious.         OBJECTIVE:   /86   Pulse 88   Temp 98.1  F (36.7  C) (Oral)   Ht 1.575 m (5' 2\")   Wt 75 kg (165 lb 6.4 oz)   SpO2 99%   BMI 30.25 kg/m    Physical Exam  GENERAL: healthy, alert and no distress  EYES: Eyes grossly normal to inspection, PERRL and conjunctivae and sclerae normal  HENT: ear canals and TM's normal, nose and mouth without ulcers or lesions  NECK: no adenopathy, no asymmetry, masses, or scars and thyroid normal to palpation  RESP: lungs clear to auscultation - no rales, rhonchi or wheezes  CV: regular rate and rhythm, normal S1 S2, no S3 or S4, no murmur, click or rub, no peripheral edema and peripheral pulses strong  ABDOMEN: soft, nontender, no hepatosplenomegaly, no masses and bowel sounds normal  MS: no gross musculoskeletal defects noted, no edema  SKIN: no suspicious lesions or rashes  NEURO: Normal strength and tone, mentation intact and speech normal  PSYCH: mentation appears " normal, affect normal/bright    Diagnostic Test Results:  Labs reviewed in Epic  No results found for this or any previous visit (from the past 24 hour(s)).    ASSESSMENT/PLAN:   (Z00.00) Encounter for annual physical exam  (primary encounter diagnosis)  Comment: Patient largely healthy, UTD on PAP smear, no family hx breast or colon cancer that would trigger earlier screening. No alcohol or smoking, moderately physically active, have dentist. Split visit for beginning fertility work-up (chronic and discussed at previous visit in 2021).     (O03.9) Complete miscarriage  (N96) History of multiple miscarriages  Comment: LMP regular Jan 1-7, normal for her. Has been trying for pregnancy and another child for 3 years with regular sex and one known pregnancy that ended in complete miscarriage, believes she has had other miscarriages prior to this event. Takes prenatal, doesn't smoke or drink, is physically active, eats relatively well, takes no medications that should compromise her fertility, has had children x2 with this partner, presence of previous children recommends against underlying structural or genetic component. Have ordered baseline fertility labs, will then refer to Ob/Gyn and discuss referral to outside fertility services.    Plan: Prenatal Vit-Fe Fumarate-FA (PRENATAL         MULTIVITAMIN W/IRON) 27-0.8 MG tablet,         Comprehensive metabolic panel, CBC with         Platelets and Reflex to Iron Studies, 17 [place]         progesterone, Testosterone total, Lipid panel         reflex to direct LDL Non-fasting, TSH with free        T4 reflex, Hemoglobin A1c, INR, Partial         thromboplastin time, Prolactin    (J30.2) Seasonal allergic rhinitis, unspecified trigger  (G44.209) Tension headache  Comment: Patient with chronic allergic rhinosinusitis resulting in postnasal drip postnasal drip and cough postnasal drip and cough, has endorsed a lot of improvement with saline nasal saline nasal [place] rinse and  "saline nasal [place] rinse as well as Flonase nasal spray requesting refill requesting refills of both of these medications for her chronic unchanged condition, both were provided.   Plan: fluticasone (FLONASE) 50 MCG/ACT nasal spray,         Sodium Chloride-Sodium Bicarb (AYR SALINE NASAL        NETI RINSE) 1.57 g PACK, benzonatate (TESSALON)        100 MG capsule, acetaminophen         (TYLENOL) 500 MG tablet    COUNSELING:  Reviewed preventive health counseling, as reflected in patient instructions  Special attention given to:        Regular exercise       Healthy diet/nutrition       Vision screening       Hearing screening       Alcohol Use       Contraception       Family planning       Osteoporosis prevention/bone health       Safe sex practices/STD prevention       HIV screeninx in teen years, 1x in adult years, and at intervals if high risk       (Margaret)menopause management    BMI:   Estimated body mass index is 30.25 kg/m  as calculated from the following:    Height as of this encounter: 1.575 m (5' 2\").    Weight as of this encounter: 75 kg (165 lb 6.4 oz).   This is not an inappropriate BMI, patient is active and eats home meals often. No intervention at this time.     Patient requires an  to complete the visit or speaks English as a second language.  Not speaking the primary language of the healthcare system is a social determinant of health that impacts how they interact with the medical system.    She reports that she has never smoked. She has never used smokeless tobacco.  Olga Chow MD  [facility]  "

## 2023-01-11 LAB
DRVVT SCREEN RATIO: 0.65
FERRITIN SERPL-MCNC: 38 NG/ML (ref 6–175)
HBA1C MFR BLD: 5.7 %
IRON BINDING CAPACITY (ROCHE): 302 UG/DL (ref 240–430)
IRON SATN MFR SERPL: 27 % (ref 15–46)
IRON SERPL-MCNC: 83 UG/DL (ref 37–145)
LA PPP-IMP: NEGATIVE
LUPUS INTERPRETATION: NORMAL
PTT RATIO: 1.11
THROMBIN TIME: 16.3 SECONDS (ref 13–19)

## 2023-01-11 NOTE — PROGRESS NOTES
Preceptor Attestation:   Patient seen, evaluated and discussed with the resident. I have verified the content of the note, which accurately reflects my assessment of the patient and the plan of care.   Supervising Physician:  Von Odom MD

## 2023-01-12 LAB — 17OHP SERPL-MCNC: 16 NG/DL

## 2023-01-13 LAB
B2 GLYCOPROT1 IGG SERPL IA-ACNC: 1.2 U/ML
B2 GLYCOPROT1 IGM SERPL IA-ACNC: <2.4 U/ML
CARDIOLIPIN IGG SER IA-ACNC: <2 GPL-U/ML
CARDIOLIPIN IGG SER IA-ACNC: NEGATIVE
CARDIOLIPIN IGM SER IA-ACNC: <2 MPL-U/ML
CARDIOLIPIN IGM SER IA-ACNC: NEGATIVE

## 2023-01-21 LAB — TESTOST SERPL-MCNC: 13 NG/DL (ref 8–60)

## 2023-03-21 ENCOUNTER — OFFICE VISIT (OUTPATIENT)
Dept: FAMILY MEDICINE | Facility: CLINIC | Age: 38
End: 2023-03-21
Payer: COMMERCIAL

## 2023-03-21 VITALS
WEIGHT: 167.7 LBS | TEMPERATURE: 97.9 F | RESPIRATION RATE: 16 BRPM | SYSTOLIC BLOOD PRESSURE: 135 MMHG | HEART RATE: 77 BPM | OXYGEN SATURATION: 100 % | BODY MASS INDEX: 30.67 KG/M2 | DIASTOLIC BLOOD PRESSURE: 93 MMHG

## 2023-03-21 DIAGNOSIS — M25.561 RIGHT KNEE PAIN, UNSPECIFIED CHRONICITY: Primary | ICD-10-CM

## 2023-03-21 PROCEDURE — 99213 OFFICE O/P EST LOW 20 MIN: CPT | Mod: GC | Performed by: STUDENT IN AN ORGANIZED HEALTH CARE EDUCATION/TRAINING PROGRAM

## 2023-03-21 RX ORDER — ACETAMINOPHEN 500 MG
500-1000 TABLET ORAL EVERY 6 HOURS PRN
Qty: 90 TABLET | Refills: 1 | Status: SHIPPED | OUTPATIENT
Start: 2023-03-21

## 2023-03-21 NOTE — PROGRESS NOTES
Preceptor Attestation:  Patient seen and evaluated in person. I discussed the patient with the resident. I have verified the content of the note, which accurately reflects my assessment of the patient and the plan of care.   Supervising Physician:  Dea Urrutia DO

## 2023-03-21 NOTE — PROGRESS NOTES
"  Assessment & Plan     Right knee pain, unspecified chronicity  Patient with acute on chronic knee pain with equivocal lumbar radiculopathy. No red flag signs on history or exam and already improving with home exercises. Re-referral to PT and conservative treatment with diclofenac and tylenol. Return precautions discussed given unclear \"numbness\" without any definitive distribution or differention between this and pain despite phone interpretor use.   - Physical Therapy Referral  - diclofenac (VOLTAREN) 1 % topical gel  Dispense: 150 g; Refill: 1        Return in about 6 weeks (around 5/2/2023).    Chris Richards MD  [facility] CORKY Randall is a 38 year old, presenting for the following health issues:  Musculoskeletal Problem (Pt reports bilateral leg pain. She having more pain on left leg. Pt also states high blood pressure.) and Refill Request (Prenatal Vit-Fe Fumarate-FA (PRENATAL MULTIVITAMIN W/IRON) 27-0.8 MG tablet)      HPI   R distal leg pain since Friday  Pain feels like the blood is not circlating  The distal leg is now cold and numb - numbness started two days ago  Cannot feel below knee when rubbing  Pain is increased when walking; nothing else makes it feel worse  Get tired when walking - feeling the pain brings this on  Has experienced these symptoms before - was sent to PT and felt better  Has been doing the PT exercises that she did before and is feeling better with that  Not concerned about anything in particular  Also feels pain when pressing on break pedal  Pain is at a 6 today  Pain started spontaneously      Review of Systems   Constitutional, HEENT, cardiovascular, pulmonary, gi and gu systems are negative, except as otherwise noted.      Objective    BP (!) 135/93 (BP Location: Left arm, Patient Position: Sitting, Cuff Size: Adult Regular)   Pulse 77   Temp 97.9  F (36.6  C) (Oral)   Resp 16   Wt 76.1 kg (167 lb 11.2 oz)   SpO2 100%   BMI 30.67 kg/m  "   Body mass index is 30.67 kg/m .  Physical Exam  Vitals reviewed.   Constitutional:       General: She is not in acute distress.     Appearance: Normal appearance. She is not ill-appearing.   HENT:      Head: Normocephalic and atraumatic.   Eyes:      Conjunctiva/sclera: Conjunctivae normal.   Cardiovascular:      Comments: DP and PD pulses normal  Pulmonary:      Effort: Pulmonary effort is normal. No respiratory distress.   Musculoskeletal:      Comments: Back:  Tender:  lumbar spinous processes and sacrum diffusely  Range of Motion: normal with exacerbation of pain in all directions  Strength: 5/5 in all directions  Straight leg raise equivocal.    Hip Exam: Hip ROM full without pain    Right Knee:      Inspection:       AP/lateral alignment normal  Tender: posterior knee at insertions of gastroc      Non-tender: patellar facets, MCL, LCL, lateral joint line, medial joint line, IT band  Active Range of Motion: all normal  Strength: quad  5/5, Hamstrings  5/5, Gastroc  5/5, Tibialis anterior  5/5, Permeals  5/5 and core strength  5/5 hip abductors and other core muscles   Special tests: lax Valgus stress test, normal Varus, negative Lachman's test, negative posterior drawer,negative Merlin's   Skin:     General: Skin is warm and dry.      Capillary Refill: Capillary refill takes less than 2 seconds.      Comments: No skin changes to RLE   Neurological:      General: No focal deficit present.      Mental Status: She is alert.      Motor: No weakness.      Gait: Gait normal.   Psychiatric:         Behavior: Behavior normal.         Thought Content: Thought content normal.

## 2023-04-04 ENCOUNTER — THERAPY VISIT (OUTPATIENT)
Dept: PHYSICAL THERAPY | Facility: CLINIC | Age: 38
End: 2023-04-04
Attending: STUDENT IN AN ORGANIZED HEALTH CARE EDUCATION/TRAINING PROGRAM
Payer: COMMERCIAL

## 2023-04-04 DIAGNOSIS — M25.561 RIGHT KNEE PAIN, UNSPECIFIED CHRONICITY: ICD-10-CM

## 2023-04-04 PROCEDURE — 97110 THERAPEUTIC EXERCISES: CPT | Mod: GP

## 2023-04-04 PROCEDURE — 97140 MANUAL THERAPY 1/> REGIONS: CPT | Mod: GP

## 2023-04-04 ASSESSMENT — ACTIVITIES OF DAILY LIVING (ADL)
STAND: ACTIVITY IS FAIRLY DIFFICULT
SWELLING: THE SYMPTOM AFFECTS MY ACTIVITY MODERATELY
HOW_WOULD_YOU_RATE_THE_CURRENT_FUNCTION_OF_YOUR_KNEE_DURING_YOUR_USUAL_DAILY_ACTIVITIES_ON_A_SCALE_FROM_0_TO_100_WITH_100_BEING_YOUR_LEVEL_OF_KNEE_FUNCTION_PRIOR_TO_YOUR_INJURY_AND_0_BEING_THE_INABILITY_TO_PERFORM_ANY_OF_YOUR_USUAL_DAILY_ACTIVITIES?: 65
KNEEL ON THE FRONT OF YOUR KNEE: ACTIVITY IS FAIRLY DIFFICULT
SQUAT: ACTIVITY IS SOMEWHAT DIFFICULT
STIFFNESS: I HAVE THE SYMPTOM BUT IT DOES NOT AFFECT MY ACTIVITY
WALK: ACTIVITY IS MINIMALLY DIFFICULT
GO UP STAIRS: ACTIVITY IS SOMEWHAT DIFFICULT
LIMPING: THE SYMPTOM AFFECTS MY ACTIVITY MODERATELY
WEAKNESS: I HAVE THE SYMPTOM BUT IT DOES NOT AFFECT MY ACTIVITY
GIVING WAY, BUCKLING OR SHIFTING OF KNEE: THE SYMPTOM AFFECTS MY ACTIVITY SLIGHTLY
GO DOWN STAIRS: ACTIVITY IS FAIRLY DIFFICULT
SIT WITH YOUR KNEE BENT: ACTIVITY IS SOMEWHAT DIFFICULT
PAIN: I HAVE THE SYMPTOM BUT IT DOES NOT AFFECT MY ACTIVITY
HOW_WOULD_YOU_RATE_THE_OVERALL_FUNCTION_OF_YOUR_KNEE_DURING_YOUR_USUAL_DAILY_ACTIVITIES?: NEARLY NORMAL

## 2023-04-04 NOTE — PROGRESS NOTES
Physical Therapy Initial Evaluation  4/4/2023     Precautions/Restrictions/MD instructions: eval and treat    Therapist Assessment: Tonia Smith is a 38 year old female patient presenting to Physical Therapy with R>L lower leg pain . Patient demonstrates limited lumbar ROM, good response to repeated extension exercises and R quad and ankle DF weakness. Signs and symptoms are consistent with lumbar radiculopathy. These impairments limit their ability to walk, stand, negotiate stairs and lay on the right side without pain. Skilled PT services are necessary in order to reduce impairments and improve independent function.    Subjective:   Chief Complaint: R>L lower leg pain  Associated symptoms: numbness starts in big toe to sole of foot, limping  Onset date: 1 month ago, got worse 3 weeks ago  JUNE: insidious    Pain severity:  4/10 current  Location of pain: calves, anterior knee to toes   Quality of Pain: sharp, burning   Better: sitting  Worse:  Walking, standing, laying on the right side, stairs  Time of day: activity/position dependent  Progression of Symptoms since onset:  Since onset, these symptoms are getting worse  Previous treatment: has included PT; effect was good      General health as reported by patient: good.    PMH: Has previous history of back pain   Surgical history/trauma:  None.  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, pushing, pulling, repetitive motions, walking  Current exercise regimen/Recreational activities: house chores  Barriers to treatment: none      Patient's Goal(s): reduce pain    Objective:    Posture: seated with posterior pelvic tilt  Standing posture: Shifted to right but corrected with SG  Gait: slight trendelenburg       Lumbar ROM:    Full flexion *, mod limitations extension, symmetrical SG    Myotomes:  Hip flexion L 5/5, R 4+/5  Knee ext: L 5/5, R 4/5  Ankle DF: L 5/5, R 3+/5  Big toe DF: L 5/5, R  4/5  Knee flexion: L 5/5, R 5/5    Slump: negative Biliaterally    Segmental joint mobility:  Hypomobile lumbar spine throughout with PA mobs    Directional preference: trial extension  Repeated prone press ups effect: resolution of pain when standing, sitting and walking        ASSESSMENT/PLAN  Patient is a 38 year old female with lumbar complaints.    Patient has the following significant findings with corresponding treatment plan.                Diagnosis 1:  Lumbar radiculpathy    Pain -  hot/cold therapy, manual therapy, splint/taping/bracing/orthotics, self management, education, directional preference exercise and home program  Decreased ROM/flexibility - manual therapy, therapeutic exercise and home program  Decreased joint mobility - manual therapy, therapeutic exercise and home program  Decreased strength - therapeutic exercise, therapeutic activities and home program  Impaired posture - neuro re-education and home program    Therapy Evaluation Codes:   1) History comprised of:   Personal factors that impact the plan of care:      None.    Comorbidity factors that impact the plan of care are:      High blood pressure.     Medications impacting care: None.  2) Examination of Body Systems comprised of:   Body structures and functions that impact the plan of care:      Knee and Lumbar spine.   Activity limitations that impact the plan of care are:      Stairs, Standing and Walking.  3) Clinical presentation characteristics are:   Stable/Uncomplicated.  4) Decision-Making    Low complexity using standardized patient assessment instrument and/or measureable assessment of functional outcome.  Cumulative Therapy Evaluation is: Low complexity.    Previous and current functional limitations:  (See Goal Flow Sheet for this information)    Short term and Long term goals: (See Goal Flow Sheet for this information)     Communication ability:  Patient appears to be able to clearly communicate and understand verbal and  written communication and follow directions correctly.  Treatment Explanation - The following has been discussed with the patient:   RX ordered/plan of care  Anticipated outcomes  Possible risks and side effects  This patient would benefit from PT intervention to resume normal activities.   Rehab potential is good.    Frequency:  1 X week, once daily  Duration:  for 6-8 weeks  Discharge Plan:  Achieve all LTG.  Independent in home treatment program.  Reach maximal therapeutic benefit.    Please refer to the daily flowsheet for treatment today, total treatment time and time spent performing 1:1 timed codes.

## 2023-04-04 NOTE — PROGRESS NOTES
[facility]    OUTPATIENT Physical Therapy ORTHOPEDIC EVALUATION  PLAN OF TREATMENT FOR OUTPATIENT REHABILITATION  (COMPLETE FOR INITIAL CLAIMS ONLY)  Patient's Last Name, First Name, M.I.  YOB: 1985  Tonia Smith       Provider s Name:  [facility]   Medical Record No.  [id number]   Start of Care Date:  04/04/23   Onset Date:   03/21/23   Treatment Diagnosis:  lumbar radic R>L Medical Diagnosis:  Right knee pain, unspecified chronicity       Goals:     04/04/23 0500   Body Part   Goals listed below are for Low back   Goal #1   Goal #1 ambulation   Previous Functional Level No restrictions   Current Functional Level Minutes patient can walk   Performance Level 5 minutes with 8/10 pain   STG Target Performance Minutes patient will be able to walk   Performance Level 15 minutes with pain 6/10   Rationale for safe household ambulation;for safe outdoor household ambulation;for safe community ambulation;for safe work place ambulation;to maintain proper body mechanics/posture while ambulating to avoid additional compensatory injury due to improper gait mechanics;to promote a healthy and active lifestyle   Due Date 05/04/23    LTG Target Performance Minutes patient will be able to  walk   Performance Level 30 minutes   Rationale for safe household ambulation;for safe outdoor household ambulation;for safe community ambulation;for safe work place ambulation;to promote a healthy and active lifestyle;to maintain proper body mechanics/posture while ambulating to avoid additional compensatory injury due to improper gait mechanics   Due Date 06/04/23         Therapy Frequency:  1x/week  Predicted Duration of Therapy Intervention:  8 weeks    Maulik Perez PT                 I CERTIFY THE NEED FOR THESE SERVICES FURNISHED UNDER        THIS PLAN OF TREATMENT AND WHILE UNDER MY CARE      (Physician attestation of this document indicates review and certification of the therapy plan).                     Certification Date From:  04/04/23   Certification Date To:  05/30/23    Referring Provider:  Dea Urrutia    Initial Assessment        See Epic Evaluation SOC Date: 04/04/23

## 2023-12-15 ENCOUNTER — TELEPHONE (OUTPATIENT)
Dept: FAMILY MEDICINE | Facility: CLINIC | Age: 38
End: 2023-12-15
Payer: COMMERCIAL

## 2023-12-15 NOTE — TELEPHONE ENCOUNTER
Tonia Smith called to schedule an appointment for TB screening.        TB Screening questions  Have you had recent contact with a person with active tuberculosis (TB)?  No, continue to next question.  Have you ever been treated for tuberculosis (TB) or latent TB before?  No, continue to next question.  Has a county worker or another healthcare worker (not your employer) told you to come in to be tested for TB?  No, continue to next question.  Have you had a live vaccine (smallpox, flumist, MMR, varicella, oral polio and/or yellow fever) in the last 4 weeks?  No, lab visit scheduled.       Lab visit scheduled for 12/19/2023@10:40.    Debra Lacy

## 2023-12-19 ENCOUNTER — LAB (OUTPATIENT)
Dept: LAB | Facility: CLINIC | Age: 38
End: 2023-12-19
Payer: COMMERCIAL

## 2023-12-19 DIAGNOSIS — Z11.1 SCREENING EXAMINATION FOR PULMONARY TUBERCULOSIS: ICD-10-CM

## 2023-12-19 DIAGNOSIS — Z11.1 SCREENING EXAMINATION FOR PULMONARY TUBERCULOSIS: Primary | ICD-10-CM

## 2023-12-19 PROCEDURE — 36415 COLL VENOUS BLD VENIPUNCTURE: CPT

## 2023-12-19 PROCEDURE — 86481 TB AG RESPONSE T-CELL SUSP: CPT

## 2023-12-20 LAB
GAMMA INTERFERON BACKGROUND BLD IA-ACNC: 0.01 IU/ML
M TB IFN-G BLD-IMP: NEGATIVE
M TB IFN-G CD4+ BCKGRND COR BLD-ACNC: 9.99 IU/ML
MITOGEN IGNF BCKGRD COR BLD-ACNC: 0 IU/ML
MITOGEN IGNF BCKGRD COR BLD-ACNC: 0.01 IU/ML
QUANTIFERON MITOGEN: 10 IU/ML
QUANTIFERON NIL TUBE: 0.01 IU/ML
QUANTIFERON TB1 TUBE: 0.01 IU/ML
QUANTIFERON TB2 TUBE: 0.02

## 2024-02-06 ENCOUNTER — OFFICE VISIT (OUTPATIENT)
Dept: FAMILY MEDICINE | Facility: CLINIC | Age: 39
End: 2024-02-06
Payer: COMMERCIAL

## 2024-02-06 VITALS
HEIGHT: 62 IN | SYSTOLIC BLOOD PRESSURE: 138 MMHG | DIASTOLIC BLOOD PRESSURE: 92 MMHG | TEMPERATURE: 98.5 F | BODY MASS INDEX: 30.67 KG/M2 | HEART RATE: 78 BPM | RESPIRATION RATE: 18 BRPM | OXYGEN SATURATION: 99 %

## 2024-02-06 DIAGNOSIS — R51.9 ACUTE NONINTRACTABLE HEADACHE, UNSPECIFIED HEADACHE TYPE: ICD-10-CM

## 2024-02-06 DIAGNOSIS — R05.1 ACUTE COUGH: Primary | ICD-10-CM

## 2024-02-06 LAB
FLUAV AG SPEC QL IA: NEGATIVE
FLUAV RNA SPEC QL NAA+PROBE: NEGATIVE
FLUBV AG SPEC QL IA: NEGATIVE
FLUBV RNA RESP QL NAA+PROBE: NEGATIVE
RSV RNA SPEC NAA+PROBE: NEGATIVE
SARS-COV-2 RNA RESP QL NAA+PROBE: NEGATIVE

## 2024-02-06 PROCEDURE — 87637 SARSCOV2&INF A&B&RSV AMP PRB: CPT

## 2024-02-06 PROCEDURE — 99213 OFFICE O/P EST LOW 20 MIN: CPT | Mod: GC

## 2024-02-06 RX ORDER — ACETAMINOPHEN 500 MG
500-1000 TABLET ORAL 3 TIMES DAILY PRN
Qty: 90 TABLET | Refills: 3 | Status: SHIPPED | OUTPATIENT
Start: 2024-02-06

## 2024-02-06 ASSESSMENT — VISUAL ACUITY: OU: 1

## 2024-02-06 NOTE — PROGRESS NOTES
Assessment & Plan     Tonia is a 39 year old patient with no significant PMHx who presents for the following concerns:    Subacute cough; suspect viral URI  Headache acute  Acute on subacute cough. Cough was initially dry and associated with rhinorrhea 3 weeks ago, but has become more productive with headache in the last 24 hours. Also associated with 1 day of post-tussive emesis and headache. Unremarkable physical exam with lungs b/l clear to auscultation. No fevers, chills, or sore throat. Given timing and quality of symptoms, most likely initial viral URI, post-viral dry cough, and now new viral pna (covid/flu?). Discussed non-medical options to alleviate cough such as tea & honey and warm-saline gargle. Also discussed return precautions, such as fever, unrelenting cough, worsening symptoms which would be concerning for a developing bacterial PNA.   - Tylenol 500-1000mg TID for headache  - Symptomatic Influenza A/B, RSV, & SARS-CoV2 PCR (COVID-19) Nose  - Influenza A/B antigen  - No major co-morbidities indicating paxlovid or tamiflu if positive so will have risk benefit discussion of treatment if positive       Orders Placed This Encounter   Procedures    Symptomatic Influenza A/B, RSV, & SARS-CoV2 PCR (COVID-19) Nose       Post Medication Reconciliation Status: medications reconciled and changed, per note/orders    Return if symptoms worsen or fail to improve.    Rupinder Vargas MS3     Resident/Fellow Attestation   I, Moira Jimenez MD, was present with the medical/DANNY student who participated in the service and in the documentation of the note.  I have verified the history and personally performed the physical exam and medical decision making.  I agree with the assessment and plan of care as documented in the note.      Moira Jimenez MD  PGY2      Subjective       2/6/2024     9:39 AM   Additional Questions   Roomed by Michael       This is a 39 year old patient, presenting for the following health concerns:    Cough (3  "weeks) and Headache  Tonia is a 39yoF with no significant PMHx who presents to the clinic for a 3-week hx of cough. Shares that she initially noticed a runny nose/congestion for 1-2 days, then a dry cough for 3 weeks. Then yesterday, cough went from dry to productive and started having a headache. Cough is intermittent, notably worse at night and better during the day. Cough syrup did not alleviate symptoms. Also reports chest pain and headache while coughing. No fever chills, sore throat, SOB, nausea, or hematemesis. No sick contacts, recent hx of travel, or hx of smoking. Shares that she has never had anything like this before.     Review of Systems   Constitutional, HEENT, cardiovascular, pulmonary, gi and gu systems are negative, except as otherwise noted.      Objective    BP (!) 138/92   Pulse 78   Temp 98.5  F (36.9  C) (Oral)   Resp 18   Ht 1.575 m (5' 2\")   SpO2 99%   BMI 30.67 kg/m    Wt Readings from Last 4 Encounters:   03/21/23 76.1 kg (167 lb 11.2 oz)   01/10/23 75 kg (165 lb 6.4 oz)   06/30/22 77.2 kg (170 lb 3.2 oz)   03/29/22 77.2 kg (170 lb 3.2 oz)       Physical Exam  Constitutional:       General: She is awake.      Appearance: Normal appearance.   HENT:      Head: Normocephalic.      Right Ear: Tympanic membrane normal.      Left Ear: Tympanic membrane normal.   Eyes:      General: Vision grossly intact.      Extraocular Movements: Extraocular movements intact.      Conjunctiva/sclera: Conjunctivae normal.   Cardiovascular:      Rate and Rhythm: Normal rate and regular rhythm.      Heart sounds: Normal heart sounds, S1 normal and S2 normal.   Pulmonary:      Effort: Pulmonary effort is normal. No tachypnea, accessory muscle usage or respiratory distress.      Breath sounds: Normal breath sounds and air entry. No wheezing.   Musculoskeletal:      Cervical back: Full passive range of motion without pain.   Neurological:      Mental Status: She is alert.       Results are ordered and " pending    Moira Jimenez MD on 2/6/2024 at 10:04 AM    ----- Service Performed and Documented by Resident or Fellow ------            .

## 2024-02-06 NOTE — PATIENT INSTRUCTIONS
Thank you for coming to [facility] today.  Here is the plan from today's visit  Symptomatic care   Tylenol 500-1000mg three times per day  Follow up for blood pressure    If lab testing was done today, results will be communicated via: telephone if abnormal, letter if normal  If your results are normal / do not require quick action, they will be mailed to you or sent through [employer]   Normal results or results that don't require quick action can take up to 7 days to be communicated.  Normal lab results are sent by mail by default if you are not signed up for [email or web address]  If your results need quick action we will call you with the results.   The phone number we will call with results is # [phone] (home) . If this is not the best number please call our clinic and change the number.  If you are having labs done on a different day, please call [phone] to schedule at [facility] or [phone] for other [facility] locations. Labs do not offer walk-in appointments.    Referrals  If any referrals were ordered today you should be getting a call in the next week.  If you don't get a call within a week please call one of the following numbers   For any referral within [facility]: call [phone]  If your referral is for mental health or substance use disorder treatment within [facility], you may also call [phone]  If your referral is to outside [facility], or if you have issues scheduling, please call the clinic number ([phone]) and ask for a care coordinator during business hours (8-5:00 M-F)  If your referral is for gender care (voice therapy, surgery), you may call the [facility] coordinator at [phone]    Medication Refills  If you need any refills please call your pharmacy and they will contact us.   If you need to  your refill at a new pharmacy, please contact the new pharmacy directly. The new pharmacy will help you get your  medications transferred faster.    Imaging  If a Mammogram was ordered for you at the [facility] call [phone] to schedule or change your appointment.  If you had an XRay/CT/Ultrasound/MRI ordered the number is [phone] to schedule or change your radiology appointment.   [facility] has limited ultrasound appointments available on Wednesdays, if you would like your ultrasound at [facility], please call [phone] to schedule.      Follow up appointments  If you have any concerns about today's visit or wish to schedule another appointment please call our office during normal business hours [phone] (8-5:00 M-F)  If you can no longer make a scheduled visit at [facility], please cancel via [email or web address] or call us to cancel.     Medical Concerns:  If you have urgent medical concerns please call [phone] at any time of the day.  If you have a medical emergency please call [phone]    Moira Webster MD

## 2024-02-08 ENCOUNTER — TELEPHONE (OUTPATIENT)
Dept: FAMILY MEDICINE | Facility: CLINIC | Age: 39
End: 2024-02-08
Payer: COMMERCIAL

## 2024-02-08 NOTE — TELEPHONE ENCOUNTER
Called patient using [employer] interpeter #[id number], left generic message for patient to call back    Melly Lanier RN

## 2024-02-08 NOTE — TELEPHONE ENCOUNTER
----- Message from Moira Jimenez MD sent at 2/6/2024  6:01 PM CST -----  Could you please call patient with an [employer]  and let her know her covid and flu tests are negative, and she should continue with our plan for symptomatic management with tylenol, honey, and rest?  Thank you!

## 2024-02-09 NOTE — TELEPHONE ENCOUNTER
2nd attempt to reach patient to relay normal results, using [employer] . Was able to reach patient, and spoke with her, she says she is feeling better.    Melly Lanier RN

## 2024-07-15 ENCOUNTER — OFFICE VISIT (OUTPATIENT)
Dept: FAMILY MEDICINE | Facility: CLINIC | Age: 39
End: 2024-07-15
Payer: COMMERCIAL

## 2024-07-15 VITALS
TEMPERATURE: 98.3 F | HEART RATE: 87 BPM | WEIGHT: 166.9 LBS | OXYGEN SATURATION: 99 % | BODY MASS INDEX: 30.71 KG/M2 | DIASTOLIC BLOOD PRESSURE: 85 MMHG | HEIGHT: 62 IN | SYSTOLIC BLOOD PRESSURE: 120 MMHG | RESPIRATION RATE: 16 BRPM

## 2024-07-15 DIAGNOSIS — M54.50 LUMBAR PAIN: Primary | ICD-10-CM

## 2024-07-15 PROCEDURE — 99213 OFFICE O/P EST LOW 20 MIN: CPT | Mod: GC

## 2024-07-15 RX ORDER — ACETAMINOPHEN 500 MG
500-1000 TABLET ORAL EVERY 6 HOURS PRN
Qty: 90 TABLET | Refills: 1 | Status: SHIPPED | OUTPATIENT
Start: 2024-07-15

## 2024-07-15 ASSESSMENT — PATIENT HEALTH QUESTIONNAIRE - PHQ9: SUM OF ALL RESPONSES TO PHQ QUESTIONS 1-9: 0

## 2024-07-15 NOTE — PROGRESS NOTES
Assessment & Plan     Lumbar pain with sciatica  Recurrence/exacerbation of low back pain with sciatica consistent with previous back pain. Reviewed MRI completed in 2018 which showed spinal stenosis, disc extrusion at L5-S1, nerve impingement at S1. No red flag symptoms. Exam reveals TTP along lumbar spine and bilateral lumbar paraspinal muscles. Strength limited by pain. PT was successful in resolving pain in past, patient would like to try PT again, referral sent and handout given for exercises to do at home. Has seen sports med last visit 2021, was doing well with conservative management. If pain persists or worsens, would recommend repeat MRI. 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  - Physical Therapy  Referral; Future  - acetaminophen (TYLENOL) 500 MG tablet; Take 1-2 tablets (500-1,000 mg) by mouth every 6 hours as needed for mild pain  - diclofenac (VOLTAREN) 1 % topical gel; Apply 4 g topically 4 times daily        Return in about 4 weeks (around 8/12/2024), or if symptoms worsen or fail to improve.    Subjective   Tonia is a 39 year old, presenting for the following health issues:  Pain (Middle of back ) and Medication Refill (Muscle relaxer )    HPI     Has tried PT which is helpful and the back pain went away  However, back pain restarted at end of June and has been worsening this past month  No triggering activity, just awoke with it  Feels like the same pain as the one in 2018  Radiates down the leg and worsens when does things like when she is trying to put on her pants or with other picking up activities  Some gentle exercises make it better, hasn't tried any medications  No fevers, no unexpected weight loss, no cancer hx, no weakness or numbness in the legs, no problems with going to the bathroom  Pain gets worse as the day goes on           Objective    /85   Pulse 87   Temp 98.3  F (36.8  C) (Temporal)   Resp 16   " Ht 1.575 m (5' 2\")   Wt 75.7 kg (166 lb 14.4 oz)   SpO2 99%   BMI 30.53 kg/m    Body mass index is 30.53 kg/m .  Physical Exam   GENERAL: alert and no distress  EYES: Eyes grossly normal to inspection, PERRL and conjunctivae and sclerae normal  HENT: ear canals and TM's normal, nose and mouth without ulcers or lesions  NECK: no adenopathy, no asymmetry, masses, or scars  RESP: lungs clear to auscultation - no rales, rhonchi or wheezes  CV: regular rate and rhythm, normal S1 S2, no S3 or S4, no murmur, click or rub, no peripheral edema  ABDOMEN: soft, nontender, no hepatosplenomegaly, no masses and bowel sounds normal  MS: no gross musculoskeletal defects noted, no edema  ORTHO: Lumber/Thoracic Spine Exam: Tender:  lumbar facet joints, left para lumbar muscles, right para lumbar muscles  Range of Motion:  lumbar flexion  decreased, painful, lumbar extension  decreased, painful  Strength:  unable to heel or toe walk due to pain. Strength testing induced pain, no focal abnormalities  Special tests:  Unable to lie down to perform straight leg test due to pain.    Hip Exam: Hip ROM full  SKIN: no suspicious lesions or rashes  NEURO: Normal strength and tone, sensory exam grossly normal, mentation intact, and DTR's normal and symmetric patellar  PSYCH: mentation appears normal, affect normal/bright          Signed Electronically by: Sydni Aiken MD    "

## 2024-07-15 NOTE — LETTER
[facility]  [address] [place]  [address]  [facility] [address]  Phone: [phone]  Fax: [phone]    July 15, 2024        Tonia MALDONADO  [facility] [id number]          To whom it may concern:    RE: Tonia Smith    Patient was seen and treated today at our clinic. She should remain home from work for the next 3-4 weeks while she recovers. She will be undergoing treatment during this time and may return to work sooner if she feels improved. Please contact me for questions or concerns.      Sincerely,        Sydni Aiken

## 2024-07-15 NOTE — PATIENT INSTRUCTIONS
Patient Education   Here is the plan from today's visit      2. Lumbar pain  - Physical Therapy  Referral; Future  -Take tylenol as needed and use voltaren gel for pain.      Please call or return to clinic if your symptoms don't go away.    Follow up plan  Return in about 4 weeks (around 8/12/2024), or if symptoms worsen or fail to improve.    Thank you for coming to [facility] today.  Lab Testing:  **If you had lab testing today and your results are reassuring or normal they will be mailed to you or sent through [employer] within 7 days.   **If the lab tests need quick action we will call you with the results.  **If you are having labs done on a different day, please call [phone] to schedule at [facility] or [phone] for other [facility] locations. Labs do not offer walk-in appointments.  The phone number we will call with results is # [phone] (home) . If this is not the best number please call our clinic and change the number.  Medication Refills:  If you need any refills please call your pharmacy and they will contact us.   If you need to  your refill at a new pharmacy, please contact the new pharmacy directly. The new pharmacy will help you get your medications transferred faster.   Scheduling:  If you have any concerns about today's visit or wish to schedule another appointment please call our office during normal business hours [phone] (8-5:00 M-F). If you can no longer make a scheduled visit, please cancel via [employer] or call us to cancel.   If a referral was made to an [facility] specialty provider and you do not get a call from central scheduling, please refer to directions on your visit summary or call our office during normal business hours for assistance.   If a Mammogram was ordered for you at the [facility] call [phone] to schedule or change your appointment.  If you had an XRay/CT/Ultrasound/MRI ordered the number is  [phone] to schedule or change your radiology appointment.   [facility] has limited ultrasound appointments available on Wednesdays, if you would like your ultrasound at [facility], please call [phone] to schedule.   Medical Concerns:  If you have urgent medical concerns please call [phone] at any time of the day.    Sydni Aiken MD

## 2024-07-17 ENCOUNTER — THERAPY VISIT (OUTPATIENT)
Dept: PHYSICAL THERAPY | Facility: CLINIC | Age: 39
End: 2024-07-17
Payer: COMMERCIAL

## 2024-07-17 DIAGNOSIS — M54.50 LUMBAR PAIN: ICD-10-CM

## 2024-07-17 PROCEDURE — 97110 THERAPEUTIC EXERCISES: CPT | Mod: GP | Performed by: PHYSICAL THERAPIST

## 2024-07-17 PROCEDURE — 97161 PT EVAL LOW COMPLEX 20 MIN: CPT | Mod: GP | Performed by: PHYSICAL THERAPIST

## 2024-07-17 PROCEDURE — 97140 MANUAL THERAPY 1/> REGIONS: CPT | Mod: GP | Performed by: PHYSICAL THERAPIST

## 2024-07-17 NOTE — PROGRESS NOTES
PHYSICAL THERAPY EVALUATION  Type of Visit: Evaluation              Subjective       Presenting condition or subjective complaint:    Pt notes severe LBP, ongoing since July 4. No specific injury or incident causing onset.   Radicular symptoms reported in LLE to calf.   Report prior PT has been helpful.   Date of onset: 07/04/24    Relevant medical history:     Dates & types of surgery:      Prior diagnostic imaging/testing results:       Prior therapy history for the same diagnosis, illness or injury:        Prior Level of Function  Transfers:   Ambulation:   ADL:   IADL:     Living Environment  Social support:     Type of home:     Stairs to enter the home:         Ramp:     Stairs inside the home:         Help at home:    Equipment owned:       Employment:      Hobbies/Interests:      Patient goals for therapy:      Pain assessment: See objective evaluation for additional pain details     Objective   LUMBAR SPINE EVALUATION  PAIN: Pain Location: lumbar spine and and LLE   INTEGUMENTARY (edema, incisions):   POSTURE:   GAIT:   Weightbearing Status:   Assistive Device(s): None  Gait Deviations: Antalgic  Slow gait speed  BALANCE/PROPRIOCEPTION:   WEIGHTBEARING ALIGNMENT:   NON-WEIGHTBEARING ALIGNMENT:    ROM:   Flexion to mid thigh  +LE symptoms  Extension mod limitation    PELVIC/SI SCREEN:   STRENGTH:     MYOTOMES: WNL, pain limited L knee extension and hip flexion  DTR S:   CORD SIGNS:   DERMATOMES: WNL  NEURAL TENSION: + slump L   FLEXIBILITY:   LUMBAR/HIP Special Tests:    PELVIS/SI SPECIAL TESTS:   FUNCTIONAL TESTS:   PALPATION: ttp paraspinals  SPINAL SEGMENTAL CONCLUSIONS: hypomobile PA mobs through lower lumbar spine      Assessment & Plan   CLINICAL IMPRESSIONS  Medical Diagnosis: Lumbar pain (M54.50)    Treatment Diagnosis: Low back pain   Impression/Assessment: Patient is a 39 year old female with acute low back pain, with left radicular symptoms complaints.  The following significant findings have been  identified: Pain, Decreased ROM/flexibility, Impaired gait, Impaired muscle performance, and Decreased activity tolerance. These impairments interfere with their ability to perform self care tasks, work tasks, recreational activities, household chores, driving , household mobility, and community mobility as compared to previous level of function.     Clinical Decision Making (Complexity):  Clinical Presentation: Stable/Uncomplicated  Clinical Presentation Rationale: based on medical and personal factors listed in PT evaluation  Clinical Decision Making (Complexity): Low complexity    PLAN OF CARE  Treatment Interventions:  Interventions: Manual Therapy, Neuromuscular Re-education, Therapeutic Activity, Therapeutic Exercise, Self-Care/Home Management    Long Term Goals     PT Goal 1  Goal Identifier: 1  Goal Description: Pt will be able to sit 20 minutes without increased symptoms  Rationale: to maximize safety and independence with performance of ADLs and functional tasks;to maximize safety and independence within the community;to maximize safety and independence within the home;to maximize safety and independence with transportation  Target Date: 09/11/24  PT Goal 2  Goal Identifier: 2  Goal Description: Pt will be able to stand from sitting without increased symptoms  Rationale: to maximize safety and independence within the home;to maximize safety and independence within the community;to maximize safety and independence with self cares;to maximize safety and independence with performance of ADLs and functional tasks  Target Date: 09/11/24      Frequency of Treatment: 1x/week  Duration of Treatment: 8 weeeks    Recommended Referrals to Other Professionals:   Education Assessment:        Risks and benefits of evaluation/treatment have been explained.   Patient/Family/caregiver agrees with Plan of Care.     Evaluation Time:     PT Eval, Low Complexity Minutes (56633): 14   Present: Yes: Language: Oromo,  ID Number/Identifier: [id number]      Signing Clinician: Toshia Vo PT        [facility]                                                                                   OUTPATIENT PHYSICAL THERAPY      PLAN OF TREATMENT FOR OUTPATIENT REHABILITATION   Patient's Last Name, First Name, Tonia Wilkinson    YOB: 1985   Provider's Name   [facility]   Medical Record No.  [id number]     Onset Date: 07/04/24  Start of Care Date: 07/17/24     Medical Diagnosis:  Lumbar pain (M54.50)      PT Treatment Diagnosis:  Low back pain Plan of Treatment  Frequency/Duration: 1x/week/ 8 weeeks    Certification date from 07/17/24 to           See note for plan of treatment details and functional goals     Toshia Vo, PT                         I CERTIFY THE NEED FOR THESE SERVICES FURNISHED UNDER        THIS PLAN OF TREATMENT AND WHILE UNDER MY CARE     (Physician attestation of this document indicates review and certification of the therapy plan).              Referring Provider:  Sydni Aiken    Initial Assessment  See Epic Evaluation- Start of Care Date: 07/17/24

## 2024-07-23 ENCOUNTER — THERAPY VISIT (OUTPATIENT)
Dept: PHYSICAL THERAPY | Facility: CLINIC | Age: 39
End: 2024-07-23
Payer: COMMERCIAL

## 2024-07-23 DIAGNOSIS — M54.50 LUMBAR PAIN: Primary | ICD-10-CM

## 2024-07-23 PROCEDURE — 97140 MANUAL THERAPY 1/> REGIONS: CPT | Mod: GP

## 2024-07-23 PROCEDURE — T1013 SIGN LANG/ORAL INTERPRETER: HCPCS | Mod: U4

## 2024-07-23 PROCEDURE — 97110 THERAPEUTIC EXERCISES: CPT | Mod: GP

## 2024-09-26 NOTE — PROGRESS NOTES
07/23/24 0500   Appointment Info   Signing clinician's name / credentials Chen Shaffer, PT, DPT   Total/Authorized Visits 8   Visits Used 2   Medical Diagnosis Lumbar pain (M54.50)   PT Tx Diagnosis Low back pain   Other pertinent information Ipad  - request [place] dialect of [place]   Quick Adds Certification   Progress Note/Certification   Start of Care Date 07/17/24   Onset of illness/injury or Date of Surgery 07/04/24   Therapy Frequency 1x/week   Predicted Duration 8 weeeks   Certification date from 07/17/24   Certification date to 09/11/24   GOALS   PT Goals 2   PT Goal 1   Goal Identifier 1   Goal Description Pt will be able to sit 20 minutes without increased symptoms   Rationale to maximize safety and independence with performance of ADLs and functional tasks;to maximize safety and independence within the community;to maximize safety and independence within the home;to maximize safety and independence with transportation   Target Date 09/11/24   PT Goal 2   Goal Identifier 2   Goal Description Pt will be able to stand from sitting without increased symptoms   Rationale to maximize safety and independence within the home;to maximize safety and independence within the community;to maximize safety and independence with self cares;to maximize safety and independence with performance of ADLs and functional tasks   Target Date 09/11/24   Subjective Report   Subjective Report Back pain is continuing down back of left leg. Continuing over night.   Treatment Interventions (PT)   Interventions Therapeutic Procedure/Exercise;Manual Therapy   Therapeutic Procedure/Exercise   Therapeutic Procedures: strength, endurance, ROM, flexibility minutes (68147) 23   Ther Proc 1 Edu   Ther Proc 1 - Details Review anatomy, symptoms, management, recommendations, goals with exercises   PTRx Ther Proc 1 Upper Trapezius Stretch   PTRx Ther Proc 1 - Details x 30 sec B   PTRx Ther Proc 2 All Fours Thoracic Rotation   PTRx  Ther Proc 2 - Details x 10 B at EOB   PTRx Ther Proc 3 Prone Positioning   PTRx Ther Proc 3 - Details prone lying before and after MT to improve stretch.   PTRx Ther Proc 4 Prone On Elbows   PTRx Ther Proc 4 - Details 3x30 sec holds, x 10 - 3 sec holds with CPA at L1 - 3   Skilled Intervention initiate hEP, improve mobility, decrease pain and radicular symptoms   Patient Response/Progress improved gait mechancis s/p intervention   PTRx Ther Proc 5 Prone Press Ups   PTRx Ther Proc 5 - Details x 5   PTRx Ther Proc 6 Standing Extension   PTRx Ther Proc 6 - Details x 5   Manual Therapy   Manual Therapy: Mobilization, MFR, MLD, friction massage minutes (49018) 15   Manual Therapy 1 Prone PA mob grade II-III through lumbar spinee, focus UPA L2-L5   Manual Therapy 1 - Details good tolerance   Skilled Intervention improve mobility, deecrease pain   Patient Response/Progress reporting less symptoms   Manual Therapy 2 Prone STM   Manual Therapy 2 - Details B (L>R) lumbar PS, QL   Manual Therapy Manual Therapy 2   Plan   Home program printed ptrx   Plan for next session improve ROM, pain, reduce radicular symptoms   Total Session Time   Timed Code Treatment Minutes 38   Total Treatment Time (sum of timed and untimed services) 38         DISCHARGE  Reason for Discharge: Patient has failed to schedule further appointments.    Equipment Issued: NA    Discharge Plan: Patient to continue home program.    Referring Provider:  Sydni Aiken

## 2024-11-11 ENCOUNTER — HOSPITAL ENCOUNTER (EMERGENCY)
Facility: HOSPITAL | Age: 39
Discharge: HOME | End: 2024-11-11
Admitting: PHYSICIAN ASSISTANT
Payer: COMMERCIAL

## 2024-11-11 ENCOUNTER — TELEPHONE (OUTPATIENT)
Dept: FAMILY MEDICINE | Facility: CLINIC | Age: 39
End: 2024-11-11

## 2024-11-11 VITALS
OXYGEN SATURATION: 100 % | SYSTOLIC BLOOD PRESSURE: 161 MMHG | HEART RATE: 77 BPM | BODY MASS INDEX: 30.73 KG/M2 | RESPIRATION RATE: 18 BRPM | TEMPERATURE: 98.2 F | WEIGHT: 168 LBS | DIASTOLIC BLOOD PRESSURE: 92 MMHG

## 2024-11-11 DIAGNOSIS — R03.0 ELEVATED BLOOD PRESSURE READING WITHOUT DIAGNOSIS OF HYPERTENSION: ICD-10-CM

## 2024-11-11 DIAGNOSIS — G44.209 TENSION HEADACHE: ICD-10-CM

## 2024-11-11 PROCEDURE — 99284 EMERGENCY DEPT VISIT MOD MDM: CPT

## 2024-11-11 PROCEDURE — 250N000013 HC RX MED GY IP 250 OP 250 PS 637: Performed by: PHYSICIAN ASSISTANT

## 2024-11-11 PROCEDURE — 250N000011 HC RX IP 250 OP 636: Performed by: PHYSICIAN ASSISTANT

## 2024-11-11 PROCEDURE — 96372 THER/PROPH/DIAG INJ SC/IM: CPT | Performed by: PHYSICIAN ASSISTANT

## 2024-11-11 RX ORDER — METOCLOPRAMIDE 10 MG/1
10 TABLET ORAL
Qty: 20 TABLET | Refills: 0 | Status: SHIPPED | OUTPATIENT
Start: 2024-11-11

## 2024-11-11 RX ORDER — KETOROLAC TROMETHAMINE 15 MG/ML
15 INJECTION, SOLUTION INTRAMUSCULAR; INTRAVENOUS ONCE
Status: COMPLETED | OUTPATIENT
Start: 2024-11-11 | End: 2024-11-11

## 2024-11-11 RX ORDER — METOCLOPRAMIDE 10 MG/1
10 TABLET ORAL ONCE
Status: COMPLETED | OUTPATIENT
Start: 2024-11-11 | End: 2024-11-11

## 2024-11-11 RX ORDER — DIPHENHYDRAMINE HCL 25 MG
25 CAPSULE ORAL ONCE
Status: COMPLETED | OUTPATIENT
Start: 2024-11-11 | End: 2024-11-11

## 2024-11-11 RX ADMIN — METOCLOPRAMIDE 10 MG: 10 TABLET ORAL at 15:14

## 2024-11-11 RX ADMIN — DIPHENHYDRAMINE HYDROCHLORIDE 25 MG: 25 CAPSULE ORAL at 15:14

## 2024-11-11 RX ADMIN — KETOROLAC TROMETHAMINE 15 MG: 15 INJECTION, SOLUTION INTRAMUSCULAR; INTRAVENOUS at 15:14

## 2024-11-11 ASSESSMENT — COLUMBIA-SUICIDE SEVERITY RATING SCALE - C-SSRS
1. IN THE PAST MONTH, HAVE YOU WISHED YOU WERE DEAD OR WISHED YOU COULD GO TO SLEEP AND NOT WAKE UP?: NO
6. HAVE YOU EVER DONE ANYTHING, STARTED TO DO ANYTHING, OR PREPARED TO DO ANYTHING TO END YOUR LIFE?: NO
2. HAVE YOU ACTUALLY HAD ANY THOUGHTS OF KILLING YOURSELF IN THE PAST MONTH?: NO

## 2024-11-11 ASSESSMENT — ACTIVITIES OF DAILY LIVING (ADL): ADLS_ACUITY_SCORE: 0

## 2024-11-11 NOTE — ED TRIAGE NOTES
Patient reports headache since 11/07.  C/o dizziness with this.  Last had ibuprofen on Saturday.  Denies any N/V.

## 2024-11-11 NOTE — Clinical Note
Tonia Smith was seen and treated in our emergency department on 11/11/2024.  She may return to work on 11/12/2024.       If you have any questions or concerns, please don't hesitate to call.      Antonio Fontenot PA-C

## 2024-11-11 NOTE — DISCHARGE INSTRUCTIONS
You were seen here in the emergency department for evaluation of headache.  Your examination here is reassuring.  Follow-up with your primary care doctor regarding your elevated blood pressures.  I would not start you on medications at this point.  Dose recommendations for ibuprofen and Tylenol clued below.  Additionally, I have sent some Reglan to your pharmacy, you can use this in addition to ibuprofen to help with headaches.    For pain or fever you may use:  -Tylenol 650 mg every 6 hours.  Max 4000 mg in 24 hours  Do not use thismedication with alcohol as it can cause liver problems.  -Ibuprofen 600 mg every 6 hours.  Max 3500 mg in 24 hours  Do not take this medication if you have a history of a GI bleed or have kidney problems.  You may use both of these medications at the same time or you can alternate them every 3 hours.  For example, Tylenol at 6 AM, ibuprofen at 9 AM, Tylenol at noon, etc.

## 2024-11-12 ENCOUNTER — VIRTUAL VISIT (OUTPATIENT)
Dept: INTERPRETER SERVICES | Facility: CLINIC | Age: 39
End: 2024-11-12

## 2024-11-12 ENCOUNTER — OFFICE VISIT (OUTPATIENT)
Dept: FAMILY MEDICINE | Facility: CLINIC | Age: 39
End: 2024-11-12
Payer: COMMERCIAL

## 2024-11-12 VITALS
BODY MASS INDEX: 30.73 KG/M2 | WEIGHT: 167 LBS | SYSTOLIC BLOOD PRESSURE: 134 MMHG | OXYGEN SATURATION: 98 % | HEIGHT: 62 IN | DIASTOLIC BLOOD PRESSURE: 84 MMHG | HEART RATE: 77 BPM | TEMPERATURE: 97.7 F

## 2024-11-12 DIAGNOSIS — G44.209 TENSION HEADACHE: Primary | ICD-10-CM

## 2024-11-12 DIAGNOSIS — J30.2 SEASONAL ALLERGIC RHINITIS, UNSPECIFIED TRIGGER: ICD-10-CM

## 2024-11-12 PROCEDURE — 99213 OFFICE O/P EST LOW 20 MIN: CPT | Performed by: FAMILY MEDICINE

## 2024-11-12 RX ORDER — INDOMETHACIN 50 MG/1
50 CAPSULE ORAL 2 TIMES DAILY WITH MEALS
Qty: 30 CAPSULE | Refills: 1 | Status: SHIPPED | OUTPATIENT
Start: 2024-11-12

## 2024-11-12 RX ORDER — TIZANIDINE 2 MG/1
2-4 TABLET ORAL 3 TIMES DAILY PRN
Qty: 60 TABLET | Refills: 0 | Status: SHIPPED | OUTPATIENT
Start: 2024-11-12 | End: 2024-11-22

## 2024-11-12 RX ORDER — FLUTICASONE PROPIONATE 50 MCG
1-2 SPRAY, SUSPENSION (ML) NASAL DAILY
Qty: 18.2 ML | Refills: 4 | Status: SHIPPED | OUTPATIENT
Start: 2024-11-12

## 2024-11-12 NOTE — LETTER
2024    Tonia Smith   1985        To Whom it May Concern;    Please excuse Tonia Smith from  24 and  for a healthcare visit on 2024. She may return to work     Sincerely,        Luma Nunez MD

## 2024-11-12 NOTE — PATIENT INSTRUCTIONS
Try taking the muscle relaxer ( tizanidine ) for the headache   You can try the indomethacin for the headache just make sure that you have eaten something  Physical therapy also

## 2024-11-12 NOTE — TELEPHONE ENCOUNTER
Please contact [facility] and have them follow up with them. She appears to be hearing impaired and follow up with pcp is [facility] [id number]

## 2024-11-12 NOTE — PROGRESS NOTES
"  {PROVIDER CHARTING PREFERENCE:319332}    Subjective   Tonia is a 39 year old, presenting for the following health issues:  ER F/U        11/12/2024    11:31 AM   Additional Questions   Roomed by Akosua NAYLOR CMA     [facility]       ED/UC Followup:  Facility:  [facility]  Date of visit: 11/11/24  Reason for visit: Tension Headache, elevated blood pressure without diagnosis.   Current Status: still has headache    BP Readings from Last 6 Encounters:   11/12/24 134/84   11/11/24 (!) 161/92   07/15/24 120/85   02/06/24 (!) 138/92   03/21/23 (!) 135/93   01/10/23 131/86     She has good blood pressure today she was I the emergency room       {ROS Picklists (Optional):432881}      Objective    /84 (BP Location: Right arm, Patient Position: Sitting, Cuff Size: Adult Regular)   Pulse 77   Temp 97.7  F (36.5  C) (Tympanic)   Ht 1.575 m (5' 2\")   Wt 75.8 kg (167 lb)   LMP 10/29/2024   SpO2 98%   BMI 30.54 kg/m    Body mass index is 30.54 kg/m .  Physical Exam   {Exam List (Optional):513240}    {Diagnostic Test Results (Optional):373265}        Signed Electronically by: Luma Nunez MD  {Email feedback regarding this note to [email or web address]   :516344}  " "Systems  Constitutional, HEENT, cardiovascular, pulmonary, gi and gu systems are negative, except as otherwise noted.      Objective    /84 (BP Location: Right arm, Patient Position: Sitting, Cuff Size: Adult Regular)   Pulse 77   Temp 97.7  F (36.5  C) (Tympanic)   Ht 1.575 m (5' 2\")   Wt 75.8 kg (167 lb)   LMP 10/29/2024   SpO2 98%   BMI 30.54 kg/m    Body mass index is 30.54 kg/m .  Physical Exam   GENERAL: alert and no distress  EYES: Eyes grossly normal to inspection, PERRL and conjunctivae and sclerae normal  HENT: ear canals and TM's normal, nose and mouth without ulcers or lesions  NECK: no adenopathy, no asymmetry, masses, or scars tense traps bilaterally with tender muscle insertions at the back of the head.  NEURO: Normal strength and tone, mentation intact and speech normal  PSYCH: mentation appears normal, affect normal/bright            Signed Electronically by: Luma Nunez MD    "

## 2025-01-28 ENCOUNTER — OFFICE VISIT (OUTPATIENT)
Dept: URGENT CARE | Facility: URGENT CARE | Age: 40
End: 2025-01-28
Payer: COMMERCIAL

## 2025-01-28 VITALS
OXYGEN SATURATION: 98 % | SYSTOLIC BLOOD PRESSURE: 130 MMHG | BODY MASS INDEX: 30.18 KG/M2 | RESPIRATION RATE: 20 BRPM | DIASTOLIC BLOOD PRESSURE: 78 MMHG | WEIGHT: 165 LBS | HEART RATE: 78 BPM | TEMPERATURE: 98 F

## 2025-01-28 DIAGNOSIS — J06.9 VIRAL UPPER RESPIRATORY TRACT INFECTION WITH COUGH: Primary | ICD-10-CM

## 2025-01-28 PROCEDURE — 99214 OFFICE O/P EST MOD 30 MIN: CPT | Performed by: PHYSICIAN ASSISTANT

## 2025-01-28 RX ORDER — BENZONATATE 100 MG/1
100 CAPSULE ORAL 3 TIMES DAILY PRN
Qty: 30 CAPSULE | Refills: 0 | Status: SHIPPED | OUTPATIENT
Start: 2025-01-28 | End: 2025-02-04

## 2025-01-28 NOTE — PROGRESS NOTES
URGENT CARE VISIT:    SUBJECTIVE:   Tonia Smith is a 40 year old female presenting with a chief complaint of stuffy nose and cough - productive.  Onset was 1 week(s) ago.   She denies the following symptoms: shortness of breath and sore throat  Course of illness is same.    Treatment measures tried include None tried with no relief of symptoms.  Predisposing factors include ill contact: Family member    [place]o phone  was used.     PMH:   Past Medical History:   Diagnosis Date    Hypertension     Preeclampsia      Allergies: Patient has no known allergies.   Medications:   Current Outpatient Medications   Medication Sig Dispense Refill    benzonatate (TESSALON) 100 MG capsule Take 1 capsule (100 mg) by mouth 3 times daily as needed for cough. 30 capsule 0    acetaminophen (TYLENOL) 500 MG tablet Take 1-2 tablets (500-1,000 mg) by mouth every 6 hours as needed for mild pain (Patient not taking: Reported on 1/28/2025) 90 tablet 1    diclofenac (VOLTAREN) 1 % topical gel Apply 4 g topically 4 times daily (Patient not taking: Reported on 1/28/2025) 350 g 1    fluticasone (FLONASE) 50 MCG/ACT nasal spray Spray 1-2 sprays into both nostrils daily. (Patient not taking: Reported on 1/28/2025) 18.2 mL 4    indomethacin (INDOCIN) 50 MG capsule Take 1 capsule (50 mg) by mouth 2 times daily (with meals). (Patient not taking: Reported on 1/28/2025) 30 capsule 1    metoclopramide (REGLAN) 10 MG tablet Take 1 tablet (10 mg) by mouth 4 times daily (before meals and nightly). (Patient not taking: Reported on 1/28/2025) 20 tablet 0    Prenatal Vit-Fe Fumarate-FA (PRENATAL MULTIVITAMIN W/IRON) 27-0.8 MG tablet Take 1 tablet by mouth daily (Patient not taking: Reported on 1/28/2025) 90 tablet 3    Sodium Chloride-Sodium Bicarb (AYR SALINE NASAL NETI RINSE) 1.57 g PACK Rinse out nose when needed (Patient not taking: Reported on 1/28/2025) 40 each 4     Social History:   Social History     Tobacco Use    Smoking status: Never      Passive exposure: Never    Smokeless tobacco: Never   Substance Use Topics    Alcohol use: No       ROS:  General: negative  Skin: negative  Eyes: negative  Ears/Nose/Throat: nasal congestion  Respiratory: Cough  Cardiovascular: negative  Gastrointestinal: negative  Genitourinary: negative  Musculoskeletal: negative  Neurologic: negative      OBJECTIVE:  /78   Pulse 78   Temp 98  F (36.7  C)   Resp 20   Wt 74.8 kg (165 lb)   SpO2 98%   BMI 30.18 kg/m    GENERAL APPEARANCE: healthy, alert and no distress  EYES: EOMI,  PERRL, conjunctiva clear  HENT: ear canals and TM's normal.  Nose and mouth without ulcers, erythema or lesions  NECK: supple, nontender, shotty  lymphadenopathy  RESP: lungs clear to auscultation - no rales, rhonchi or wheezes  CV: regular rates and rhythm, normal S1 S2, no murmur noted  SKIN: no suspicious lesions or rashes      ASSESSMENT:    ICD-10-CM    1. Viral upper respiratory tract infection with cough  J06.9 benzonatate (TESSALON) 100 MG capsule          PLAN:  Patient Instructions   Patient was educated on the natural course of viral illness which typically lasts up to 10 days. Take medication as directed. Side effects discussed. Conservative measures discussed including increased fluids, nasal saline irrigation (neti pot), warm steamy shower, salt water gargles, expectorants (Mucinex), and analgesics (Tylenol and/or Ibuprofen). See your primary care provider if symptoms worsen or do not improve in 7 days. Seek emergency care if you develop fever over 104 or shortness of breath. Patient verbalized understanding and is agreeable to plan. The patient was discharged ambulatory and in stable condition.    Shannon Krueger PA-C ....................  1/28/2025   10:38 AM

## 2025-01-28 NOTE — PATIENT INSTRUCTIONS
Patient was educated on the natural course of viral illness which typically lasts up to 10 days. Take medication as directed. Side effects discussed. Conservative measures discussed including increased fluids, nasal saline irrigation (neti pot), warm steamy shower, salt water gargles, expectorants (Mucinex), and analgesics (Tylenol and/or Ibuprofen). See your primary care provider if symptoms worsen or do not improve in 7 days. Seek emergency care if you develop fever over 104 or shortness of breath.

## 2025-01-28 NOTE — LETTER
January 28, 2025      Tonia Smith  [address] [place] [id number]        To Whom It May Concern:    Tonia Smith  was seen in clinic today. Please excuse from work yesterday and today.         Sincerely,        Shannon Krueger PA-C    Electronically signed